# Patient Record
Sex: MALE | Race: WHITE | Employment: OTHER | URBAN - METROPOLITAN AREA
[De-identification: names, ages, dates, MRNs, and addresses within clinical notes are randomized per-mention and may not be internally consistent; named-entity substitution may affect disease eponyms.]

---

## 2017-01-09 ENCOUNTER — TRANSFERRED RECORDS (OUTPATIENT)
Dept: HEALTH INFORMATION MANAGEMENT | Facility: CLINIC | Age: 72
End: 2017-01-09

## 2017-01-09 LAB
ALT SERPL-CCNC: 31 U/L (ref 12–78)
AST SERPL-CCNC: 22 U/L (ref 0–40)
CREAT SERPL-MCNC: 0.96 MG/DL (ref 0.66–1.25)
GFR SERPL CREATININE-BSD FRML MDRD: >60 ML/MIN/1.73M2
GLUCOSE SERPL-MCNC: 115 MG/DL (ref 70–180)
POTASSIUM SERPL-SCNC: 5.2 MMOL/L (ref 3.5–5.3)

## 2017-01-20 ENCOUNTER — OFFICE VISIT (OUTPATIENT)
Dept: FAMILY MEDICINE | Facility: CLINIC | Age: 72
End: 2017-01-20
Payer: COMMERCIAL

## 2017-01-20 ENCOUNTER — TELEPHONE (OUTPATIENT)
Dept: OTHER | Facility: CLINIC | Age: 72
End: 2017-01-20

## 2017-01-20 VITALS
HEART RATE: 64 BPM | DIASTOLIC BLOOD PRESSURE: 62 MMHG | SYSTOLIC BLOOD PRESSURE: 132 MMHG | OXYGEN SATURATION: 93 % | WEIGHT: 134.1 LBS

## 2017-01-20 DIAGNOSIS — I73.9 PVD (PERIPHERAL VASCULAR DISEASE) (H): Primary | ICD-10-CM

## 2017-01-20 DIAGNOSIS — J44.9 CHRONIC OBSTRUCTIVE PULMONARY DISEASE, UNSPECIFIED COPD TYPE (H): ICD-10-CM

## 2017-01-20 DIAGNOSIS — F03.90 DEMENTIA WITHOUT BEHAVIORAL DISTURBANCE, UNSPECIFIED DEMENTIA TYPE: ICD-10-CM

## 2017-01-20 DIAGNOSIS — I70.90 ARTERIOSCLEROTIC VASCULAR DISEASE: ICD-10-CM

## 2017-01-20 DIAGNOSIS — I73.9 CLAUDICATION (H): ICD-10-CM

## 2017-01-20 PROCEDURE — 99204 OFFICE O/P NEW MOD 45 MIN: CPT | Performed by: FAMILY MEDICINE

## 2017-01-20 RX ORDER — ALBUTEROL SULFATE 0.83 MG/ML
1 SOLUTION RESPIRATORY (INHALATION) EVERY 6 HOURS PRN
Qty: 25 VIAL | Refills: 1 | COMMUNITY
Start: 2017-01-20 | End: 2017-09-06 | Stop reason: ALTCHOICE

## 2017-01-20 RX ORDER — ATENOLOL 50 MG/1
50 TABLET ORAL DAILY
COMMUNITY
End: 2017-03-17

## 2017-01-20 RX ORDER — CLOPIDOGREL BISULFATE 75 MG/1
75 TABLET ORAL DAILY
Qty: 90 TABLET | Refills: 3 | Status: SHIPPED | OUTPATIENT
Start: 2017-01-20 | End: 2017-03-08

## 2017-01-20 NOTE — PROGRESS NOTES
"Chief Complaint   Patient presents with     [facility] Care     get aquainted with Dr. Hills      Patient Request     concerns with ? stopping the O2      Results     go over lab resuts that were done at [facility]      ADDITIONAL HPI: 71 year old male here for above issue.  12/22/2016 was hospitalized with pneumonia. Initially was felt to be CV and had angiogram that was \"normal\".  Has a history of COPD.  Breathing has been stable since discharge. Currently still on home O2. Wondering if he can stop this.  He also has a history of dementia.    On 12/5/2016 he underwent balloon plasty and stent of left external iliac artery for PVD with claudications. left superficial femoral artery was occluded.initiallt through mid to distal thigh and this underwent arthrectomy followed by a drug eluting balloon placement. His right lower extremities had diffuse irregularities in the iliac system as well as moderate narrowing in the superficial femoral but no interventions were performed on right leg. He was placed on Plavix but has run out.    Subsequent to the procedure he developed pneumonia and a mild increase in troponin to 0.266. He was then transfered to [facility] for CV evaluation. Angiogram was performed which showed no significant stenosis.    He is no longer smoking.    He also has a history of of mild-moderate dementia. Was started on Namenda but has not seen appreciable change. Has not had neurology assessment/follow-up.     ROS: 10 point review of systems negative except as per HPI.    PAST MEDICAL HISTORY:  No past medical history on file.     ACTIVE MEDICAL PROBLEMS:  Patient Active Problem List   Diagnosis     PVD (peripheral vascular disease) (H)        FAMILY HISTORY:  No family history on file.    SOCIAL HISTORY:  Social History     Social History     Marital Status:      Spouse Name: N/A     Number of Children: N/A     Years of Education: N/A     Occupational History     Not on file. "     Social History Main Topics     Smoking status: Former Smoker     Quit date: 12/23/2016     Smokeless tobacco: Not on file     Alcohol Use: Yes      Comment: occassionally     Drug Use: No     Sexual Activity: Not on file     Other Topics Concern     Not on file     Social History Narrative     No narrative on file       MEDICATIONS:  Current Outpatient Prescriptions   Medication Sig Dispense Refill     SIMVASTATIN PO Take 5 mg by mouth At Bedtime       atenolol (TENORMIN) 50 MG tablet Take 50 mg by mouth daily       MEMANTINE HCL PO Take 10 mg by mouth 2 times daily       CITALOPRAM HYDROBROMIDE PO Take 10 mg by mouth daily       LISINOPRIL PO Take 40 mg by mouth daily       CLOPIDOGREL BISULFATE PO Take 75 mg by mouth daily       aspirin 81 MG tablet Take 81 mg by mouth daily       Thiamine HCl (VITAMIN B-1 PO) Take by mouth daily         ALLERGIES:   No Known Allergies    Problem list, Medication list, Allergies, and Medical/Social/Surgical histories reviewed in [facility] and updated as appropriate.    OBJECTIVE:                                                    VITALS: /62 mmHg  Pulse 64  Wt 134 lb 1.6 oz (60.827 kg)  SpO2 93% There is no height on file to calculate BMI.  GENERAL: Pleasant, well appearing male.  HEENT: PERRL, EOMI, oropharynx clear.  NECK: supple, no thyromegaly or thyroid masses, no lymphadenopathy.  CV: RRR, no murmurs, rubs or gallops.  LUNGS: Clear to auscultation bilaterally, normal effort.  SKIN: warm and dry without obvious rashes.   EXTREMITIES: No edema, normal pulses.     ASSESSMENT/PLAN:                                                    1. PVD (peripheral vascular disease) (H)  He is establishing care here and needs follow-up for his recent lower extremity balloon plasty - See HPI. Refilled Plavix. Continue with this indefinitely or unless otherwise directed by vascular surgery.  - VASCULAR SURGERY REFERRAL  - clopidogrel (PLAVIX) 75 MG tablet; Take 1 tablet (75 mg) by mouth  daily  Dispense: 90 tablet; Refill: 3    2. Chronic obstructive pulmonary disease, unspecified COPD type (H)  Stable. Refilled medication.    - COPD ACTION PLAN - order for Health Maintenance  - albuterol (2.5 MG/3ML) 0.083% neb solution; Take 1 vial (2.5 mg) by nebulization every 6 hours as needed for shortness of breath / dyspnea or wheezing  Dispense: 25 vial; Refill: 1    3. Dementia without behavioral disturbance, unspecified dementia type  Was started on Namenda but has not had neurology follow-up.  - NEUROLOGY ADULT REFERRAL

## 2017-01-20 NOTE — NURSING NOTE
Chief Complaint   Patient presents with     Establish Care     get aquainted with Dr. Hills      Patient Request     concerns with ? stopping the O2      Results     go over lab resuts that were done at [facility]        Initial /62 mmHg  Pulse 64  Wt 134 lb 1.6 oz (60.827 kg) There is no height on file to calculate BMI.  BP completed using cuff size: regular    Kati Hilario, CMA

## 2017-01-20 NOTE — TELEPHONE ENCOUNTER
[facility]  [facility].  Problem list notes stent in left leg.    [facility] stent placed last year.  Access CareEverywhere when pt comes for OV.    Pt denies open wounds and pain.    Pt needs to be scheduled for LOKESH with exercise and Left LE US and consult with Vascular Surgery or IR.  Will route to scheduling to coordinate an appointment next available.    Jayna Stephens RN BSN

## 2017-01-20 NOTE — MR AVS SNAPSHOT
After Visit Summary   1/20/2017    Chadwick Aguilar    MRN: [id number]           Patient Information     Date Of Birth          1945        Visit Information        Provider Department      1/20/2017 2:00 PM Jessi Hills MD [facility]'s Diagnoses     PVD (peripheral vascular disease) (H)    -  1     Chronic obstructive pulmonary disease, unspecified COPD type (H)         Dementia without behavioral disturbance, unspecified dementia type            Follow-ups after your visit        Additional Services     NEUROLOGY ADULT REFERRAL       Your provider has referred you to: [facility]: [facility] [phone]   [email or web address]    Reason for Referral: Consult    Please be aware that coverage of these services is subject to the terms and limitations of your health insurance plan.  Call member services at your health plan with any benefit or coverage questions.      Please bring the following with you to your appointment:    (1) Any X-Rays, CTs or MRIs which have been performed.  Contact the facility where they were done to arrange for  prior to your scheduled appointment.    (2) List of current medications  (3) This referral request   (4) Any documents/labs given to you for this referral            VASCULAR SURGERY REFERRAL       Your provider has referred you to: [facility]: [facility] - Vascular  Services [phone] - Peripheral Artery Disease - has stent placed into left leg   [email or web address]    Please be aware that coverage of these services is subject to the terms and limitations of your health insurance plan.  Call member services at your health plan with any benefit or coverage questions.      Please bring the following with you to your appointment:    (1) Any X-Rays, CTs or MRIs which have been performed.  Contact the facility where they  "were done to arrange for  prior to your scheduled appointment.    (2) List of current medications   (3) This referral request   (4) Any documents/labs given to you for this referral                  Who to contact     If you have questions or need follow up information about today's clinic visit or your schedule please contact [facility] directly at [phone].  Normal or non-critical lab and imaging results will be communicated to you by MyChart, letter or phone within 4 business days after the clinic has received the results. If you do not hear from us within 7 days, please contact the clinic through [employer] or phone. If you have a critical or abnormal lab result, we will notify you by phone as soon as possible.  Submit refill requests through [employer] or call your pharmacy and they will forward the refill request to us. Please allow 3 business days for your refill to be completed.          Additional Information About Your Visit        [employer] Information     [employer] lets you send messages to your doctor, view your test results, renew your prescriptions, schedule appointments and more. To sign up, go to [email or web address] . Click on \"Log in\" on the left side of the screen, which will take you to the Welcome page. Then click on \"Sign up Now\" on the right side of the page.     You will be asked to enter the access code listed below, as well as some personal information. Please follow the directions to create your username and password.     Your access code is: [email or web address]  Expires: 2017  3:06 PM     Your access code will  in 90 days. If you need help or a new code, please call your [place] clinic or [phone].        Care EveryWhere ID     This is your Care EveryWhere ID. This could be used by other organizations to access your [place] medical records  [id number]        Your Vitals Were     Pulse Pulse Oximetry                64 93%           Blood Pressure from " Last 3 Encounters:   01/20/17 132/62    Weight from Last 3 Encounters:   01/20/17 134 lb 1.6 oz (60.827 kg)              We Performed the Following     COPD ACTION PLAN - order for Health Maintenance     NEUROLOGY ADULT REFERRAL     VASCULAR SURGERY REFERRAL          Where to get your medicines      These medications were sent to [facility] [place], [place] - [address] [facility] [address]  [address] [place]. \[facility].\"" [address]     Phone:  [phone]    - clopidogrel 75 MG tablet       Primary Care Provider    None Specified       No primary provider on file.        Thank you!     Thank you for choosing [facility]  for your care. Our goal is always to provide you with excellent care. Hearing back from our patients is one way we can continue to improve our services. Please take a few minutes to complete the written survey that you may receive in the mail after your visit with us. Thank you!             Your Updated Medication List - Protect others around you: Learn how to safely use, store and throw away your medicines at www.disposemymeds.org.          This list is accurate as of: 1/20/17  3:06 PM.  Always use your most recent med list.                   Brand Name Dispense Instructions for use    albuterol (2.5 MG/3ML) 0.083% neb solution     25 vial    Take 1 vial (2.5 mg) by nebulization every 6 hours as needed for shortness of breath / dyspnea or wheezing       aspirin 81 MG tablet      Take 81 mg by mouth daily       atenolol 50 MG tablet    TENORMIN     Take 50 mg by mouth daily       CITALOPRAM HYDROBROMIDE PO      Take 10 mg by mouth daily       clopidogrel 75 MG tablet    PLAVIX    90 tablet    Take 1 tablet (75 mg) by mouth daily       LISINOPRIL PO      Take 40 mg by mouth daily       MEMANTINE HCL PO      Take 10 mg by mouth 2 times daily       SIMVASTATIN PO      Take 5 mg by mouth At Bedtime       VITAMIN B-1 PO      Take by mouth daily

## 2017-01-27 ENCOUNTER — HOSPITAL ENCOUNTER (OUTPATIENT)
Dept: ULTRASOUND IMAGING | Facility: CLINIC | Age: 72
Discharge: HOME | End: 2017-01-27
Attending: FAMILY MEDICINE | Admitting: FAMILY MEDICINE
Payer: COMMERCIAL

## 2017-01-27 ENCOUNTER — HOSPITAL ENCOUNTER (OUTPATIENT)
Dept: ULTRASOUND IMAGING | Facility: CLINIC | Age: 72
End: 2017-01-27
Attending: FAMILY MEDICINE
Payer: COMMERCIAL

## 2017-01-27 DIAGNOSIS — I73.9 PVD (PERIPHERAL VASCULAR DISEASE) (H): ICD-10-CM

## 2017-01-27 PROCEDURE — 93926 LOWER EXTREMITY STUDY: CPT | Mod: LT

## 2017-01-27 PROCEDURE — 93924 LWR XTR VASC STDY BILAT: CPT

## 2017-02-01 PROBLEM — I70.90 ARTERIOSCLEROTIC VASCULAR DISEASE: Status: ACTIVE | Noted: 2017-02-01

## 2017-02-01 PROBLEM — I10 ESSENTIAL HYPERTENSION, BENIGN: Status: ACTIVE | Noted: 2017-02-01

## 2017-02-01 PROBLEM — I73.9 CLAUDICATION (H): Status: ACTIVE | Noted: 2017-02-01

## 2017-02-01 PROBLEM — E78.5 HYPERLIPIDEMIA LDL GOAL <70: Status: ACTIVE | Noted: 2017-02-01

## 2017-02-07 ENCOUNTER — OFFICE VISIT (OUTPATIENT)
Dept: SURGERY | Facility: CLINIC | Age: 72
End: 2017-02-07
Payer: COMMERCIAL

## 2017-02-07 VITALS
HEIGHT: 68 IN | DIASTOLIC BLOOD PRESSURE: 72 MMHG | HEART RATE: 61 BPM | TEMPERATURE: 98.1 F | BODY MASS INDEX: 19.4 KG/M2 | SYSTOLIC BLOOD PRESSURE: 137 MMHG | WEIGHT: 128 LBS

## 2017-02-07 DIAGNOSIS — I73.9 PAD (PERIPHERAL ARTERY DISEASE) (H): Primary | ICD-10-CM

## 2017-02-07 PROCEDURE — 99204 OFFICE O/P NEW MOD 45 MIN: CPT | Performed by: SURGERY

## 2017-02-07 RX ORDER — CLOPIDOGREL BISULFATE 75 MG/1
75 TABLET ORAL DAILY
Qty: 90 TABLET | Refills: 1 | Status: SHIPPED | OUTPATIENT
Start: 2017-02-07 | End: 2018-03-13

## 2017-02-07 NOTE — NURSING NOTE
"Initial /75 mmHg  Pulse 61  Temp(Src) 98.1  F (36.7  C) (Oral)  Ht 1.727 m (5' 8\")  Wt 58.06 kg (128 lb)  BMI 19.47 kg/m2 Estimated body mass index is 19.47 kg/(m^2) as calculated from the following:    Height as of this encounter: 1.727 m (5' 8\").    Weight as of this encounter: 58.06 kg (128 lb). .    Adri Joseph MA    "

## 2017-02-07 NOTE — MR AVS SNAPSHOT
"              After Visit Summary   2/7/2017    Chadwick Aguilar    MRN: [id number]           Patient Information     Date Of Birth          1945        Visit Information        Provider Department      2/7/2017 9:00 AM Johnny Ann MD [facility]        Care Instructions    Per Physician's instructions        Follow-ups after your visit        Your next 10 appointments already scheduled     Mar 08, 2017  8:00 AM   New Visit with Brooke Liang MD   [facility]    [address] [address] [phone]              Who to contact     If you have questions or need follow up information about today's clinic visit or your schedule please contact [facility] directly at [phone].  Normal or non-critical lab and imaging results will be communicated to you by MyChart, letter or phone within 4 business days after the clinic has received the results. If you do not hear from us within 7 days, please contact the clinic through MyChart or phone. If you have a critical or abnormal lab result, we will notify you by phone as soon as possible.  Submit refill requests through [employer] or call your pharmacy and they will forward the refill request to us. Please allow 3 business days for your refill to be completed.          Additional Information About Your Visit        MyChart Information     [employer] lets you send messages to your doctor, view your test results, renew your prescriptions, schedule appointments and more. To sign up, go to [email or web address] . Click on \"Log in\" on the left side of the screen, which will take you to the Welcome page. Then click on \"Sign up Now\" on the right side of the page.     You will be asked to enter the access code listed below, as well as some personal information. Please follow the directions to create your username and password.     Your access code is: [email or web address]  Expires: " "2017  3:06 PM     Your access code will  in 90 days. If you need help or a new code, please call your [facility] or [phone].        Care EveryWhere ID     This is your Care EveryWhere ID. This could be used by other organizations to access your [place] medical records  [id number]        Your Vitals Were     Pulse Temperature Height BMI (Body Mass Index)          61 98.1  F (36.7  C) (Oral) 1.727 m (5' 8\") 19.47 kg/m2         Blood Pressure from Last 3 Encounters:   17 145/75   17 132/62    Weight from Last 3 Encounters:   17 58.06 kg (128 lb)   17 60.827 kg (134 lb 1.6 oz)              Today, you had the following     No orders found for display       Primary Care Provider Office Phone # Fax #    Miladyschristinisidro Danni Hills [phone]       [facility] [id number] [place] [address]        Thank you!     Thank you for choosing [facility]  for your care. Our goal is always to provide you with excellent care. Hearing back from our patients is one way we can continue to improve our services. Please take a few minutes to complete the written survey that you may receive in the mail after your visit with us. Thank you!             Your Updated Medication List - Protect others around you: Learn how to safely use, store and throw away your medicines at www.disposemymeds.org.          This list is accurate as of: 17  9:01 AM.  Always use your most recent med list.                   Brand Name Dispense Instructions for use    albuterol (2.5 MG/3ML) 0.083% neb solution     25 vial    Take 1 vial (2.5 mg) by nebulization every 6 hours as needed for shortness of breath / dyspnea or wheezing       aspirin 81 MG tablet      Take 81 mg by mouth daily       atenolol 50 MG tablet    TENORMIN     Take 50 mg by mouth daily       CITALOPRAM HYDROBROMIDE PO      Take 10 mg by mouth daily       clopidogrel 75 MG tablet    PLAVIX "    90 tablet    Take 1 tablet (75 mg) by mouth daily       LISINOPRIL PO      Take 40 mg by mouth daily       MEMANTINE HCL PO      Take 10 mg by mouth 2 times daily       SIMVASTATIN PO      Take 5 mg by mouth At Bedtime       VITAMIN B-1 PO      Take by mouth daily

## 2017-02-08 DIAGNOSIS — I70.219 EXTREMITY ATHEROSCLEROSIS WITH INTERMITTENT CLAUDICATION (H): Primary | ICD-10-CM

## 2017-02-08 NOTE — PROGRESS NOTES
2017      CLINIC NOTE      Re:  Chadwick Aguilar,  1945      PRESENTING COMPLAINT:  Left lower extremity peripheral arterial disease.      HISTORY OF PRESENTING ILLNESS:  Mr. Aguilar is a 71-year-old gentleman whom we have been asked to see for further evaluation with regard to his peripheral artery disease.  He underwent a left iliac stent in 2015 in [place].  At that time he was having numbness and claudication of the left lower extremity.  Since the placement of that stent he has not had any further problems.  He can walk any distance he chooses.  He recently relocated and is establishing vascular care.      PAST MEDICAL HISTORY:   1.  Chronic obstructive pulmonary disease.   2.  Peripheral arterial disease.   3.  Hypertension.   4.  Hyperlipidemia.      PAST SURGICAL HISTORY:  Left thumb surgery.      SOCIAL HISTORY:  He used to work for [employer].  He quit smoking  of last year, along with his wife.      REVIEW OF SYSTEMS:  As noted in History of Presenting Illness, otherwise negative.      FAMILY HISTORY:  He tells me his brother had peripheral arterial disease.      PHYSICAL EXAMINATION:   GENERAL:  He appears comfortable and is in no acute distress.   VITAL SIGNS:  Reviewed.   HEENT:  Head is atraumatic, normocephalic, mucosa are pink.   EYES:  Extraocular motions are intact.  Sclerae are anicteric.   MENTAL:  Alert and oriented.  Judgment and insight are good.   LYMPHATIC:  No supraclavicular or cervical adenopathy noted.   CARDIOVASCULAR:  Regular rate and rhythm.  S1 plus S2 plus 0.   RESPIRATORY:  Good air entry bilaterally.  Good respiratory effort, no accessory muscles are being used.   ABDOMEN:  Soft, nontender, no hepatosplenomegaly noted.   EXTREMITIES:  No clubbing, cyanosis or edema.         Re:  Chadwick BASSCindy Aguilar, page 2      VASCULAR:  No carotid bruits.  3+ bilateral radial pulses.  Right femoral pulses are 3+.  Left femoral pulses also 3+.  There is a  left femoral bruit.  Left dorsalis pedis and posterior tibial are biphasic by Doppler and 2+ palpable on the right side.      IMAGING DATA:  Mr. Matthew underwent noninvasive imaging.  The duplex sonography showed that he has a left iliac stent with mid stent velocity of 215 cm per second and outflow velocity of 234 cm per second.  There is a large amount of atherosclerotic plaque in the common femoral artery.  He also underwent ankle-brachial indices.  On the right, resting ankle-brachial index is 0.96.  On the left it is 0.95.  After exercise the right ankle-brachial index is 0.97 and left is 0.85.      DIAGNOSIS:  Peripheral arterial disease.      PLAN:  He has a stent which is widely patent.  He does have atherosclerotic disease of the common femoral artery, but he is asymptomatic from that so I would not advise any further intervention.  He does have somewhat elevated velocities within the stent.  We will reevaluate him with ankle-brachial indices with and without exercise and then the duplex sonography of the iliac stent in 6 months' time.  I would like for him to continue the Plavix at least up until that time, and refills will be given to him.              MD KAREN Castaneda/elvia      Dictation #[id number]  D: 2017  T: 2017         JHONNY HENAO MD             D: 2017 09:29   T: 2017 07:35   MT: elvia      Name:     DALLAS MATTHEW   MRN:      [id number]        Account:      [id number]   :      1945           Service Date: 2017      Document: [id number]

## 2017-03-08 ENCOUNTER — OFFICE VISIT (OUTPATIENT)
Dept: NEUROLOGY | Facility: CLINIC | Age: 72
End: 2017-03-08
Payer: COMMERCIAL

## 2017-03-08 VITALS
SYSTOLIC BLOOD PRESSURE: 140 MMHG | BODY MASS INDEX: 20.31 KG/M2 | TEMPERATURE: 98.2 F | WEIGHT: 133.6 LBS | DIASTOLIC BLOOD PRESSURE: 87 MMHG | HEART RATE: 60 BPM

## 2017-03-08 DIAGNOSIS — R41.89 COGNITIVE IMPAIRMENT: Primary | ICD-10-CM

## 2017-03-08 PROCEDURE — 99204 OFFICE O/P NEW MOD 45 MIN: CPT | Performed by: PSYCHIATRY & NEUROLOGY

## 2017-03-08 NOTE — PROGRESS NOTES
"INITIAL NEUROLOGY CONSULTATION    DATE OF VISIT: 3/8/2017  MRN: [id number]  PATIENT NAME: Chadwick Aguilar  YOB: 1945    REFERRING PROVIDER: No ref. provider found    Chief Complaint   Patient presents with     Consult     Dementia.  Referral Jessi Hlils MD       SUBJECTIVE:                                                      HPI:   Chadwick Aguilar is a 71 year old male who presents for evaluation of memory concerns. Per chart review, he has a history of mild-moderate dementia. The patient has a history of vascular disease. The patient himself does not really have any memory complaints. He perseverates on the diagnosis and their family's reaction to the diagnosis (he seems to feel that everyone overreacted).     The patient is accompanied by his wife who says that he was evaluated in July 2016 at [place], at the request of his primary care provider at the time. He apparently did some brief testing (?SLUMS and maybe CPT) and was given the diagnosis by a \"specialist\" there. I do not have these records. He was mainly having some short term memory problems and forgetting conversations/events. He took a downturnn it sounds like rather dramatically when he was ill with pneumonia in December. The evaluations at that time required two procedures with anesthesia. This seemed to make the memory much worse for the short term. It has since improved, per patient's wife. They have recently relocated to [place] to be closer to family. The patient has been on Namenda since July and has no complaints about side effects. It is not clear if this has been helpful in terms of his cognition.     They have not really noticed any distinct behavioral or mood changes. The patient's wife says that he seems to be adapting well now. No changes in gait or voice. He denies stiffness. No safety concerns at home. He apparently did have a driving test which he passed. His wife has always done the finances, he has no " "trouble with ADLs. They are together \"24 hours a day\" and take care of each other and the patient's wife feels that they are doing well.     No past medical history on file.  No past surgical history on file.      Current Outpatient Prescriptions on File Prior to Visit:  clopidogrel (PLAVIX) 75 MG tablet Take 1 tablet (75 mg) by mouth daily   SIMVASTATIN PO Take 5 mg by mouth At Bedtime   atenolol (TENORMIN) 50 MG tablet Take 50 mg by mouth daily   MEMANTINE HCL PO Take 10 mg by mouth 2 times daily   CITALOPRAM HYDROBROMIDE PO Take 10 mg by mouth daily   LISINOPRIL PO Take 40 mg by mouth daily   aspirin 81 MG tablet Take 81 mg by mouth daily   Thiamine HCl (VITAMIN B-1 PO) Take 100 mg by mouth daily    albuterol (2.5 MG/3ML) 0.083% neb solution Take 1 vial by nebulization every 6 hours as needed for shortness of breath / dyspnea or wheezing Reported on 3/8/2017   [DISCONTINUED] clopidogrel (PLAVIX) 75 MG tablet Take 1 tablet (75 mg) by mouth daily     No current facility-administered medications on file prior to visit.   No Known Allergies      Social History   Substance Use Topics     Smoking status: Light Tobacco Smoker     Packs/day: 0.25     Years: 50.00     Types: Cigarettes     Last attempt to quit: 12/23/2016     Smokeless tobacco: Never Used      Comment: uses patches PRN     Alcohol use Yes      Comment: occassionally       REVIEW OF SYSTEMS:                                                      10-point review of systems is negative except as mentioned above in HPI.     EXAM:                                                      Physical Exam:   Vitals: /87 (BP Location: Right arm, Patient Position: Chair, Cuff Size: Adult Regular)  Pulse 60  Temp 98.2  F (36.8  C) (Oral)  Wt 133 lb 9.6 oz (60.6 kg)  BMI 20.31 kg/m2  BMI= Body mass index is 20.31 kg/(m^2).  GENERAL: NAD. Neatly groomed; Dressed appropriately.  Neurologic:  MENTAL STATUS: Alert, attentive. Speech is fluent, with normal naming repetition " comprehension. MoCA: 19/30 (normal is 26 and above).   CRANIAL NERVES: Visual fields intact to confrontation. Pupils equally, round and reactive to light. Facial sensation and movement normal. EOM full. Hearing intact to conversation, though slightly [place]. Trapezius strength intact. Palate moves symmetrically. Tongue midline.  MOTOR: 5/5 in proximal and distal muscle groups of upper and lower extremities. Tone and bulk normal (Patient has difficulty relaxing). Fine bilateral hand tremor with posture and action.   DTRs: 1+, perhaps slightly more brisk in Left patella (Patient has difficulty relaxing). Babinski down-going bilaterally.   SENSATION: Normal light touch and pinprick. Intact proprioception. Vibration: Slightly decreased at both ankles.   COORDINATION: Terminal intention tremorormal finger nose finger. Finger tapping normal. Normal rapid alternating movements. Knee heel shin normal.  STATION AND GAIT: Romberg negative. Good postural reflexes. Tandem unsteady.   CV: RRR. S1, S2.   NECK: No bruits.    ASSESSMENT and PLAN:                                                      Assessment and Plan:     ICD-10-CM    1. Cognitive impairment R41.89         Mr. Aguilar is a pleasant 72 yo man here to establish care for dementia, previously diagnosed by an outside provider. I explained to the patient and his wife that I will have to review the records of his cognitive evaluation to determine if any further testing needs to be done to help clarify/confirm the diagnosis. He seems to be functioning without difficulty at home. There are no current safety concerns. He is tolerating the Namenda well, so I recommended he continue this. I explained to the patient and his wife that it is not uncommon for memory problems to transiently worsen with acute illness/anesthesia, as it seems he experienced late last year. Thankfully, at least from their perspective, he is back to his baseline. He does appear to have essential tremor,  but otherwise no distinct signs of a movement disorder. We discussed the importance of staying active. I asked the patient's wife to let me know if they need any additional support at home. We will plan for follow-up in 6 months. The patient understands and agrees with the plan.     Patient Instructions:  Continue the memantine 10mg twice daily (for memory)  Remember to stay mentally, physically and socially active.   We will plan to meet again in about 6 months to see how you are doing.   I will review your previous records in the meantime.   Please let us know if any concerns arise at home before your next appointment.    Total Time: 45 minutes were spent with the patient. More than 50% of the time spent on counseling (as described above in Assessment and Plan) /coordinating the care.    Brooke Liang MD  Neurology    CC: Jessi Hills MD

## 2017-03-08 NOTE — PATIENT INSTRUCTIONS
Plan:    Continue the memantine 10mg twice daily (for memory)  Remember to stay mentally, physically and socially active.   We will plan to meet again in about 6 months to see how you are doing.   I will review your previous records in the meantime.   Please let us know if any concerns arise at home before your next appointment.

## 2017-03-08 NOTE — MR AVS SNAPSHOT
"              After Visit Summary   3/8/2017    Chadwick Aguilar    MRN: [id number]           Patient Information     Date Of Birth          1945        Visit Information        Provider Department      3/8/2017 8:00 AM Brooke Liang MD [facility]        Care Instructions    Plan:    Continue the memantine 10mg twice daily (for memory)  Remember to stay mentally, physically and socially active.   We will plan to meet again in about 6 months to see how you are doing.   I will review your previous records in the meantime.   Please let us know if any concerns arise at home before your next appointment.         Follow-ups after your visit        Your next 10 appointments already scheduled     Aug 15, 2017  9:00 AM CDT   Return Visit with Johnny Ann MD   [facility]    [address] [address] [phone]              Who to contact     If you have questions or need follow up information about today's clinic visit or your schedule please contact [facility] directly at [phone].  Normal or non-critical lab and imaging results will be communicated to you by MyChart, letter or phone within 4 business days after the clinic has received the results. If you do not hear from us within 7 days, please contact the clinic through [employer] or phone. If you have a critical or abnormal lab result, we will notify you by phone as soon as possible.  Submit refill requests through [email or web address] or call your pharmacy and they will forward the refill request to us. Please allow 3 business days for your refill to be completed.          Additional Information About Your Visit        MyChart Information     [email or web address] lets you send messages to your doctor, view your test results, renew your prescriptions, schedule appointments and more. To sign up, go to [email or web address] . Click on \"Log in\" on the left side of the screen, which " "will take you to the Welcome page. Then click on \"Sign up Now\" on the right side of the page.     You will be asked to enter the access code listed below, as well as some personal information. Please follow the directions to create your username and password.     Your access code is: [email or web address]  Expires: 2017  3:06 PM     Your access code will  in 90 days. If you need help or a new code, please call your [facility] or [phone].        Care EveryWhere ID     This is your Care EveryWhere ID. This could be used by other organizations to access your [place] medical records  [id number]        Your Vitals Were     Pulse Temperature BMI (Body Mass Index)             60 98.2  F (36.8  C) (Oral) 20.31 kg/m2          Blood Pressure from Last 3 Encounters:   17 140/87   17 145/75   17 132/62    Weight from Last 3 Encounters:   17 133 lb 9.6 oz (60.6 kg)   17 128 lb (58.1 kg)   17 134 lb 1.6 oz (60.8 kg)              Today, you had the following     No orders found for display         Today's Medication Changes          These changes are accurate as of: 3/8/17  9:00 AM.  If you have any questions, ask your nurse or doctor.               These medicines have changed or have updated prescriptions.        Dose/Directions    clopidogrel 75 MG tablet   Commonly known as:  PLAVIX   This may have changed:  Another medication with the same name was removed. Continue taking this medication, and follow the directions you see here.   Changed by:  Johnny Ann MD        Dose:  75 mg   Take 1 tablet (75 mg) by mouth daily   Quantity:  90 tablet   Refills:  1                Primary Care Provider Office Phone # Fax #    Elissalatia Danni Hills [phone]       [facility] [address] [place] [address]        Thank you!     Thank you for choosing [facility]  for your care. Our goal is always to provide you with " excellent care. Hearing back from our patients is one way we can continue to improve our services. Please take a few minutes to complete the written survey that you may receive in the mail after your visit with us. Thank you!             Your Updated Medication List - Protect others around you: Learn how to safely use, store and throw away your medicines at www.disposemymeds.org.          This list is accurate as of: 3/8/17  9:00 AM.  Always use your most recent med list.                   Brand Name Dispense Instructions for use    albuterol (2.5 MG/3ML) 0.083% neb solution     25 vial    Take 1 vial by nebulization every 6 hours as needed for shortness of breath / dyspnea or wheezing Reported on 3/8/2017       aspirin 81 MG tablet      Take 81 mg by mouth daily       atenolol 50 MG tablet    TENORMIN     Take 50 mg by mouth daily       CITALOPRAM HYDROBROMIDE PO      Take 10 mg by mouth daily       clopidogrel 75 MG tablet    PLAVIX    90 tablet    Take 1 tablet (75 mg) by mouth daily       LISINOPRIL PO      Take 40 mg by mouth daily       MEMANTINE HCL PO      Take 10 mg by mouth 2 times daily       SIMVASTATIN PO      Take 5 mg by mouth At Bedtime       VITAMIN B-1 PO      Take 100 mg by mouth daily

## 2017-03-17 ENCOUNTER — OFFICE VISIT (OUTPATIENT)
Dept: FAMILY MEDICINE | Facility: CLINIC | Age: 72
End: 2017-03-17
Payer: COMMERCIAL

## 2017-03-17 ENCOUNTER — TRANSFERRED RECORDS (OUTPATIENT)
Dept: HEALTH INFORMATION MANAGEMENT | Facility: CLINIC | Age: 72
End: 2017-03-17

## 2017-03-17 VITALS
HEART RATE: 57 BPM | HEIGHT: 68 IN | BODY MASS INDEX: 20.25 KG/M2 | DIASTOLIC BLOOD PRESSURE: 69 MMHG | WEIGHT: 133.6 LBS | SYSTOLIC BLOOD PRESSURE: 118 MMHG | TEMPERATURE: 98.1 F | OXYGEN SATURATION: 91 %

## 2017-03-17 DIAGNOSIS — I70.90 ARTERIOSCLEROTIC VASCULAR DISEASE: ICD-10-CM

## 2017-03-17 DIAGNOSIS — J44.9 CHRONIC OBSTRUCTIVE PULMONARY DISEASE, UNSPECIFIED COPD TYPE (H): ICD-10-CM

## 2017-03-17 DIAGNOSIS — I73.9 PVD (PERIPHERAL VASCULAR DISEASE) (H): Primary | ICD-10-CM

## 2017-03-17 DIAGNOSIS — E78.5 HYPERLIPIDEMIA LDL GOAL <70: ICD-10-CM

## 2017-03-17 DIAGNOSIS — F03.90 DEMENTIA WITHOUT BEHAVIORAL DISTURBANCE, UNSPECIFIED DEMENTIA TYPE: ICD-10-CM

## 2017-03-17 DIAGNOSIS — Z71.89 ADVANCED DIRECTIVES, COUNSELING/DISCUSSION: ICD-10-CM

## 2017-03-17 DIAGNOSIS — I10 ESSENTIAL HYPERTENSION, BENIGN: ICD-10-CM

## 2017-03-17 DIAGNOSIS — I73.9 CLAUDICATION (H): ICD-10-CM

## 2017-03-17 LAB
ANION GAP SERPL CALCULATED.3IONS-SCNC: 8 MMOL/L (ref 3–14)
BUN SERPL-MCNC: 19 MG/DL (ref 7–30)
CALCIUM SERPL-MCNC: 9.1 MG/DL (ref 8.5–10.1)
CHLORIDE SERPL-SCNC: 95 MMOL/L (ref 94–109)
CHOLEST SERPL-MCNC: 133 MG/DL
CO2 SERPL-SCNC: 29 MMOL/L (ref 20–32)
CREAT SERPL-MCNC: 0.92 MG/DL (ref 0.66–1.25)
FEF 25/75: 0.18
FEV-1: 0.6
FEV1/FVC: NORMAL
FVC: 2.11
GFR SERPL CREATININE-BSD FRML MDRD: 80 ML/MIN/1.7M2
GLUCOSE SERPL-MCNC: 96 MG/DL (ref 70–99)
HDLC SERPL-MCNC: 55 MG/DL
LDLC SERPL CALC-MCNC: 66 MG/DL
NONHDLC SERPL-MCNC: 78 MG/DL
POTASSIUM SERPL-SCNC: 4.6 MMOL/L (ref 3.4–5.3)
SODIUM SERPL-SCNC: 132 MMOL/L (ref 133–144)
TRIGL SERPL-MCNC: 62 MG/DL

## 2017-03-17 PROCEDURE — 36415 COLL VENOUS BLD VENIPUNCTURE: CPT | Performed by: FAMILY MEDICINE

## 2017-03-17 PROCEDURE — 80048 BASIC METABOLIC PNL TOTAL CA: CPT | Performed by: FAMILY MEDICINE

## 2017-03-17 PROCEDURE — 99214 OFFICE O/P EST MOD 30 MIN: CPT | Performed by: FAMILY MEDICINE

## 2017-03-17 PROCEDURE — 80061 LIPID PANEL: CPT | Performed by: FAMILY MEDICINE

## 2017-03-17 RX ORDER — SIMVASTATIN 5 MG
5 TABLET ORAL AT BEDTIME
Qty: 90 TABLET | Refills: 3 | Status: SHIPPED | OUTPATIENT
Start: 2017-03-17 | End: 2018-03-13

## 2017-03-17 RX ORDER — ATENOLOL 50 MG/1
50 TABLET ORAL DAILY
Qty: 90 TABLET | Refills: 3 | Status: SHIPPED | OUTPATIENT
Start: 2017-03-17 | End: 2017-08-09

## 2017-03-17 RX ORDER — LISINOPRIL 40 MG/1
40 TABLET ORAL DAILY
Qty: 90 TABLET | Refills: 3 | Status: SHIPPED | OUTPATIENT
Start: 2017-03-17 | End: 2017-08-09

## 2017-03-17 RX ORDER — MEMANTINE HYDROCHLORIDE 10 MG/1
10 TABLET ORAL 2 TIMES DAILY
Qty: 180 TABLET | Refills: 3 | Status: SHIPPED | OUTPATIENT
Start: 2017-03-17 | End: 2018-04-10

## 2017-03-17 RX ORDER — CITALOPRAM HYDROBROMIDE 20 MG/1
20 TABLET ORAL DAILY
Qty: 90 TABLET | Refills: 3 | Status: SHIPPED | OUTPATIENT
Start: 2017-03-17 | End: 2018-03-13

## 2017-03-17 NOTE — PATIENT INSTRUCTIONS
Thank you for choosing [facility].  You may be receiving a survey in the mail from [facility] regarding your visit today.  Please take a few minutes to complete and return the survey to let us know how we are doing.      Our Clinic hours are:  Mondays    7:20 am - 7 pm  Tues -  Fri  7:20 am - 5 pm    Clinic Phone: [phone]    The clinic lab opens at 7:30 am Mon - Fri and appointments are required.    [facility]. [phone]  Monday-Thursday 8 am - 7pm  Tues/Wed/Fri 8 am - 5:30 pm

## 2017-03-17 NOTE — MR AVS SNAPSHOT
After Visit Summary   3/17/2017    Chadwick Aguilar    MRN: [id number]           Patient Information     Date Of Birth          1945        Visit Information        Provider Department      3/17/2017 10:20 AM Jessi Hills MD [facility]        Today's Diagnoses     Arteriosclerotic vascular disease    -  1    Advanced directives, counseling/discussion        Dementia without behavioral disturbance, unspecified dementia type        PVD (peripheral vascular disease) (H)        Claudication (H)        Chronic obstructive pulmonary disease, unspecified COPD type (H)        Hyperlipidemia LDL goal <70        Essential hypertension, benign          Care Instructions          Thank you for choosing [facility].  You may be receiving a survey in the mail from [facility] regarding your visit today.  Please take a few minutes to complete and return the survey to let us know how we are doing.      Our Clinic hours are:  Mondays    7:20 am - 7 pm  Tues -  Fri  7:20 am - 5 pm    Clinic Phone: [phone]    The clinic lab opens at 7:30 am Mon - Fri and appointments are required.    [facility]. [phone]  Monday-Thursday 8 am - 7pm  Tues/Wed/Fri 8 am - 5:30 pm               Follow-ups after your visit        Your next 10 appointments already scheduled     Aug 15, 2017  9:00 AM CDT   [facility]   [facility]    [address] [address]   [phone]            Sep 07, 2017  8:45 AM CDT   Return Visit with Brooke Liang MD   [facility]    [address] [address]   [phone]              Who to contact     If you have questions or need follow up information about today's clinic visit or your schedule please contact [facility] directly at [phone].  Normal or  "non-critical lab and imaging results will be communicated to you by MyChart, letter or phone within 4 business days after the clinic has received the results. If you do not hear from us within 7 days, please contact the clinic through [employer] or phone. If you have a critical or abnormal lab result, we will notify you by phone as soon as possible.  Submit refill requests through [employer] or call your pharmacy and they will forward the refill request to us. Please allow 3 business days for your refill to be completed.          Additional Information About Your Visit        [email or web address] Information     [email or web address] lets you send messages to your doctor, view your test results, renew your prescriptions, schedule appointments and more. To sign up, go to [email or web address] . Click on \"Log in\" on the left side of the screen, which will take you to the Welcome page. Then click on \"Sign up Now\" on the right side of the page.     You will be asked to enter the access code listed below, as well as some personal information. Please follow the directions to create your username and password.     Your access code is: [email or web address]  Expires: 2017  4:06 PM     Your access code will  in 90 days. If you need help or a new code, please call your [place] clinic or [phone].        Care EveryWhere ID     This is your Care EveryWhere ID. This could be used by other organizations to access your [place] medical records  [id number]        Your Vitals Were     Pulse Temperature Height Pulse Oximetry BMI (Body Mass Index)       57 98.1  F (36.7  C) (Tympanic) 5' 8\" (1.727 m) 91% 20.31 kg/m2        Blood Pressure from Last 3 Encounters:   17 118/69   17 140/87   17 145/75    Weight from Last 3 Encounters:   17 133 lb 9.6 oz (60.6 kg)   17 133 lb 9.6 oz (60.6 kg)   17 128 lb (58.1 kg)              We Performed the Following     Basic metabolic panel     Lipid Profile (Chol, Trig, HDL, LDL calc)  "    Spirometry, Breathing Capacity: Normal Order, Clinic Performed          Today's Medication Changes          These changes are accurate as of: 3/17/17 12:27 PM.  If you have any questions, ask your nurse or doctor.               These medicines have changed or have updated prescriptions.        Dose/Directions    citalopram 20 MG tablet   Commonly known as:  celeXA   This may have changed:    - medication strength  - how much to take   Used for:  Dementia without behavioral disturbance, unspecified dementia type   Changed by:  Jessi Hills MD        Dose:  20 mg   Take 1 tablet (20 mg) by mouth daily   Quantity:  90 tablet   Refills:  3       lisinopril 40 MG tablet   Commonly known as:  PRINIVIL/ZESTRIL   This may have changed:  medication strength   Used for:  Essential hypertension, benign   Changed by:  Jessi Hills MD        Dose:  40 mg   Take 1 tablet (40 mg) by mouth daily   Quantity:  90 tablet   Refills:  3       memantine 10 MG tablet   Commonly known as:  NAMENDA   This may have changed:  medication strength   Used for:  Dementia without behavioral disturbance, unspecified dementia type   Changed by:  Jessi Hills MD        Dose:  10 mg   Take 1 tablet (10 mg) by mouth 2 times daily   Quantity:  180 tablet   Refills:  3            Where to get your medicines      These medications were sent to [facility] [address]  [address] [facility] [address] [address] [address]     Phone:  [phone]     atenolol 50 MG tablet    citalopram 20 MG tablet    lisinopril 40 MG tablet    memantine 10 MG tablet    simvastatin 5 MG tablet                Primary Care Provider Office Phone # Fax #    Jessi Hills [phone]       [facility] [address] [address]        Thank you!     Thank you for choosing [facility]  for your care. Our goal is  always to provide you with excellent care. Hearing back from our patients is one way we can continue to improve our services. Please take a few minutes to complete the written survey that you may receive in the mail after your visit with us. Thank you!             Your Updated Medication List - Protect others around you: Learn how to safely use, store and throw away your medicines at www.disposemymeds.org.          This list is accurate as of: 3/17/17 12:27 PM.  Always use your most recent med list.                   Brand Name Dispense Instructions for use    albuterol (2.5 MG/3ML) 0.083% neb solution     25 vial    Take 1 vial by nebulization every 6 hours as needed for shortness of breath / dyspnea or wheezing Reported on 3/17/2017       aspirin 81 MG tablet      Take 81 mg by mouth daily       atenolol 50 MG tablet    TENORMIN    90 tablet    Take 1 tablet (50 mg) by mouth daily       citalopram 20 MG tablet    celeXA    90 tablet    Take 1 tablet (20 mg) by mouth daily       clopidogrel 75 MG tablet    PLAVIX    90 tablet    Take 1 tablet (75 mg) by mouth daily       lisinopril 40 MG tablet    PRINIVIL/ZESTRIL    90 tablet    Take 1 tablet (40 mg) by mouth daily       memantine 10 MG tablet    NAMENDA    180 tablet    Take 1 tablet (10 mg) by mouth 2 times daily       simvastatin 5 MG tablet    ZOCOR    90 tablet    Take 1 tablet (5 mg) by mouth At Bedtime       VITAMIN B-1 PO      Take 100 mg by mouth daily

## 2017-03-17 NOTE — PROGRESS NOTES
"  SUBJECTIVE:                                                    Chadwick Aguilar is a 72 year old male who presents to clinic today for the following health issues:    Problem:   Chief Complaint   Patient presents with     Lipids     med recheck on all medication and refill       Cough     follow up on pneumonia from Dec 2016.   wondering if still needs to keep albuterol nebs on hand.     ADDITIONAL HPI: 72 year old male here for above issue.  To his recollection he has a history of COPD. Has never had spirometry done. Breathing has improved since his pneumonia in December. Still has occasional breathlessness.  He also has a history of peripheral vascular disease, claudications and is status post lower extremity bypass surgery. He is doing well postoperatively. Followed by vascular surgery.  He also has a history of coronary artery disease and is status post angioplasty. Interestingly, he is only on 5 mg of simvastatin. He reports that his previous physician reduced his simvastatin dose because of \"low cholesterol\" we'll get repeat lipid levels today. I would be inclined to try to increase simvastatin as tolerated to a goal of 40 mg daily.  He also has a history of dementia. Is on Namenda. Is followed by neurology. This has been stable.  He also has a history of hypertension. Needs med refills and labs. Blood pressure stable.    ROS: 10 point review of systems negative except as per HPI.    PAST MEDICAL HISTORY:  History reviewed. No pertinent past medical history.     ACTIVE MEDICAL PROBLEMS:  Patient Active Problem List   Diagnosis     PVD (peripheral vascular disease) (H)     Chronic obstructive pulmonary disease, unspecified COPD type (H)     Dementia without behavioral disturbance, unspecified dementia type     Claudication (H)     Arteriosclerotic vascular disease     Essential hypertension, benign     Hyperlipidemia LDL goal <70     Advanced directives, counseling/discussion        FAMILY HISTORY:  History " reviewed. No pertinent family history.    SOCIAL HISTORY:  Social History     Social History     Marital status:      Spouse name: N/A     Number of children: N/A     Years of education: N/A     Occupational History     Not on file.     Social History Main Topics     Smoking status: Light Tobacco Smoker     Packs/day: 0.25     Years: 50.00     Types: Cigarettes     Last attempt to quit: 12/23/2016     Smokeless tobacco: Never Used      Comment: uses patches PRN     Alcohol use Yes      Comment: occassionally     Drug use: No     Sexual activity: Not on file     Other Topics Concern     Not on file     Social History Narrative       MEDICATIONS:  Current Outpatient Prescriptions   Medication Sig Dispense Refill     simvastatin (ZOCOR) 5 MG tablet Take 1 tablet (5 mg) by mouth At Bedtime 90 tablet 3     atenolol (TENORMIN) 50 MG tablet Take 1 tablet (50 mg) by mouth daily 90 tablet 3     lisinopril (PRINIVIL/ZESTRIL) 40 MG tablet Take 1 tablet (40 mg) by mouth daily 90 tablet 3     citalopram (CELEXA) 20 MG tablet Take 1 tablet (20 mg) by mouth daily 90 tablet 3     memantine (NAMENDA) 10 MG tablet Take 1 tablet (10 mg) by mouth 2 times daily 180 tablet 3     clopidogrel (PLAVIX) 75 MG tablet Take 1 tablet (75 mg) by mouth daily 90 tablet 1     aspirin 81 MG tablet Take 81 mg by mouth daily       Thiamine HCl (VITAMIN B-1 PO) Take 100 mg by mouth daily        albuterol (2.5 MG/3ML) 0.083% neb solution Take 1 vial by nebulization every 6 hours as needed for shortness of breath / dyspnea or wheezing Reported on 3/17/2017 25 vial 1     [DISCONTINUED] SIMVASTATIN PO Take 5 mg by mouth At Bedtime       [DISCONTINUED] atenolol (TENORMIN) 50 MG tablet Take 50 mg by mouth daily       [DISCONTINUED] CITALOPRAM HYDROBROMIDE PO Take 10 mg by mouth daily       [DISCONTINUED] LISINOPRIL PO Take 40 mg by mouth daily         ALLERGIES:   No Known Allergies    Problem list, Medication list, Allergies, and  "Medical/Social/Surgical histories reviewed in [facility] and updated as appropriate.    OBJECTIVE:                                                    VITALS: /69  Pulse 57  Temp 98.1  F (36.7  C) (Tympanic)  Ht 5' 8\" (1.727 m)  Wt 133 lb 9.6 oz (60.6 kg)  SpO2 91%  BMI 20.31 kg/m2 Body mass index is 20.31 kg/(m^2).  GENERAL: Pleasant, well appearing male.  HEENT: PERRL, EOMI, oropharynx clear.  NECK: supple, no thyromegaly or thyroid masses, no lymphadenopathy.  CV: RRR, no murmurs, rubs or gallops.  LUNGS: Clear to auscultation bilaterally, mildly decreased air movement throughout, normal effort.  ABDOMEN: Soft, non-distended, non-tender.  No hepatosplenomegaly or palpable masses.    SKIN: warm and dry without obvious rashes.   EXTREMITIES: No edema, normal pulses.     FEV-1 03/17/2017 12:00 AM Unknown   0.60   FVC 03/17/2017 12:00 AM Unknown   2.11   FEV1/FVC 03/17/2017 12:00 AM Unknown   28%   FEF 25/75 03/17/2017 12:00 AM Unknown   0.18         ASSESSMENT/PLAN:                                                    1. PVD (peripheral vascular disease) (H)  Stable. Following with vascular surgery. Continuing with Plavix for now. Has follow-up with them in 6 months.    2. Claudication (H)  Stable. Following with vascular surgery. Continuing with Plavix for now. Has follow-up with them in 6 months.    3. Chronic obstructive pulmonary disease, unspecified COPD type (H)  Completed spirometry today. This shows severe COPD. We'll start inhaled steroids. Clinically asymptomatic. Likely will need oxygen at some point in the future.  - Spirometry, Breathing Capacity: Normal Order, Clinic Performed    4. Advanced directives, counseling/discussion    5. Dementia without behavioral disturbance, unspecified dementia type  Stable. Refilled medication.  Following with neurology.  - citalopram (CELEXA) 20 MG tablet; Take 1 tablet (20 mg) by mouth daily  Dispense: 90 tablet; Refill: 3  - memantine (NAMENDA) 10 MG tablet; Take 1 " "tablet (10 mg) by mouth 2 times daily  Dispense: 180 tablet; Refill: 3    6. Arteriosclerotic vascular disease  Stable. Will recheck lipid panel. Given his vascular disease I like to try to increase the simvastatin dose. He reports that his previous physician reduced it due to \"low cholesterol\"  - simvastatin (ZOCOR) 5 MG tablet; Take 1 tablet (5 mg) by mouth At Bedtime  Dispense: 90 tablet; Refill: 3  - Lipid Profile (Chol, Trig, HDL, LDL calc)    7. Hyperlipidemia LDL goal <70  See #6 above.    8. Essential hypertension, benign  Stable. Refilled medication and checked labs.    - atenolol (TENORMIN) 50 MG tablet; Take 1 tablet (50 mg) by mouth daily  Dispense: 90 tablet; Refill: 3  - lisinopril (PRINIVIL/ZESTRIL) 40 MG tablet; Take 1 tablet (40 mg) by mouth daily  Dispense: 90 tablet; Refill: 3  - Basic metabolic panel      "

## 2017-03-17 NOTE — NURSING NOTE
"Chief Complaint   Patient presents with     Lipids     med recheck on all medication and refill       Cough     follow up on pneumonia from Dec 2016.   wondering if still needs to keep albuterol nebs on hand.       Initial /78 (BP Location: Right arm, Patient Position: Chair, Cuff Size: Adult Regular)  Pulse 61  Temp 98.1  F (36.7  C) (Tympanic)  Ht 5' 8\" (1.727 m)  Wt 133 lb 9.6 oz (60.6 kg)  SpO2 91%  BMI 20.31 kg/m2 Estimated body mass index is 20.31 kg/(m^2) as calculated from the following:    Height as of this encounter: 5' 8\" (1.727 m).    Weight as of this encounter: 133 lb 9.6 oz (60.6 kg).  Medication Reconciliation: complete    "

## 2017-03-17 NOTE — LETTER
[facility]  [address]  [place] [address]  Phone: [phone]    March 20, 2017    Chadwick Aguilar  [address] [place] [address]          Dear Chadwick,    The results of your recent lab tests were  Cholesterol is optimal. I would stay at your current dose of simvastatin. Other labs look good as well. Enclosed is a copy of these results.  If you have any further questions or problems, please contact our office.  Results for orders placed or performed in visit on 03/17/17   Spirometry, Breathing Capacity: Normal Order, Clinic Performed   Result Value Ref Range    FEV-1 0.60     FVC 2.11     FEV1/FVC 28%     FEF 25/75 0.18    Lipid Profile (Chol, Trig, HDL, LDL calc)   Result Value Ref Range    Cholesterol 133 <200 mg/dL    Triglycerides 62 <150 mg/dL    HDL Cholesterol 55 >39 mg/dL    LDL Cholesterol Calculated 66 <100 mg/dL    Non HDL Cholesterol 78 <130 mg/dL   Basic metabolic panel   Result Value Ref Range    Sodium 132 (L) 133 - 144 mmol/L    Potassium 4.6 3.4 - 5.3 mmol/L    Chloride 95 94 - 109 mmol/L    Carbon Dioxide 29 20 - 32 mmol/L    Anion Gap 8 3 - 14 mmol/L    Glucose 96 70 - 99 mg/dL    Urea Nitrogen 19 7 - 30 mg/dL    Creatinine 0.92 0.66 - 1.25 mg/dL    GFR Estimate 80 >60 mL/min/1.7m2    GFR Estimate If Black >90   GFR Calc   >60 mL/min/1.7m2    Calcium 9.1 8.5 - 10.1 mg/dL     Sincerely,      Jessi Hills MD/ llc

## 2017-08-09 ENCOUNTER — OFFICE VISIT (OUTPATIENT)
Dept: FAMILY MEDICINE | Facility: CLINIC | Age: 72
End: 2017-08-09
Payer: COMMERCIAL

## 2017-08-09 DIAGNOSIS — Z23 NEED FOR VACCINATION: ICD-10-CM

## 2017-08-09 DIAGNOSIS — Z00.00 WELL ADULT EXAM: Primary | ICD-10-CM

## 2017-08-09 DIAGNOSIS — I10 ESSENTIAL HYPERTENSION, BENIGN: ICD-10-CM

## 2017-08-09 DIAGNOSIS — I73.9 PVD (PERIPHERAL VASCULAR DISEASE) (H): ICD-10-CM

## 2017-08-09 DIAGNOSIS — F03.90 DEMENTIA WITHOUT BEHAVIORAL DISTURBANCE, UNSPECIFIED DEMENTIA TYPE: ICD-10-CM

## 2017-08-09 DIAGNOSIS — J44.9 CHRONIC OBSTRUCTIVE PULMONARY DISEASE, UNSPECIFIED COPD TYPE (H): ICD-10-CM

## 2017-08-09 DIAGNOSIS — E78.5 HYPERLIPIDEMIA LDL GOAL <70: ICD-10-CM

## 2017-08-09 PROCEDURE — G0438 PPPS, INITIAL VISIT: HCPCS | Performed by: FAMILY MEDICINE

## 2017-08-09 PROCEDURE — G0009 ADMIN PNEUMOCOCCAL VACCINE: HCPCS | Performed by: FAMILY MEDICINE

## 2017-08-09 PROCEDURE — 99213 OFFICE O/P EST LOW 20 MIN: CPT | Mod: 25 | Performed by: FAMILY MEDICINE

## 2017-08-09 PROCEDURE — 90732 PPSV23 VACC 2 YRS+ SUBQ/IM: CPT | Performed by: FAMILY MEDICINE

## 2017-08-09 RX ORDER — LISINOPRIL 40 MG/1
40 TABLET ORAL DAILY
Qty: 90 TABLET | Refills: 3 | Status: SHIPPED | OUTPATIENT
Start: 2017-08-09 | End: 2018-08-10

## 2017-08-09 RX ORDER — METOPROLOL SUCCINATE 100 MG/1
100 TABLET, EXTENDED RELEASE ORAL DAILY
Qty: 90 TABLET | Refills: 3 | Status: SHIPPED | OUTPATIENT
Start: 2017-08-09 | End: 2018-08-10

## 2017-08-09 RX ORDER — BUDESONIDE 0.5 MG/2ML
0.5 INHALANT ORAL 2 TIMES DAILY
Qty: 120 ML | Refills: 3 | Status: SHIPPED | OUTPATIENT
Start: 2017-08-09 | End: 2017-11-29

## 2017-08-09 ASSESSMENT — PAIN SCALES - GENERAL: PAINLEVEL: NO PAIN (0)

## 2017-08-09 ASSESSMENT — ANXIETY QUESTIONNAIRES
7. FEELING AFRAID AS IF SOMETHING AWFUL MIGHT HAPPEN: SEVERAL DAYS
6. BECOMING EASILY ANNOYED OR IRRITABLE: NOT AT ALL
2. NOT BEING ABLE TO STOP OR CONTROL WORRYING: NOT AT ALL
5. BEING SO RESTLESS THAT IT IS HARD TO SIT STILL: NOT AT ALL
GAD7 TOTAL SCORE: 1
1. FEELING NERVOUS, ANXIOUS, OR ON EDGE: NOT AT ALL
3. WORRYING TOO MUCH ABOUT DIFFERENT THINGS: NOT AT ALL

## 2017-08-09 ASSESSMENT — PATIENT HEALTH QUESTIONNAIRE - PHQ9
SUM OF ALL RESPONSES TO PHQ QUESTIONS 1-9: 1
5. POOR APPETITE OR OVEREATING: NOT AT ALL

## 2017-08-09 NOTE — PATIENT INSTRUCTIONS
Switch from Asmanex to Pulmicort nebs. Re-check spirometry in 1 month and if not better then add Spiriva. If still not better then pulmonology consult.    Switch from atenolol to Toprol XL 100mg. Follow-up for re-check in 1 month.     Preventive Health Recommendations:   Male Ages 65 and over    Yearly exam:             See your health care provider every year in order to  o   Review health changes.   o   Discuss preventive care.    o   Review your medicines if your doctor has prescribed any.    Talk with your health care provider about whether you should have a test to screen for prostate cancer (PSA).    Every 3 years, have a diabetes test (fasting glucose). If you are at risk for diabetes, you should have this test more often.    Every 5 years, have a cholesterol test. Have this test more often if you are at risk for high cholesterol or heart disease.     Every 10 years, have a colonoscopy. Or, have a yearly FIT test (stool test). These exams will check for colon cancer.    Talk to with your health care provider about screening for Abdominal Aortic Aneurysm if you have a family history of AAA or have a history of smoking.    Shots:     Get a flu shot each year.     Get a tetanus shot every 10 years.     Talk to your doctor about your pneumonia vaccines. There are now two you should receive - Pneumovax (PPSV 23) and Prevnar (PCV 13).     Talk to your doctor about a shingles vaccine.     Talk to your doctor about the hepatitis B vaccine.  Nutrition:     Eat at least 5 servings of fruits and vegetables each day.     Eat whole-grain bread, whole-wheat pasta and brown rice instead of white grains and rice.     Talk to your provider about Calcium and Vitamin D.   Lifestyle    Exercise for at least 150 minutes a week (30 minutes a day, 5 days a week). This will help you control your weight and prevent disease.     Limit alcohol to one drink per day.     No smoking.     Wear sunscreen to prevent skin cancer.     See  your dentist every six months for an exam and cleaning.     See your eye doctor every 1 to 2 years to screen for conditions such as glaucoma, macular degeneration, cataracts, etc   Preventive Health Recommendations:       Male Ages 65 and over    Yearly exam:             See your health care provider every year in order to  o   Review health changes.   o   Discuss preventive care.    o   Review your medicines if your doctor has prescribed any.  Talk with your health care provider about whether you should have a test to screen for prostate cancer (PSA).  Every 3 years, have a diabetes test (fasting glucose). If you are at risk for diabetes, you should have this test more often.  Every 5 years, have a cholesterol test. Have this test more often if you are at risk for high cholesterol or heart disease.   Every 10 years, have a colonoscopy. Or, have a yearly FIT test (stool test). These exams will check for colon cancer.  Talk to with your health care provider about screening for Abdominal Aortic Aneurysm if you have a family history of AAA or have a history of smoking.  Shots:   Get a flu shot each year.   Get a tetanus shot every 10 years.   Talk to your doctor about your pneumonia vaccines. There are now two you should receive - Pneumovax (PPSV 23) and Prevnar (PCV 13).  Talk to your doctor about a shingles vaccine.   Talk to your doctor about the hepatitis B vaccine.  Nutrition:   Eat at least 5 servings of fruits and vegetables each day.   Eat whole-grain bread, whole-wheat pasta and brown rice instead of white grains and rice.   Talk to your doctor about Calcium and Vitamin D.   Lifestyle  Exercise for at least 150 minutes a week (30 minutes a day, 5 days a week). This will help you control your weight and prevent disease.   Limit alcohol to one drink per day.   No smoking.   Wear sunscreen to prevent skin cancer.   See your dentist every six months for an exam and cleaning.   See your eye doctor every 1 to 2  years to screen for conditions such as glaucoma, macular degeneration and cataracts.

## 2017-08-09 NOTE — NURSING NOTE
"Chief Complaint   Patient presents with     Wellness Visit     trouble with shortness of breath from walking to the living room to the bedroom at home, heart will race at times       Initial /83 (BP Location: Right arm, Cuff Size: Adult Regular)  Pulse 61  Temp 98.2  F (36.8  C) (Tympanic)  Ht 5' 6.5\" (1.689 m)  Wt 140 lb (63.5 kg)  SpO2 96%  BMI 22.26 kg/m2 Estimated body mass index is 22.26 kg/(m^2) as calculated from the following:    Height as of this encounter: 5' 6.5\" (1.689 m).    Weight as of this encounter: 140 lb (63.5 kg).  Medication Reconciliation: complete    "

## 2017-08-09 NOTE — MR AVS SNAPSHOT
After Visit Summary   8/9/2017    Chadwick Aguilar    MRN: [id number]           Patient Information     Date Of Birth          1945        Visit Information        Provider Department      8/9/2017 10:40 AM Jessi Hills MD [facility]        Today's Diagnoses     Chronic obstructive pulmonary disease, unspecified COPD type (H)    -  1    Essential hypertension, benign          Care Instructions      Switch from Asmanex to Pulmicort nebs. Re-check spirometry in 1 month and if not better then add Spiriva. If still not better then pulmonology consult.    Switch from atenolol to Toprol XL 100mg. Follow-up for re-check in 1 month.     Preventive Health Recommendations:   Male Ages 65 and over    Yearly exam:             See your health care provider every year in order to  o   Review health changes.   o   Discuss preventive care.    o   Review your medicines if your doctor has prescribed any.    Talk with your health care provider about whether you should have a test to screen for prostate cancer (PSA).    Every 3 years, have a diabetes test (fasting glucose). If you are at risk for diabetes, you should have this test more often.    Every 5 years, have a cholesterol test. Have this test more often if you are at risk for high cholesterol or heart disease.     Every 10 years, have a colonoscopy. Or, have a yearly FIT test (stool test). These exams will check for colon cancer.    Talk to with your health care provider about screening for Abdominal Aortic Aneurysm if you have a family history of AAA or have a history of smoking.    Shots:     Get a flu shot each year.     Get a tetanus shot every 10 years.     Talk to your doctor about your pneumonia vaccines. There are now two you should receive - Pneumovax (PPSV 23) and Prevnar (PCV 13).     Talk to your doctor about a shingles vaccine.     Talk to your doctor about the hepatitis B vaccine.  Nutrition:     Eat at least 5  servings of fruits and vegetables each day.     Eat whole-grain bread, whole-wheat pasta and brown rice instead of white grains and rice.     Talk to your provider about Calcium and Vitamin D.   Lifestyle    Exercise for at least 150 minutes a week (30 minutes a day, 5 days a week). This will help you control your weight and prevent disease.     Limit alcohol to one drink per day.     No smoking.     Wear sunscreen to prevent skin cancer.     See your dentist every six months for an exam and cleaning.     See your eye doctor every 1 to 2 years to screen for conditions such as glaucoma, macular degeneration, cataracts, etc   Preventive Health Recommendations:       Male Ages 65 and over    Yearly exam:             See your health care provider every year in order to  o   Review health changes.   o   Discuss preventive care.    o   Review your medicines if your doctor has prescribed any.  Talk with your health care provider about whether you should have a test to screen for prostate cancer (PSA).  Every 3 years, have a diabetes test (fasting glucose). If you are at risk for diabetes, you should have this test more often.  Every 5 years, have a cholesterol test. Have this test more often if you are at risk for high cholesterol or heart disease.   Every 10 years, have a colonoscopy. Or, have a yearly FIT test (stool test). These exams will check for colon cancer.  Talk to with your health care provider about screening for Abdominal Aortic Aneurysm if you have a family history of AAA or have a history of smoking.  Shots:   Get a flu shot each year.   Get a tetanus shot every 10 years.   Talk to your doctor about your pneumonia vaccines. There are now two you should receive - Pneumovax (PPSV 23) and Prevnar (PCV 13).  Talk to your doctor about a shingles vaccine.   Talk to your doctor about the hepatitis B vaccine.  Nutrition:   Eat at least 5 servings of fruits and vegetables each day.   Eat whole-grain bread,  whole-wheat pasta and brown rice instead of white grains and rice.   Talk to your doctor about Calcium and Vitamin D.   Lifestyle  Exercise for at least 150 minutes a week (30 minutes a day, 5 days a week). This will help you control your weight and prevent disease.   Limit alcohol to one drink per day.   No smoking.   Wear sunscreen to prevent skin cancer.   See your dentist every six months for an exam and cleaning.   See your eye doctor every 1 to 2 years to screen for conditions such as glaucoma, macular degeneration and cataracts.          Follow-ups after your visit        Your next 10 appointments already scheduled     Aug 15, 2017 10:00 AM CDT   US LOWER EXTREMITY ARTERIAL DUPLEX LEFT with [id number]   [facility] ([facility])    [address] [id number] [phone]           Please bring a list of your medicines (including vitamins, minerals and over-the-counter drugs). Also, tell your doctor about any allergies you may have. Wear comfortable clothes and leave your valuables at home.  You do not need to do anything special to prepare for your exam.  Please call the Imaging Department at your exam site with any questions.            Aug 15, 2017 10:45 AM CDT   US LOKESH DOPPLER WITH EXERCISE with WYUS3   [facility] ([facility])    [address] [id number] [phone]           Please bring a list of your medicines (including vitamins, minerals and over-the-counter drugs). Also, tell your doctor about any allergies you may have. Wear comfortable clothes and leave your valuables at home.  No caffeine or tobacco for 1 hour prior to exam.  Please call the Imaging Department at your exam site with any questions.            Aug 15, 2017 11:45 AM CDT   [facility] Visit with Johnny Ann MD   [facility]    [address] [id number] [phone]     "        Sep 06, 2017  9:30 AM CDT   [facility]    [address] [address]   [phone]              Who to contact     If you have questions or need follow up information about today's clinic visit or your schedule please contact [facility] directly at [phone].  Normal or non-critical lab and imaging results will be communicated to you by [employer], letter or phone within 4 business days after the clinic has received the results. If you do not hear from us within 7 days, please contact the clinic through [employer] or phone. If you have a critical or abnormal lab result, we will notify you by phone as soon as possible.  Submit refill requests through [employer] or call your pharmacy and they will forward the refill request to us. Please allow 3 business days for your refill to be completed.          Additional Information About Your Visit        [employer] Information     [employer] lets you send messages to your doctor, view your test results, renew your prescriptions, schedule appointments and more. To sign up, go to [email or web address] . Click on \"Log in\" on the left side of the screen, which will take you to the Welcome page. Then click on \"Sign up Now\" on the right side of the page.     You will be asked to enter the access code listed below, as well as some personal information. Please follow the directions to create your username and password.     Your access code is: [email or web address]  Expires: 2017 11:27 AM     Your access code will  in 90 days. If you need help or a new code, please call your [facility] or [phone].        Care EveryWhere ID     This is your Care EveryWhere ID. This could be used by other organizations to access your [facility] medical records  [id number]        Your Vitals Were     Pulse Temperature Height Pulse Oximetry BMI (Body Mass Index)       64 98.2 " " F (36.8  C) (Tympanic) 5' 6.5\" (1.689 m) 96% 22.26 kg/m2        Blood Pressure from Last 3 Encounters:   08/09/17 150/74   03/17/17 118/69   03/08/17 140/87    Weight from Last 3 Encounters:   08/09/17 140 lb (63.5 kg)   03/17/17 133 lb 9.6 oz (60.6 kg)   03/08/17 133 lb 9.6 oz (60.6 kg)              Today, you had the following     No orders found for display         Today's Medication Changes          These changes are accurate as of: 8/9/17 11:27 AM.  If you have any questions, ask your nurse or doctor.               Start taking these medicines.        Dose/Directions    budesonide 0.5 MG/2ML neb solution   Commonly known as:  PULMICORT   Used for:  Chronic obstructive pulmonary disease, unspecified COPD type (H)   Started by:  Jessi Hills MD        Dose:  0.5 mg   Take 2 mLs (0.5 mg) by nebulization 2 times daily   Quantity:  120 mL   Refills:  3       metoprolol 100 MG 24 hr tablet   Commonly known as:  TOPROL-XL   Used for:  Essential hypertension, benign   Started by:  Jessi Hills MD        Dose:  100 mg   Take 1 tablet (100 mg) by mouth daily   Quantity:  90 tablet   Refills:  3         Stop taking these medicines if you haven't already. Please contact your care team if you have questions.     atenolol 50 MG tablet   Commonly known as:  TENORMIN   Stopped by:  Jessi Hills MD                Where to get your medicines      These medications were sent to [facility] [place], [place] - [address] [facility] [address]  [address] [facility] [address]     Phone:  [phone]     budesonide 0.5 MG/2ML neb solution    lisinopril 40 MG tablet    metoprolol 100 MG 24 hr tablet                Primary Care Provider Office Phone # Fax #    Jessi Hills [phone] [address]  [facility] [address]        Equal Access to Services     HEBERT LESLIE AH: pooja Juan Cox Bransonkristy " chloe amaya shalondacherise covingtonjacki carrillo ah. So [facility] [place] [phone].    ATENCIÓN: Si hilda veronica, tiene a tamez disposición servicios gratuitos de asistencia lingüística. Kwame al [phone].    We comply with applicable federal civil rights laws and [place] laws. We do not discriminate on the basis of race, color, national origin, age, disability sex, sexual orientation or gender identity.            Thank you!     Thank you for choosing [facility]  for your care. Our goal is always to provide you with excellent care. Hearing back from our patients is one way we can continue to improve our services. Please take a few minutes to complete the written survey that you may receive in the mail after your visit with us. Thank you!             Your Updated Medication List - Protect others around you: Learn how to safely use, store and throw away your medicines at www.disposemymeds.org.          This list is accurate as of: 8/9/17 11:27 AM.  Always use your most recent med list.                   Brand Name Dispense Instructions for use Diagnosis    albuterol (2.5 MG/3ML) 0.083% neb solution     25 vial    Take 1 vial by nebulization every 6 hours as needed for shortness of breath / dyspnea or wheezing Reported on 3/17/2017    Chronic obstructive pulmonary disease, unspecified COPD type (H)       aspirin 81 MG tablet      Take 81 mg by mouth daily        budesonide 0.5 MG/2ML neb solution    PULMICORT    120 mL    Take 2 mLs (0.5 mg) by nebulization 2 times daily    Chronic obstructive pulmonary disease, unspecified COPD type (H)       citalopram 20 MG tablet    celeXA    90 tablet    Take 1 tablet (20 mg) by mouth daily    Dementia without behavioral disturbance, unspecified dementia type       clopidogrel 75 MG tablet    PLAVIX    90 tablet    Take 1 tablet (75 mg) by mouth daily        lisinopril 40 MG tablet    PRINIVIL/ZESTRIL    90 tablet    Take 1 tablet (40 mg) by mouth daily     Essential hypertension, benign       memantine 10 MG tablet    NAMENDA    180 tablet    Take 1 tablet (10 mg) by mouth 2 times daily    Dementia without behavioral disturbance, unspecified dementia type       metoprolol 100 MG 24 hr tablet    TOPROL-XL    90 tablet    Take 1 tablet (100 mg) by mouth daily    Essential hypertension, benign       mometasone 220 MCG/INH Inhaler    ASMANEX 120 METERED DOSES    3 Inhaler    Inhale 2 puffs into the lungs 2 times daily    Chronic obstructive pulmonary disease, unspecified COPD type (H)       simvastatin 5 MG tablet    ZOCOR    90 tablet    Take 1 tablet (5 mg) by mouth At Bedtime    Arteriosclerotic vascular disease       VITAMIN B-1 PO      Take 100 mg by mouth daily

## 2017-08-09 NOTE — PROGRESS NOTES
SUBJECTIVE:   Chadwick Aguilar is a 72 year old male who presents for Preventive Visit.      Are you in the first 12 months of your Medicare Part B coverage?  No    Healthy Habits:    Do you get at least three servings of calcium containing foods daily (dairy, green leafy vegetables, etc.)? yes    Amount of exercise or daily activities, outside of work: 7 day(s) per week    Problems taking medications regularly No    Medication side effects: No    Have you had an eye exam in the past two years? no    Do you see a dentist twice per year? no    Do you have sleep apnea, excessive snoring or daytime drowsiness?no    COGNITIVE SCREEN  1) Repeat 3 items (Banana, Sunrise, Chair)    2) Clock draw: Normal  3) 3 item recall: Recalls NO objects   Results: 0 items recalled: PROBABLE COGNITIVE IMPAIRMENT, **INFORM PROVIDER**    Mini-CogTM Copyright S Kareem. Licensed by the author for use in [facility]; reprinted with permission (xochitl@[email or web address]). All rights reserved.                Chief Complaint   Patient presents with     Wellness Visit     trouble with shortness of breath from walking to the living room to the bedroom at home, heart will race at times        Reviewed and updated as needed this visit by clinical staffTobacco  Allergies  Meds  Problems  Med Hx  Surg Hx  Fam Hx  Soc Hx          Reviewed and updated as needed this visit by Provider  Allergies  Meds  Problems        Social History   Substance Use Topics     Smoking status: Light Tobacco Smoker     Packs/day: 0.25     Years: 50.00     Types: Cigarettes     Last attempt to quit: 12/23/2016     Smokeless tobacco: Never Used      Comment: uses patches PRN     Alcohol use Yes      Comment: occassionally            Standardized Alcohol Screening Questionnaire  AUDIT   Questions 0 1 2 3 4 Score   1. How often do you have a drink  containing alcohol? Never Monthly or less 2 to 4  times a  month 2 to 3  times a  week 4 or more  times a  week  4    2. How many drinks containing alcohol  do you have on a typical day when you are drinking? 1 or 2 3 or 4 5 or 6 7 to 9 10 or more  2   3. How often do you have more than five  or more drinks on one occasion? Never Less  than  monthly Monthly Weekly Daily or  almost  daily  1   4. How often during the last year have  you found that you were not able to stop drinking once you had started? Never Less  than  monthly Monthly Weekly Daily or  almost  daily  4   5. How often during the last year have  you failed to do what was normally expected of you because of drinking? Never Less  than  monthly Monthly Weekly Daily or  almost  daily  0   6. How often during the last year have  you needed a first drink in the morning to get yourself going after a heavy drinking session? Never Less  than  monthly Monthly Weekly Daily or  almost  daily  0   7. How often during the last year have you had a feeling of guilt or remorse after drinking? Never Less  than  monthly Monthly Weekly Daily or  almost  daily  0   8. How often during the last year have  you been unable to remember what happened the night before because of your drinking? Never Less  than  monthly Monthly Weekly Daily or  almost  daily  0   9. Have you or someone else been  injured because of your drinking? No  Yes, but not in the last year  Yes,  during the  last year  0   10. Has a relative, friend, doctor or other health care worker been concerned about your drinking or suggested you cut down? No  Yes, but not in the last year  Yes,  during the  last year  0   Total  11   Scoring: A score of 7 for adult men is an indication of hazardous drinking (risk for physical or physiological harm); a score of 8 or more is an indication of an alcohol use disorder. A score of 5 or more for adult women  is an indication of hazardous drinking or an alcohol use disorder.         Today's PHQ-2 Score:   PHQ-2 ( 1999 [employer]) 8/9/2017 8/9/2017   Q1: Little interest or pleasure in doing  things 0 0   Q2: Feeling down, depressed or hopeless 0 0   PHQ-2 Score 0 0       Do you feel safe in your environment - Yes    Do you have a Health Care Directive?: No: Advance care planning reviewed with patient; information given to patient to review.      Current providers sharing in care for this patient include: Patient Care Team:  Jessi Hills MD as PCP - General (Family Practice)      Hearing impairment: No    Ability to successfully perform activities of daily living: Yes, no assistance needed     Fall risk:      Home safety:  lack of grab bars in the bathroom      The following health maintenance items are reviewed in Epic and correct as of today:  Health Maintenance   Topic Date Due     AORTIC ANEURYSM SCREENING (SYSTEM ASSIGNED)  03/11/2008     COLONOSCOPY Q5 YR  07/12/2017     INFLUENZA VACCINE (SYSTEM ASSIGNED)  09/01/2017     COPD ACTION PLAN Q1 YR  01/20/2018     FALL RISK ASSESSMENT  08/09/2018     LIPID SCREEN Q5 YR MALE (SYSTEM ASSIGNED)  03/17/2022     ADVANCE DIRECTIVE PLANNING Q5 YRS  03/17/2022     TETANUS IMMUNIZATION (SYSTEM ASSIGNED)  07/25/2026     SPIROMETRY ONETIME  Completed     PNEUMOCOCCAL  Completed     Labs reviewed in EPIC  BP Readings from Last 3 Encounters:   09/06/17 132/77   08/15/17 (!) 173/92   08/09/17 149/74    Wt Readings from Last 3 Encounters:   09/06/17 145 lb 12.8 oz (66.1 kg)   08/09/17 140 lb (63.5 kg)   03/17/17 133 lb 9.6 oz (60.6 kg)                  Patient Active Problem List   Diagnosis     PVD (peripheral vascular disease) (H)     Chronic obstructive pulmonary disease, unspecified COPD type (H)     Dementia without behavioral disturbance, unspecified dementia type     Claudication (H)     Arteriosclerotic vascular disease     Essential hypertension, benign     Hyperlipidemia LDL goal <70     Advanced directives, counseling/discussion     History reviewed. No pertinent surgical history.    Social History   Substance Use Topics     Smoking status:  "Light Tobacco Smoker     Packs/day: 0.25     Years: 50.00     Types: Cigarettes     Last attempt to quit: 12/23/2016     Smokeless tobacco: Never Used      Comment: uses patches PRN     Alcohol use Yes      Comment: occassionally     History reviewed. No pertinent family history.      Current Outpatient Prescriptions   Medication Sig Dispense Refill     budesonide (PULMICORT) 0.5 MG/2ML neb solution Take 2 mLs (0.5 mg) by nebulization 2 times daily 120 mL 3     metoprolol (TOPROL-XL) 100 MG 24 hr tablet Take 1 tablet (100 mg) by mouth daily 90 tablet 3     lisinopril (PRINIVIL/ZESTRIL) 40 MG tablet Take 1 tablet (40 mg) by mouth daily 90 tablet 3     simvastatin (ZOCOR) 5 MG tablet Take 1 tablet (5 mg) by mouth At Bedtime 90 tablet 3     citalopram (CELEXA) 20 MG tablet Take 1 tablet (20 mg) by mouth daily 90 tablet 3     memantine (NAMENDA) 10 MG tablet Take 1 tablet (10 mg) by mouth 2 times daily 180 tablet 3     clopidogrel (PLAVIX) 75 MG tablet Take 1 tablet (75 mg) by mouth daily 90 tablet 1     aspirin 81 MG tablet Take 81 mg by mouth daily       Thiamine HCl (VITAMIN B-1 PO) Take 250 mg by mouth daily        No Known Allergies        Pneumonia Vaccine: Had PCV 13 last year. Due for Pneumovax.    ROS:  Constitutional, neuro, ENT, endocrine, pulmonary, cardiac, gastrointestinal, genitourinary, musculoskeletal, integument and psychiatric systems are negative, except as otherwise noted.       OBJECTIVE:   /74 (BP Location: Right arm, Cuff Size: Adult Regular)  Pulse 64  Temp 98.2  F (36.8  C) (Tympanic)  Ht 5' 6.5\" (1.689 m)  Wt 140 lb (63.5 kg)  SpO2 96%  BMI 22.26 kg/m2 Estimated body mass index is 22.26 kg/(m^2) as calculated from the following:    Height as of this encounter: 5' 6.5\" (1.689 m).    Weight as of this encounter: 140 lb (63.5 kg).  EXAM:   GENERAL: healthy, alert and no distress  EYES: Eyes grossly normal to inspection, PERRL and conjunctivae and sclerae normal  HENT: ear canals and " TM's normal, nose and mouth without ulcers or lesions  NECK: no adenopathy, no asymmetry, masses, or scars and thyroid normal to palpation  RESP: lungs clear to auscultation - no rales, rhonchi or wheezes. Decreased air movement throughout.  CV: regular rate and rhythm, normal S1 S2, no S3 or S4, no murmur, click or rub, no peripheral edema and peripheral pulses strong  ABDOMEN: soft, nontender, no hepatosplenomegaly, no masses and bowel sounds normal  MS: no gross musculoskeletal defects noted, no edema  SKIN: no suspicious lesions or rashes  NEURO: Normal strength and tone, mentation intact and speech normal  PSYCH: mentation appears normal, affect normal/bright    ASSESSMENT / PLAN:   1. Well adult exam    2. Chronic obstructive pulmonary disease, unspecified COPD type (H)  Suboptimally controlled. Severe obstruction os spirometry. Asmanex did not help symptoms subjectively. Switch from Asmanex to Pulmicort nebs. Re-check spirometry in 1 month and if not better then add Spiriva. If still not better then pulmonology consult.  - budesonide (PULMICORT) 0.5 MG/2ML neb solution; Take 2 mLs (0.5 mg) by nebulization 2 times daily  Dispense: 120 mL; Refill: 3    3. PVD (peripheral vascular disease) (H)  Following with vascular surgery.    4. Dementia without behavioral disturbance, unspecified dementia type  Stable     5. Essential hypertension, benign  Suboptimally controlled.   Switch from atenolol to Toprol XL 100mg. Follow-up for re-check in 1 month.   - metoprolol (TOPROL-XL) 100 MG 24 hr tablet; Take 1 tablet (100 mg) by mouth daily  Dispense: 90 tablet; Refill: 3  - lisinopril (PRINIVIL/ZESTRIL) 40 MG tablet; Take 1 tablet (40 mg) by mouth daily  Dispense: 90 tablet; Refill: 3    6. Hyperlipidemia LDL goal <70  Stable     7. Need for vaccination  - Pneumococcal vaccine 23 valent PPSV23  (Pneumovax) [[id number]]  - ADMIN MEDICARE: Pneumococcal Vaccine ()        Switch from atenolol to Toprol XL 100mg. Follow-up for  "re-check in 1 month.       End of Life Planning:  Patient currently has an advanced directive: No.  I have verified the patient's ablity to prepare an advanced directive/make health care decisions.  Literature was provided to assist patient in preparing an advanced directive.    COUNSELING:  Reviewed preventive health counseling, as reflected in patient instructions        Estimated body mass index is 22.26 kg/(m^2) as calculated from the following:    Height as of this encounter: 5' 6.5\" (1.689 m).    Weight as of this encounter: 140 lb (63.5 kg).  Weight management plan noted, stable and monitoring   reports that he has been smoking Cigarettes.  He has a 12.50 pack-year smoking history. He has never used smokeless tobacco.  Tobacco Cessation Action Plan: Information offered: Patient not interested at this time    Appropriate preventive services were discussed with this patient, including applicable screening as appropriate for cardiovascular disease, diabetes, osteopenia/osteoporosis, and glaucoma.  As appropriate for age/gender, discussed screening for colorectal cancer, prostate cancer, breast cancer, and cervical cancer. Checklist reviewing preventive services available has been given to the patient.    Reviewed patients plan of care and provided an AVS. The Intermediate Care Plan ( asthma action plan, low back pain action plan, and migraine action plan) for Chadwick meets the Care Plan requirement. This Care Plan has been established and reviewed with the Patient and spouse.    Counseling Resources:  ATP IV Guidelines  Pooled Cohorts Equation Calculator  Breast Cancer Risk Calculator  FRAX Risk Assessment  ICSI Preventive Guidelines  Dietary Guidelines for Americans, 2010  USDA's MyPlate  ASA Prophylaxis  Lung CA Screening    Jessi Hills MD  [facility]  "

## 2017-08-10 ASSESSMENT — ANXIETY QUESTIONNAIRES: GAD7 TOTAL SCORE: 1

## 2017-08-15 ENCOUNTER — HOSPITAL ENCOUNTER (OUTPATIENT)
Dept: ULTRASOUND IMAGING | Facility: CLINIC | Age: 72
End: 2017-08-15
Attending: SURGERY
Payer: COMMERCIAL

## 2017-08-15 ENCOUNTER — HOSPITAL ENCOUNTER (OUTPATIENT)
Dept: ULTRASOUND IMAGING | Facility: CLINIC | Age: 72
Discharge: HOME | End: 2017-08-15
Attending: SURGERY | Admitting: SURGERY
Payer: COMMERCIAL

## 2017-08-15 ENCOUNTER — OFFICE VISIT (OUTPATIENT)
Dept: VASCULAR SURGERY | Facility: CLINIC | Age: 72
End: 2017-08-15
Payer: COMMERCIAL

## 2017-08-15 VITALS — SYSTOLIC BLOOD PRESSURE: 173 MMHG | DIASTOLIC BLOOD PRESSURE: 92 MMHG | RESPIRATION RATE: 16 BRPM | HEART RATE: 93 BPM

## 2017-08-15 DIAGNOSIS — I70.219 EXTREMITY ATHEROSCLEROSIS WITH INTERMITTENT CLAUDICATION (H): ICD-10-CM

## 2017-08-15 DIAGNOSIS — I73.9 PAD (PERIPHERAL ARTERY DISEASE) (H): Primary | ICD-10-CM

## 2017-08-15 PROCEDURE — 99213 OFFICE O/P EST LOW 20 MIN: CPT | Performed by: SURGERY

## 2017-08-15 PROCEDURE — 93924 LWR XTR VASC STDY BILAT: CPT

## 2017-08-15 PROCEDURE — 93926 LOWER EXTREMITY STUDY: CPT | Mod: LT

## 2017-08-15 NOTE — PROGRESS NOTES
Mr. Aguilar returns to clinic today.  He is a 72-year-old male who had previously undergone a left iliac stenting in [place].  He had established vascular care with us.  He does not report any claudication.  On exam his left femoral pulse is 1+.  This is definitely different from when I examined him in January of this year.     I personally reviewed the imaging studies that were performed today.    STUDY #1  US LOKESH DOPPLER WITH EXERCISE  8/15/2017 10:59 AM      HISTORY: Atherosclerosis of native arteries of extremities with  intermittent claudication, unspecified extremity     COMPARISON: 1/27/2017     FINDINGS:   The patient walked on a treadmill for 3 minutes at a 12% incline and  at a speed of 1.0 miles per hour     Patient had bilateral thigh tightness 2 minutes into exercise.  Exercise was stopped due to shortness of breath.     The resting and exercise ABIs on the right are 0.84 and 1.01  respectively versus 0.96 and 0.97 on the prior exam.     The resting and exercise ABIs on the left are  0.75 and 0.67  respectively versus 0.95 and 0.85 on the prior exam.     Waveform analysis indicates multiphasic waveforms in the sampled  arteries of the lower extremities with exception to the left dorsalis  pedis artery in which the waveforms are monophasic.         IMPRESSION: There has been an interval decline in ABIs in the left  lower extremity. Findings would now indicate mild to moderate left  lower extremity arterial insufficiency slightly worsened following  exercise.     PURNIMA HAND MD    STUDY #2  US LOWER EXTREMITY ARTERIAL DUPLEX LEFT  8/15/2017 10:00 AM      HISTORY:  72-year-old male status post a left iliac stent placed on  12/6/2016 followed by angioplasty on 12/22/2016.     COMPARISON: Ultrasound dated 1/27/2017     FINDINGS: Color Doppler and spectral waveform analysis performed.     On the color Doppler imaging, sonographic findings suggest a stenosis  within the proximal aspects of a left  external iliac artery stent  where the luminal diameter is measured to be 2.3 mm. No significant  associated elevation in velocity is detected in this area.     If clinically indicated, further evaluation with an angiogram or CT  angiogram could be performed.     PURNIMA HAND MD    Diagnosis: Peripheral arterial disease.    Plan: I explained the current findings to the patient and to his wife.  I explained that there has been interval progression of disease and decline in his ankle brachial indices.  On clinical examination as left femoral pulse is certainly weaker.  We will now proceed with a CT arteriogram with runoff for further evaluation.  I explained the need for continued surveillance and if necessary preemptive action to prevent the stent or the left common femoral artery from occluding.  I will call him once we have evaluated the CT arteriogram.

## 2017-08-15 NOTE — NURSING NOTE
"Chief Complaint   Patient presents with     Consult     left leg imaging       Initial BP (!) 173/92 (BP Location: Right arm, Patient Position: Chair, Cuff Size: Adult Regular)  Pulse 93  Resp 16 Estimated body mass index is 22.26 kg/(m^2) as calculated from the following:    Height as of 8/9/17: 1.689 m (5' 6.5\").    Weight as of 8/9/17: 63.5 kg (140 lb).  Medication Reconciliation: complete.  fabrizio gallagher LPN      "

## 2017-08-16 DIAGNOSIS — I70.209 ATHEROSCLEROSIS OF NATIVE ARTERY OF EXTREMITY (H): Primary | ICD-10-CM

## 2017-08-17 RX ORDER — IOPAMIDOL 755 MG/ML
125 INJECTION, SOLUTION INTRAVASCULAR ONCE
Status: COMPLETED | OUTPATIENT
Start: 2017-08-18 | End: 2017-08-18

## 2017-08-18 ENCOUNTER — HOSPITAL ENCOUNTER (OUTPATIENT)
Dept: CT IMAGING | Facility: CLINIC | Age: 72
Discharge: HOME | End: 2017-08-18
Attending: SURGERY | Admitting: SURGERY
Payer: COMMERCIAL

## 2017-08-18 DIAGNOSIS — I70.209 ATHEROSCLEROSIS OF NATIVE ARTERY OF EXTREMITY (H): ICD-10-CM

## 2017-08-18 LAB
CREAT BLD-MCNC: 0.8 MG/DL (ref 0.66–1.25)
GFR SERPL CREATININE-BSD FRML MDRD: >90 ML/MIN/1.7M2

## 2017-08-18 PROCEDURE — 25000128 H RX IP 250 OP 636: Performed by: RADIOLOGY

## 2017-08-18 PROCEDURE — 82565 ASSAY OF CREATININE: CPT

## 2017-08-18 PROCEDURE — 75635 CT ANGIO ABDOMINAL ARTERIES: CPT

## 2017-08-18 PROCEDURE — 25000125 ZZHC RX 250: Performed by: RADIOLOGY

## 2017-08-18 RX ADMIN — IOPAMIDOL 125 ML: 755 INJECTION, SOLUTION INTRAVENOUS at 11:02

## 2017-08-18 RX ADMIN — SODIUM CHLORIDE 100 ML: 9 INJECTION, SOLUTION INTRAVENOUS at 11:02

## 2017-09-06 ENCOUNTER — OFFICE VISIT (OUTPATIENT)
Dept: NEUROLOGY | Facility: CLINIC | Age: 72
End: 2017-09-06
Payer: COMMERCIAL

## 2017-09-06 VITALS
BODY MASS INDEX: 23.18 KG/M2 | OXYGEN SATURATION: 95 % | HEART RATE: 58 BPM | WEIGHT: 145.8 LBS | DIASTOLIC BLOOD PRESSURE: 77 MMHG | SYSTOLIC BLOOD PRESSURE: 132 MMHG

## 2017-09-06 DIAGNOSIS — R41.89 COGNITIVE IMPAIRMENT: Primary | ICD-10-CM

## 2017-09-06 PROCEDURE — 99214 OFFICE O/P EST MOD 30 MIN: CPT | Performed by: PSYCHIATRY & NEUROLOGY

## 2017-09-06 NOTE — PROGRESS NOTES
ESTABLISHED PATIENT NEUROLOGY NOTE    DATE OF VISIT: 9/6/2017  MRN: [id number]  PATIENT NAME: Chadwick Aguilar  YOB: 1945    Chief Complaint   Patient presents with     RECHECK     Cognitive impairment     SUBJECTIVE:                                                      HISTORY OF PRESENT ILLNESS:  Chadwick is here for follow up regarding dementia. The patient was previously followed for memory issues at [facility]. We requested records and there is little helpful documentation except for report of a CPT score of 4.9/5.6 and documentation of the decision to start Namenda (8.2016, Dr. Marie).     When I met the patient about 6 months ago, he and his family reported worsened memory after two procedures requiring anesthesia with some subsequent improvement over time.     The patient is here with his wife.He has no concerns. She says that she has not noticed any change. There are some short term memory issues, but these come and go. The wife tells me that she did not believe there were any problems with ADLs, though he had scored poorly on dressing and map-reading. But, the wife says that she does have to urge him to shower. The patient says that he just does things the old way. He feels that his mood and behavior are normal. He denies balance problems though his wife comments that she thinks that he is afraid he will fall in the shower.     The patient tells me that he does not want to do any additional testing for his memory. They are currently dealing with some cardiac/vascular issues - see recent vascular imaging below, and prefer to focus on this.     The patient's grandmother had dementia.    I did find the results of his head CT from about one year ago:  FINDINGS:   Moderate generalized volume loss slightly more prominent frontal parietal   regions.  There is no evidence of acute hemorrhage, mass effect, midline   shift or extra-axial fluid collection.  Mild parasagittal white  matter   changes.  No focal areas of encephalomalacia identified.  Visible portion   of the paranasal sinuses show incompletely formed frontal sinuses with   opacification of the left frontal ethmoidal recess.  Middle ears and   mastoid air cells are well pneumatized.     CURRENT MEDICATIONS:     Current Outpatient Prescriptions on File Prior to Visit:  budesonide (PULMICORT) 0.5 MG/2ML neb solution Take 2 mLs (0.5 mg) by nebulization 2 times daily   metoprolol (TOPROL-XL) 100 MG 24 hr tablet Take 1 tablet (100 mg) by mouth daily   lisinopril (PRINIVIL/ZESTRIL) 40 MG tablet Take 1 tablet (40 mg) by mouth daily   simvastatin (ZOCOR) 5 MG tablet Take 1 tablet (5 mg) by mouth At Bedtime   citalopram (CELEXA) 20 MG tablet Take 1 tablet (20 mg) by mouth daily   memantine (NAMENDA) 10 MG tablet Take 1 tablet (10 mg) by mouth 2 times daily   clopidogrel (PLAVIX) 75 MG tablet Take 1 tablet (75 mg) by mouth daily   aspirin 81 MG tablet Take 81 mg by mouth daily   Thiamine HCl (VITAMIN B-1 PO) Take 250 mg by mouth daily      No current facility-administered medications on file prior to visit.     RECENT DIAGNOSTIC STUDIES:   Results for orders placed or performed during the hospital encounter of 08/18/17   CTA Angiogram Abd Aorta Bilataeral Iliofem    Narrative    CTA ANGIOGRAM ABDOMEN AORTA BILATERAL ILIOFEMORAL RUNOFF  8/18/2017  1:52 PM     HISTORY:  72-year-old man status post a left external iliac artery  stent placed on 12/6/2016. Patient was noted to have a decline in ABIs  in the left lower extremity. There was a question of a stenosis in the  proximal aspects of the left common iliac artery stent.    COMPARISON: Ultrasound dated 8/15/2017 and ankle-brachial indices  dated 8/15/2017.    TECHNIQUE: CT angiogram of the abdomen and pelvis with bilateral lower  extremity runoff was performed following the administration of 125 mL  Isovue-370 contrast. Images are viewed in multiple planes and 3-D  reconstructions were also  performed. Radiation dose for this scan was  reduced using automated exposure control, adjustment of the mA and/or  kV according to patient size, or iterative reconstruction technique.    FINDINGS:   Abdominal aorta: There is scattered calcified plaque throughout the  abdominal aorta. This does not contribute to a significant stenosis or  aneurysmal dilatation. The celiac trunk, right and left renal  arteries, and inferior mesenteric arteries are patent without  significant stenosis. There is a mild to moderate stenosis at the  origin of the superior mesenteric artery.    Iliac arteries:  The left common iliac artery is ectatic and has a maximum diameter of  16 mm. A left external iliac artery stent is patent. The left external  iliac artery is small in caliber with diameters ranging between 4 to 5  mm.    There is a mild stenosis in the proximal right external iliac artery.  There is a mild to moderate stenosis in the mid right external iliac  artery where the diameter measures approximately 3 to 4 mm.    Left lower extremity angiogram:  Common femoral artery: Eccentric calcified plaque contributes to a  mild stenosis.  Profunda femoris artery: No significant stenosis.  Superficial femoral artery: Scattered calcified plaque. Mild to  moderate stenosis in the proximal/mid thigh where the diameter of the  artery measures approximately 3 mm.    Popliteal artery: No significant stenosis.  Tibial arteries: Three-vessel runoff. The posterior tibial and  peroneal arteries are the dominant runoff vessels.    Right lower extremity angiogram:  Common femoral artery: Mild stenosis.  Profunda femoris artery: No significant stenosis.  Superficial femoral artery: Scattered calcified plaque contributes to  mild stenoses. There are moderate stenoses in the proximal and mid  thigh where the luminal diameter narrows to 2.5 to 3 mm.  Popliteal artery: Mild stenoses above the knee.  Tibial arteries: Three-vessel runoff. The posterior  tibial and  peroneal arteries are the dominant runoff vessels.    Soft tissues: The solid organs in the abdomen appear unremarkable.  There are a few scattered colonic diverticuli. The bowel otherwise  appears grossly unremarkable. Mild bladder wall thickening.      Impression    IMPRESSION:  The left common iliac artery stent is diffusely small in caliber but  no definite significant stenosis is identified.  2. Mild to moderate stenosis in the mid right external iliac artery.  3. Mild and moderate stenoses in the superficial femoral arteries in  the proximal and mid thigh.    PURNIMA HAND MD   Creatinine POCT   Result Value Ref Range    Creatinine 0.8 0.66 - 1.25 mg/dL    GFR Estimate >90 >60 mL/min/1.7m2    GFR Estimate If Black >90 >60 mL/min/1.7m2         REVIEW OF SYSTEMS:                                                      Review of systems is negative except as mentioned above in HPI.     EXAM:                                                      Physical Exam:   Vitals: /77 (BP Location: Right arm, Patient Position: Chair, Cuff Size: Adult Regular)  Pulse 58  Wt 145 lb 12.8 oz (66.1 kg)  SpO2 95%  BMI 23.18 kg/m2  BMI= Body mass index is 23.18 kg/(m^2).  Patient does not want to do any additional testing at this time. Exam deferred.     ASSESSMENT and PLAN:                                                      Assessment and Plan:    ICD-10-CM    1. Cognitive impairment R41.89        Mr. Aguilar is a pleasant 71 yo man with history of vascular disease, HTN, HLD and previous diagnosis of dementia without clear work-up other than a CPT. Prior records reviewed. He did test below normal on the MoCA I did in clinic at his previous visit, indicating that he does likely have some underlying memory impairment. That being said, after a long discussion, he is not interested in further work-up at this time. He did not even want me to repeat testing in clinic today. His wife agrees that he does not need  any further evaluation from her standpoint. It does sound like he is functioning well at home, for the most part. His wife will monitor for any concerning issues/behaviors and they will contact me if they change their mind. He is willing to continue the Namenda in the meantime. Will forward my note to referring primary care provider.       Total Time: 25 minutes were spent with the patient. More than 50% of the time spent on counseling (as described above in Assessment and Plan) /coordinating the care.    Brooke Liang MD  Neurology    CC: Jessi Hills MD

## 2017-09-06 NOTE — NURSING NOTE
"Chief Complaint   Patient presents with     RECHECK     Cognitive impairment       Initial /77 (BP Location: Right arm, Patient Position: Chair, Cuff Size: Adult Regular)  Pulse 58  Wt 145 lb 12.8 oz (66.1 kg)  SpO2 95%  BMI 23.18 kg/m2 Estimated body mass index is 23.18 kg/(m^2) as calculated from the following:    Height as of 8/9/17: 5' 6.5\" (1.689 m).    Weight as of this encounter: 145 lb 12.8 oz (66.1 kg).  Medication Reconciliation: complete    Patient prefers to be contacted: letter  Okay to leave detailed message on voicemail: n/a    Amelia SEPULVEDA-CMA    "

## 2017-09-07 ENCOUNTER — OFFICE VISIT (OUTPATIENT)
Dept: FAMILY MEDICINE | Facility: CLINIC | Age: 72
End: 2017-09-07
Payer: COMMERCIAL

## 2017-09-07 VITALS
TEMPERATURE: 98.2 F | SYSTOLIC BLOOD PRESSURE: 149 MMHG | HEIGHT: 67 IN | HEART RATE: 64 BPM | WEIGHT: 140 LBS | DIASTOLIC BLOOD PRESSURE: 74 MMHG | OXYGEN SATURATION: 96 % | BODY MASS INDEX: 21.97 KG/M2

## 2017-09-07 VITALS
TEMPERATURE: 97.5 F | DIASTOLIC BLOOD PRESSURE: 79 MMHG | BODY MASS INDEX: 23.01 KG/M2 | OXYGEN SATURATION: 94 % | HEIGHT: 67 IN | HEART RATE: 60 BPM | WEIGHT: 146.6 LBS | SYSTOLIC BLOOD PRESSURE: 148 MMHG

## 2017-09-07 DIAGNOSIS — Z12.11 SCREENING FOR COLON CANCER: ICD-10-CM

## 2017-09-07 DIAGNOSIS — J44.9 CHRONIC OBSTRUCTIVE PULMONARY DISEASE, UNSPECIFIED COPD TYPE (H): Primary | ICD-10-CM

## 2017-09-07 DIAGNOSIS — F03.90 DEMENTIA WITHOUT BEHAVIORAL DISTURBANCE, UNSPECIFIED DEMENTIA TYPE: ICD-10-CM

## 2017-09-07 DIAGNOSIS — Z23 NEED FOR PROPHYLACTIC VACCINATION AND INOCULATION AGAINST INFLUENZA: ICD-10-CM

## 2017-09-07 PROCEDURE — 99214 OFFICE O/P EST MOD 30 MIN: CPT | Mod: 25 | Performed by: FAMILY MEDICINE

## 2017-09-07 PROCEDURE — G0008 ADMIN INFLUENZA VIRUS VAC: HCPCS | Performed by: FAMILY MEDICINE

## 2017-09-07 PROCEDURE — 90662 IIV NO PRSV INCREASED AG IM: CPT | Performed by: FAMILY MEDICINE

## 2017-09-07 NOTE — PATIENT INSTRUCTIONS
Thank you for choosing [facility].  You may be receiving a survey in the mail from [facility] regarding your visit today.  Please take a few minutes to complete and return the survey to let us know how we are doing.      Our Clinic hours are:  Mondays    7:20 am - 7 pm  Tues -  Fri  7:20 am - 5 pm    Clinic Phone: [phone]    The clinic lab opens at 7:30 am Mon - Fri and appointments are required.    [facility]. [phone]  Monday-Thursday 8 am - 7pm  Tues/Wed/Fri 8 am - 5:30 pm

## 2017-09-07 NOTE — PROGRESS NOTES
SUBJECTIVE:   Chadwick Aguilar is a 72 year old male who presents to clinic today for the following health issues:      Problem:   Chief Complaint   Patient presents with     Dementia     follow up.  Saw Dr. Liang 9/6/17 for follow-up of dementia. At that visit after discussion he and family decided not to pursue further evaluation. Can follow-up if worsening memory symptoms but stable for now.     COPD     follow up on Pulmicort, has noticed improvement with this.     Health Maintenance     due for colonoscopy.  pt's brother has hx of colon cancer.  last colonoscopy was in 7/2012      Flu Shot     ADDITIONAL HPI: 72 year old male here for above issue.    ROS: 10 point review of systems negative except as per HPI.    PAST MEDICAL HISTORY:  History reviewed. No pertinent past medical history.     ACTIVE MEDICAL PROBLEMS:  Patient Active Problem List   Diagnosis     PVD (peripheral vascular disease) (H)     Chronic obstructive pulmonary disease, unspecified COPD type (H)     Dementia without behavioral disturbance, unspecified dementia type     Claudication (H)     Arteriosclerotic vascular disease     Essential hypertension, benign     Hyperlipidemia LDL goal <70     Advanced directives, counseling/discussion        FAMILY HISTORY:  History reviewed. No pertinent family history.    SOCIAL HISTORY:  Social History     Social History     Marital status:      Spouse name: N/A     Number of children: N/A     Years of education: N/A     Occupational History     Not on file.     Social History Main Topics     Smoking status: Light Tobacco Smoker     Packs/day: 0.25     Years: 50.00     Types: Cigarettes     Last attempt to quit: 12/23/2016     Smokeless tobacco: Never Used      Comment: uses patches PRN     Alcohol use Yes      Comment: occassionally     Drug use: No     Sexual activity: Not on file     Other Topics Concern     Not on file     Social History Narrative       MEDICATIONS:  Current Outpatient  "Prescriptions   Medication Sig Dispense Refill     budesonide (PULMICORT) 0.5 MG/2ML neb solution Take 2 mLs (0.5 mg) by nebulization 2 times daily 120 mL 3     metoprolol (TOPROL-XL) 100 MG 24 hr tablet Take 1 tablet (100 mg) by mouth daily 90 tablet 3     lisinopril (PRINIVIL/ZESTRIL) 40 MG tablet Take 1 tablet (40 mg) by mouth daily 90 tablet 3     simvastatin (ZOCOR) 5 MG tablet Take 1 tablet (5 mg) by mouth At Bedtime 90 tablet 3     citalopram (CELEXA) 20 MG tablet Take 1 tablet (20 mg) by mouth daily 90 tablet 3     memantine (NAMENDA) 10 MG tablet Take 1 tablet (10 mg) by mouth 2 times daily 180 tablet 3     clopidogrel (PLAVIX) 75 MG tablet Take 1 tablet (75 mg) by mouth daily 90 tablet 1     aspirin 81 MG tablet Take 81 mg by mouth daily       Thiamine HCl (VITAMIN B-1 PO) Take 250 mg by mouth daily          ALLERGIES:   No Known Allergies    Problem list, Medication list, Allergies, and Medical/Social/Surgical histories reviewed in [facility] and updated as appropriate.    OBJECTIVE:                                                    VITALS: /79 (BP Location: Right arm, Cuff Size: Adult Regular)  Pulse 60  Temp 97.5  F (36.4  C) (Tympanic)  Ht 5' 6.5\" (1.689 m)  Wt 146 lb 9.6 oz (66.5 kg)  SpO2 94%  BMI 23.31 kg/m2 Body mass index is 23.31 kg/(m^2).  GENERAL: Pleasant, well appearing male.  LUNGS: CTAB, normal effort.   NEURO: Cranial nerves II through XII grossly intact. No focal deficits.     ASSESSMENT/PLAN:                                                    1. Chronic obstructive pulmonary disease, unspecified COPD type (H)  Stable. Doing well on Pulmicort. Follow-up if worsening.     2. Dementia without behavioral disturbance, unspecified dementia type  Stable. Holding off on further work-up or treatment. Will follow-up if worsening.     3. Screening for colon cancer  He would prefer not to do colonoscopy. Given co-morbidities I think FIT is a reasonable alternative.  - Fecal colorectal cancer " screen (FIT); Future    4. Need for prophylactic vaccination and inoculation against influenza  - FLU VACCINE, INCREASED ANTIGEN, PRESV FREE, AGE 65+ [28390]  - Vaccine Administration, Initial [12162]         Injectable Influenza Immunization Documentation    1.  Is the person to be vaccinated sick today?  No    2. Does the person to be vaccinated have an allergy to eggs or to a component of the vaccine?  No    3. Has the person to be vaccinated today ever had a serious reaction to influenza vaccine in the past?  No    4. Has the person to be vaccinated ever had Guillain-Sarah Ann syndrome?  No     Form completed by Antonia Neely MA

## 2017-09-07 NOTE — NURSING NOTE
"Chief Complaint   Patient presents with     Dementia     follow up.  Saw Dr. Liang 9/6/17     COPD     follow up on Pulmicort,          Health Maintenance     due for colonoscopy.  pt's brother has hx of colon cancer.  last colonoscopy was in 7/2012      Flu Shot       Initial /79 (BP Location: Right arm, Cuff Size: Adult Regular)  Pulse 60  Temp 97.5  F (36.4  C) (Tympanic)  Ht 5' 6.5\" (1.689 m)  Wt 146 lb 9.6 oz (66.5 kg)  SpO2 94%  BMI 23.31 kg/m2 Estimated body mass index is 23.31 kg/(m^2) as calculated from the following:    Height as of this encounter: 5' 6.5\" (1.689 m).    Weight as of this encounter: 146 lb 9.6 oz (66.5 kg).  Medication Reconciliation: complete    "

## 2017-11-29 ENCOUNTER — OFFICE VISIT (OUTPATIENT)
Dept: FAMILY MEDICINE | Facility: CLINIC | Age: 72
End: 2017-11-29
Payer: COMMERCIAL

## 2017-11-29 VITALS
TEMPERATURE: 95.6 F | SYSTOLIC BLOOD PRESSURE: 126 MMHG | WEIGHT: 148.8 LBS | BODY MASS INDEX: 23.66 KG/M2 | DIASTOLIC BLOOD PRESSURE: 76 MMHG | HEART RATE: 52 BPM | OXYGEN SATURATION: 94 %

## 2017-11-29 DIAGNOSIS — R06.09 EXERTIONAL DYSPNEA: Primary | ICD-10-CM

## 2017-11-29 DIAGNOSIS — J44.9 CHRONIC OBSTRUCTIVE PULMONARY DISEASE, UNSPECIFIED COPD TYPE (H): ICD-10-CM

## 2017-11-29 LAB
FEF 25/75: 0.13
FEV-1: 0.35
FEV1/FVC: NORMAL
FVC: 1.68

## 2017-11-29 PROCEDURE — 99214 OFFICE O/P EST MOD 30 MIN: CPT | Mod: 25 | Performed by: FAMILY MEDICINE

## 2017-11-29 PROCEDURE — 94010 BREATHING CAPACITY TEST: CPT | Performed by: FAMILY MEDICINE

## 2017-11-29 RX ORDER — BUDESONIDE 0.5 MG/2ML
0.5 INHALANT ORAL 2 TIMES DAILY
Qty: 120 ML | Refills: 11 | Status: SHIPPED | OUTPATIENT
Start: 2017-11-29 | End: 2017-12-06

## 2017-11-29 NOTE — PATIENT INSTRUCTIONS
Stress Echo - Dec 5th @ 8:45 am at [facility]. Cardiology (2nd floor)        Chest CT  Dec 5th @ 10:45 am at  [facility] diagnostics        Thank you for choosing [facility].  You may be receiving a survey in the mail from [employer] regarding your visit today.  Please take a few minutes to complete and return the survey to let us know how we are doing.      Our Clinic hours are:  Mondays    7:20 am - 7 pm  Tues -  Fri  7:20 am - 5 pm    Clinic Phone: [phone]    The clinic lab opens at 7:30 am Mon - Fri and appointments are required.    [facility] [place]  Ph. [phone]  Monday-Thursday 8 am - 7pm  Tues/Wed/Fri 8 am - 5:30 pm

## 2017-11-29 NOTE — PROGRESS NOTES
SUBJECTIVE:   Chadwick Aguilar is a 72 year old male who presents to clinic today for the following health issues:      Chief Complaint   Patient presents with     COPD     has been put on nebulizer now, has trouble walking a long way     ADDITIONAL HPI: 72 year old male here for above issue.  He has a history of PAD and severe COPD. We switched from steroid inhaler to steroid neb and he has noticed some improvement. Still has significant exertional shortness of breath. Denies exertional CP. Had angiogram 1 year ago which showed mild CAD but not severe blockages. Doesn't like using neb but prefers it to inhaler. Does not want additional inhaled medications.     ROS: 10 point review of systems negative except as per HPI.    PAST MEDICAL HISTORY:  History reviewed. No pertinent past medical history.     ACTIVE MEDICAL PROBLEMS:  Patient Active Problem List   Diagnosis     PVD (peripheral vascular disease) (H)     Chronic obstructive pulmonary disease, unspecified COPD type (H)     Dementia without behavioral disturbance, unspecified dementia type     Claudication (H)     Arteriosclerotic vascular disease     Essential hypertension, benign     Hyperlipidemia LDL goal <70     Advanced directives, counseling/discussion        FAMILY HISTORY:  History reviewed. No pertinent family history.    SOCIAL HISTORY:  Social History     Social History     Marital status:      Spouse name: N/A     Number of children: N/A     Years of education: N/A     Occupational History     Not on file.     Social History Main Topics     Smoking status: Light Tobacco Smoker     Packs/day: 0.25     Years: 50.00     Types: Cigarettes     Last attempt to quit: 12/23/2016     Smokeless tobacco: Never Used      Comment: uses patches PRN     Alcohol use Yes      Comment: occassionally     Drug use: No     Sexual activity: Not on file     Other Topics Concern     Not on file     Social History Narrative       MEDICATIONS:  Current Outpatient  Prescriptions   Medication Sig Dispense Refill     budesonide (PULMICORT) 0.5 MG/2ML neb solution Take 2 mLs (0.5 mg) by nebulization 2 times daily 120 mL 11     order for DME Equipment being ordered: shower chair 1 each 0     metoprolol (TOPROL-XL) 100 MG 24 hr tablet Take 1 tablet (100 mg) by mouth daily 90 tablet 3     lisinopril (PRINIVIL/ZESTRIL) 40 MG tablet Take 1 tablet (40 mg) by mouth daily 90 tablet 3     simvastatin (ZOCOR) 5 MG tablet Take 1 tablet (5 mg) by mouth At Bedtime 90 tablet 3     citalopram (CELEXA) 20 MG tablet Take 1 tablet (20 mg) by mouth daily 90 tablet 3     memantine (NAMENDA) 10 MG tablet Take 1 tablet (10 mg) by mouth 2 times daily 180 tablet 3     clopidogrel (PLAVIX) 75 MG tablet Take 1 tablet (75 mg) by mouth daily 90 tablet 1     aspirin 81 MG tablet Take 81 mg by mouth daily       Thiamine HCl (VITAMIN B-1 PO) Take 250 mg by mouth daily        [DISCONTINUED] budesonide (PULMICORT) 0.5 MG/2ML neb solution Take 2 mLs (0.5 mg) by nebulization 2 times daily 120 mL 3       ALLERGIES:   No Known Allergies    Problem list, Medication list, Allergies, and Medical/Social/Surgical histories reviewed in [facility] and updated as appropriate.    OBJECTIVE:                                                    VITALS: /76 (Cuff Size: Adult Regular)  Pulse 52  Temp 95.6  F (35.3  C) (Tympanic)  Wt 148 lb 12.8 oz (67.5 kg)  SpO2 94%  BMI 23.66 kg/m2 Body mass index is 23.66 kg/(m^2).  GENERAL: Pleasant, well appearing male.  CV: RRR, no murmurs, rubs or gallops.  LUNGS: Decreased breath sounds throughout otherwise clear to auscultation bilaterally, normal effort.  SKIN: warm and dry without obvious rashes.   EXTREMITIES: No edema, normal pulses.       ASSESSMENT/PLAN:                                                    1. Chronic obstructive pulmonary disease, unspecified COPD type (H)  Discussed and would recommend adding Spiriva but he does not want to do this. We'll obtain spirometry to  see how much she is improved since last March when we started inhaled steroids. I think also really reasonable to check a lung CT scan to rule out other pulmonary etiology for dyspnea especially given his high risk for lung cancer..  - budesonide (PULMICORT) 0.5 MG/2ML neb solution; Take 2 mLs (0.5 mg) by nebulization 2 times daily  Dispense: 120 mL; Refill: 11  - Spirometry, Breathing Capacity: Normal Order, Clinic Performed  - order for DME; Equipment being ordered: shower chair  Dispense: 1 each; Refill: 0    2. Exertional dyspnea  Additional studies as below. Discussed likely multifactorial including COPD, deconditioning. Discussed possibility that there is a component of cardiac ischemia despite the fact that he is not having chest pain especially given history of PAD and atherosclerotic vascular disease. Angiogram from last year, however, showed relatively minimal coronary artery disease. Further work-up and management as dictated by results.   - Exercise Stress Echocardiogram; Future  - CT Chest w/o Contrast; Future  - Spirometry, Breathing Capacity: Normal Order, Clinic Performed  - order for DME; Equipment being ordered: shower chair  Dispense: 1 each; Refill: 0

## 2017-11-29 NOTE — NURSING NOTE
"Chief Complaint   Patient presents with     COPD     has been put on nebulizer now, has trouble walking a long way       Initial /76 (Cuff Size: Adult Regular)  Pulse 52  Temp 95.6  F (35.3  C) (Tympanic)  Wt 148 lb 12.8 oz (67.5 kg)  SpO2 94%  BMI 23.66 kg/m2 Estimated body mass index is 23.66 kg/(m^2) as calculated from the following:    Height as of 9/7/17: 5' 6.5\" (1.689 m).    Weight as of this encounter: 148 lb 12.8 oz (67.5 kg).  Medication Reconciliation: complete   Antonia Card CMA      "

## 2017-11-29 NOTE — MR AVS SNAPSHOT
After Visit Summary   11/29/2017    Chadwick Aguilar    MRN: [id number]           Patient Information     Date Of Birth          1945        Visit Information        Provider Department      11/29/2017 10:40 AM Jessi Hills MD [facility]        Today's Diagnoses     Exertional dyspnea    -  1    Chronic obstructive pulmonary disease, unspecified COPD type (H)          Care Instructions    Stress Echo - Dec 5th @ 8:45 am at [facility]. Cardiology (2nd floor)        Chest CT  Dec 5th @ 10:45 am at  [facility] diagnostics        Thank you for choosing [facility].  You may be receiving a survey in the mail from [employer] regarding your visit today.  Please take a few minutes to complete and return the survey to let us know how we are doing.      Our Clinic hours are:  Mondays    7:20 am - 7 pm  Tues -  Fri  7:20 am - 5 pm    Clinic Phone: [phone]    The clinic lab opens at 7:30 am Mon - Fri and appointments are required.    [facility]. [phone]  Monday-Thursday 8 am - 7pm  Tues/Wed/Fri 8 am - 5:30 pm                 Follow-ups after your visit        Your next 10 appointments already scheduled     Dec 05, 2017  8:45 AM CST   Ech Stress Test with JOAQUIN OVALLE ECHO STRESS 2   [facility] Echocardiography ([facility])    [address] [address]   [phone]           1. Please bring or wear a comfortable two-piece outfit and walking shoes. 2. Stop eating 3 hours before the test. You may drink water or juice. 3. Stop all caffeine 12 hours before the test. This includes coffee, tea, soda pop, chocolate and certain medicines (such as Anacin and Excederin). Also avoid decaf coffee and tea, as these contain small amounts of caffeine. 4. No alcohol, smoking or use of other tobacco products for 12 hours before the test. 5. Refer to your provider instructions to see if you need to stop  any medications (such as beta-blockers or nitrates) for this test. 6. For patients with diabetes: - If you take insulin, call your diabetes care team. Ask if you should take a   dose the morning of your test. - If you take diabetes medicine by mouth, dont take it on the morning of your test. Bring it with you to take after the test. (If you have questions, call your diabetes care team) 7. When you arrive, please tell us if: - You have diabetes. - You have taken Viagra, Cialis or Levitra in the past 48 hours. 8. For any questions that cannot be answered, please contact the ordering physician            Dec 05, 2017 10:45 AM CST   CT CHEST W/O CONTRAST with WYCT1   [facility] CT ([facility])    [address] [address] [phone]           Please bring any scans or X-rays taken at other hospitals, if similar tests were done. Also bring a list of your medicines, including vitamins, minerals and over-the-counter drugs. It is safest to leave personal items at home.  Be sure to tell your doctor:   If you have any allergies.   If there s any chance you are pregnant.   If you are breastfeeding.   If you have any special needs.  You do not need to do anything special to prepare.  Please wear loose clothing, such as a sweat suit or jogging clothes. Avoid snaps, zippers and other metal. We may ask you to undress and put on a hospital gown.              Future tests that were ordered for you today     Open Future Orders        Priority Expected Expires Ordered    Exercise Stress Echocardiogram Routine  11/29/2018 11/29/2017    CT Chest w/o Contrast Routine  11/29/2018 11/29/2017            Who to contact     If you have questions or need follow up information about today's clinic visit or your schedule please contact [facility] directly at [phone].  Normal or non-critical lab and imaging results will be communicated to you by MyChart, letter or phone within 4  "business days after the clinic has received the results. If you do not hear from us within 7 days, please contact the clinic through [employer] or phone. If you have a critical or abnormal lab result, we will notify you by phone as soon as possible.  Submit refill requests through [employer] or call your pharmacy and they will forward the refill request to us. Please allow 3 business days for your refill to be completed.          Additional Information About Your Visit        [employer] Information     [employer] lets you send messages to your doctor, view your test results, renew your prescriptions, schedule appointments and more. To sign up, go to [email or web address] . Click on \"Log in\" on the left side of the screen, which will take you to the Welcome page. Then click on \"Sign up Now\" on the right side of the page.     You will be asked to enter the access code listed below, as well as some personal information. Please follow the directions to create your username and password.     Your access code is: [email or web address]  Expires: 2018 11:28 AM     Your access code will  in 90 days. If you need help or a new code, please call your [place] clinic or [phone].        Care EveryWhere ID     This is your Care EveryWhere ID. This could be used by other organizations to access your [place] medical records  [id number]        Your Vitals Were     Pulse Temperature Pulse Oximetry BMI (Body Mass Index)          52 95.6  F (35.3  C) (Tympanic) 94% 23.66 kg/m2         Blood Pressure from Last 3 Encounters:   17 126/76   17 148/79   17 132/77    Weight from Last 3 Encounters:   17 148 lb 12.8 oz (67.5 kg)   17 146 lb 9.6 oz (66.5 kg)   17 145 lb 12.8 oz (66.1 kg)              We Performed the Following     Spirometry, Breathing Capacity: Normal Order, Clinic Performed          Today's Medication Changes          These changes are accurate as of: 17 11:53 AM.  If you have any " questions, ask your nurse or doctor.               Start taking these medicines.        Dose/Directions    order for DME   Used for:  Chronic obstructive pulmonary disease, unspecified COPD type (H), Exertional dyspnea   Started by:  Jessi Hills MD        Equipment being ordered: shower chair   Quantity:  1 each   Refills:  0            Where to get your medicines      These medications were sent to [facility] [place], [place] - [address]  [address] [facility] [address]     Phone:  [phone]     budesonide 0.5 MG/2ML neb solution         Some of these will need a paper prescription and others can be bought over the counter.  Ask your nurse if you have questions.     Bring a paper prescription for each of these medications     order for DME                Primary Care Provider Office Phone # Fax #    Jessi Hills [phone] [address] [facility] [address]        Equal Access to Services     RAM[facility]Z [facility] AH: Hadii aad ku hadasho Soomaali, waaxda luqadaha, qaybta kaalmada adeegyada, waxay idiin hayaan bridget kharadona carrillo . So [facility] [phone].    ATENCIÓN: Si habla español, tiene a tamez disposición servicios gratuitos de asistencia lingüística. Llame al [phone].    We comply with applicable federal civil rights laws and [place] laws. We do not discriminate on the basis of race, color, national origin, age, disability, sex, sexual orientation, or gender identity.            Thank you!     Thank you for choosing [facility]  for your care. Our goal is always to provide you with excellent care. Hearing back from our patients is one way we can continue to improve our services. Please take a few minutes to complete the written survey that you may receive in the mail after your visit with us. Thank you!             Your Updated Medication List - Protect others around you: Learn how to  safely use, store and throw away your medicines at www.disposemymeds.org.          This list is accurate as of: 11/29/17 11:53 AM.  Always use your most recent med list.                   Brand Name Dispense Instructions for use Diagnosis    aspirin 81 MG tablet      Take 81 mg by mouth daily        budesonide 0.5 MG/2ML neb solution    PULMICORT    120 mL    Take 2 mLs (0.5 mg) by nebulization 2 times daily    Chronic obstructive pulmonary disease, unspecified COPD type (H)       citalopram 20 MG tablet    celeXA    90 tablet    Take 1 tablet (20 mg) by mouth daily    Dementia without behavioral disturbance, unspecified dementia type       clopidogrel 75 MG tablet    PLAVIX    90 tablet    Take 1 tablet (75 mg) by mouth daily        lisinopril 40 MG tablet    PRINIVIL/ZESTRIL    90 tablet    Take 1 tablet (40 mg) by mouth daily    Essential hypertension, benign       memantine 10 MG tablet    NAMENDA    180 tablet    Take 1 tablet (10 mg) by mouth 2 times daily    Dementia without behavioral disturbance, unspecified dementia type       metoprolol 100 MG 24 hr tablet    TOPROL-XL    90 tablet    Take 1 tablet (100 mg) by mouth daily    Essential hypertension, benign       order for DME     1 each    Equipment being ordered: shower chair    Chronic obstructive pulmonary disease, unspecified COPD type (H), Exertional dyspnea       simvastatin 5 MG tablet    ZOCOR    90 tablet    Take 1 tablet (5 mg) by mouth At Bedtime    Arteriosclerotic vascular disease       VITAMIN B-1 PO      Take 250 mg by mouth daily

## 2017-12-05 ENCOUNTER — HOSPITAL ENCOUNTER (OUTPATIENT)
Dept: CT IMAGING | Facility: CLINIC | Age: 72
End: 2017-12-05
Attending: FAMILY MEDICINE
Payer: COMMERCIAL

## 2017-12-05 ENCOUNTER — TELEPHONE (OUTPATIENT)
Dept: FAMILY MEDICINE | Facility: CLINIC | Age: 72
End: 2017-12-05

## 2017-12-05 DIAGNOSIS — R06.09 EXERTIONAL DYSPNEA: ICD-10-CM

## 2017-12-05 PROCEDURE — 71250 CT THORAX DX C-: CPT

## 2017-12-05 NOTE — TELEPHONE ENCOUNTER
Pt with SOB arrived to have Stress Echo.  Pulse ox was 84-86% O2 after walking.  O2 @ 93% after sitting for awhile.  They canceled his test due to his condition.  Pt has Chest CT scheduled this AM.  Pt will have that done.  Scheduled clinic appt today @ 12:40 with  to assess breathing and need for O2.  KPavelRN

## 2017-12-05 NOTE — TELEPHONE ENCOUNTER
Spoke with wife and pts breathing is his usual, wants to see  tomorrow.  Appt scheduled.  KPavelRN

## 2017-12-06 ENCOUNTER — OFFICE VISIT (OUTPATIENT)
Dept: FAMILY MEDICINE | Facility: CLINIC | Age: 72
End: 2017-12-06
Payer: COMMERCIAL

## 2017-12-06 VITALS
TEMPERATURE: 95 F | WEIGHT: 148.2 LBS | SYSTOLIC BLOOD PRESSURE: 149 MMHG | HEART RATE: 61 BPM | DIASTOLIC BLOOD PRESSURE: 84 MMHG | OXYGEN SATURATION: 93 % | BODY MASS INDEX: 23.56 KG/M2

## 2017-12-06 DIAGNOSIS — R06.09 EXERTIONAL DYSPNEA: ICD-10-CM

## 2017-12-06 DIAGNOSIS — J44.9 CHRONIC OBSTRUCTIVE PULMONARY DISEASE, UNSPECIFIED COPD TYPE (H): Primary | ICD-10-CM

## 2017-12-06 PROCEDURE — 99214 OFFICE O/P EST MOD 30 MIN: CPT | Performed by: FAMILY MEDICINE

## 2017-12-06 RX ORDER — TIOTROPIUM BROMIDE 18 UG/1
18 CAPSULE ORAL; RESPIRATORY (INHALATION) DAILY
Qty: 90 CAPSULE | Refills: 3 | Status: SHIPPED | OUTPATIENT
Start: 2017-12-06 | End: 2018-08-10

## 2017-12-06 RX ORDER — ALBUTEROL SULFATE 0.83 MG/ML
1 SOLUTION RESPIRATORY (INHALATION) EVERY 6 HOURS PRN
Qty: 25 VIAL | Refills: 11 | Status: SHIPPED | OUTPATIENT
Start: 2017-12-06 | End: 2020-01-01

## 2017-12-06 NOTE — PATIENT INSTRUCTIONS
Thank you for choosing [facility].  You may be receiving a survey in the mail from [facility] regarding your visit today.  Please take a few minutes to complete and return the survey to let us know how we are doing.      Our Clinic hours are:  Mondays    7:20 am - 7 pm  Tues -  Fri  7:20 am - 5 pm    Clinic Phone: [phone]    The clinic lab opens at 7:30 am Mon - Fri and appointments are required.    [facility]. [phone]  Monday-Thursday 8 am - 7pm  Tues/Wed/Fri 8 am - 5:30 pm

## 2017-12-06 NOTE — LETTER
My COPD Action Plan     Name: Chadwick Aguilar    YOB: 1945   Date: 12/6/2017    My doctor: Jessi Hills MD   [address] [facility] [address]  [phone]  My Controller Medicine: Serevent/Fluticasone (Advair)  250/50 1 inhalation twice daily.  And   Tiotropium (Spiriva) 1 inhalation once daily     My Rescue Medicine: Albuterol nebulizer solution  1 vial nebulized up to every 4-6 hours as needed for shortness of breath    FEV-1 (no units)   Date Value   11/29/2017 0.35     FEV1/FVC (no units)   Date Value   11/29/2017 21%      My COPD Severity: Very Severe = FeV1 < 30 %      Use of Oxygen: 2L Liters as needed     Make sure you've had your pneumonia   vaccines.          GREEN ZONE       Doing well today      Usual level of activity and exercise    Usual amount of cough and mucus    No shortness of breath    Usual level of health (thinking clearly, sleeping well, feel like eating) Actions:      Take daily medicines    Use oxygen as prescribed    Follow regular exercise and diet plan    Avoid cigarette smoke and other irritants that harm the lungs           YELLOW ZONE          Having a bad day or flare up      Short of breath more than usual    A lot more sputum (mucus) than usual    Sputum looks yellow, green, tan, brown or bloody    More coughing or wheezing    Fever or chills    Less energy; trouble completing activities    Trouble thinking or focusing    Using quick relief inhaler or nebulizer more often    Poor sleep; symptoms wake me up    Do not feel like eating Actions:      Get plenty of rest    Take daily medicines    Use quick relief inhaler every 4-6 hours    If you use oxygen, call you doctor to see if you should adjust your oxygen    Do breathing exercises or other things to help you relax    Let a loved one, friend or neighbor know you are feeling worse    Call your care team if you have 2 or more symptoms.  Start  taking steroids or antibiotics if directed by your care team           RED ZONE       Need medical care now      Severe shortness of breath (feel you can't breathe)    Fever, chills    Not enough breath to do any activity    Trouble coughing up mucus, walking or talking    Blood in mucus    Frequent coughing   Rescue medicines are not working    Not able to sleep because of breathing    Feel confused or drowsy    Chest pain    Actions:      Call your health care team.  If you cannot reach your care team, call 911 or go to the emergency room.        Electronically signed by: Jessi Hills, December 6, 2017  Annual Reminders:  Meet with Care Team, Flu Shot every Fall  Pharmacy: Data Unavailable

## 2017-12-06 NOTE — LETTER
My COPD Action Plan     Name: Chadwick Aguilar    YOB: 1945   Date: 12/6/2017    My doctor: Jessi Hills MD   [address] [facility] [address]  [phone]  My Controller Medicine: Serevent/Fluticasone (Advair)   Dose: 250/50 1 inhalation twice daily.     My Rescue Medicine: Albuterol nebulizer solution    Dose: 1 vial nebulized up to every 4-6 hours as needed for shortness of breath    FEV-1 (no units)   Date Value   11/29/2017 0.35     FEV1/FVC (no units)   Date Value   11/29/2017 21%      My COPD Severity: Very Severe = FeV1 < 30 %      Use of Oxygen: 2L Liters as needed     Make sure you've had your pneumonia   vaccines.          GREEN ZONE       Doing well today      Usual level of activity and exercise    Usual amount of cough and mucus    No shortness of breath    Usual level of health (thinking clearly, sleeping well, feel like eating) Actions:      Take daily medicines    Use oxygen as prescribed    Follow regular exercise and diet plan    Avoid cigarette smoke and other irritants that harm the lungs           YELLOW ZONE          Having a bad day or flare up      Short of breath more than usual    A lot more sputum (mucus) than usual    Sputum looks yellow, green, tan, brown or bloody    More coughing or wheezing    Fever or chills    Less energy; trouble completing activities    Trouble thinking or focusing    Using quick relief inhaler or nebulizer more often    Poor sleep; symptoms wake me up    Do not feel like eating Actions:      Get plenty of rest    Take daily medicines    Use quick relief inhaler every 4-6 hours    If you use oxygen, call you doctor to see if you should adjust your oxygen    Do breathing exercises or other things to help you relax    Let a loved one, friend or neighbor know you are feeling worse    Call your care team if you have 2 or more symptoms.  Start taking steroids or antibiotics if  directed by your care team           RED ZONE       Need medical care now      Severe shortness of breath (feel you can't breathe)    Fever, chills    Not enough breath to do any activity    Trouble coughing up mucus, walking or talking    Blood in mucus    Frequent coughing   Rescue medicines are not working    Not able to sleep because of breathing    Feel confused or drowsy    Chest pain    Actions:      Call your health care team.  If you cannot reach your care team, call 911 or go to the emergency room.        Electronically signed by: Jessi Hills, December 6, 2017  Annual Reminders:  Meet with Care Team, Flu Shot every Fall  Pharmacy: Data Unavailable

## 2017-12-06 NOTE — MR AVS SNAPSHOT
After Visit Summary   12/6/2017    Chadwick Aguilar    MRN: [id number]           Patient Information     Date Of Birth          1945        Visit Information        Provider Department      12/6/2017 10:00 AM Jessi Hills MD [facility]        Today's Diagnoses     Chronic obstructive pulmonary disease, unspecified COPD type (H)    -  1    Exertional dyspnea          Care Instructions          Thank you for choosing [facility].  You may be receiving a survey in the mail from [facility]@ regarding your visit today.  Please take a few minutes to complete and return the survey to let us know how we are doing.      Our Clinic hours are:  Mondays    7:20 am - 7 pm  Tues -  Fri  7:20 am - 5 pm    Clinic Phone: [phone]    The clinic lab opens at 7:30 am Mon - Fri and appointments are required.    [facility]  Ph. [phone]  Monday-Thursday 8 am - 7pm  Tues/Wed/Fri 8 am - 5:30 pm                 Follow-ups after your visit        Future tests that were ordered for you today     Open Future Orders        Priority Expected Expires Ordered    NM Lexiscan stress test Routine  12/6/2018 12/6/2017            Who to contact     If you have questions or need follow up information about today's clinic visit or your schedule please contact [facility] directly at [phone].  Normal or non-critical lab and imaging results will be communicated to you by MyChart, letter or phone within 4 business days after the clinic has received the results. If you do not hear from us within 7 days, please contact the clinic through MyChart or phone. If you have a critical or abnormal lab result, we will notify you by phone as soon as possible.  Submit refill requests through [employer] or call your pharmacy and they will forward the refill request to us. Please allow 3 business days for your refill to be completed.          Additional Information  "About Your Visit        [employer] Information     [employer] lets you send messages to your doctor, view your test results, renew your prescriptions, schedule appointments and more. To sign up, go to [email or web address] . Click on \"Log in\" on the left side of the screen, which will take you to the Welcome page. Then click on \"Sign up Now\" on the right side of the page.     You will be asked to enter the access code listed below, as well as some personal information. Please follow the directions to create your username and password.     Your access code is: [email or web address]  Expires: 2018 11:28 AM     Your access code will  in 90 days. If you need help or a new code, please call your [place] clinic or [phone].        Care EveryWhere ID     This is your Care EveryWhere ID. This could be used by other organizations to access your [place] medical records  [id number]        Your Vitals Were     Pulse Temperature Pulse Oximetry BMI (Body Mass Index)          61 95  F (35  C) (Tympanic) 93% 23.56 kg/m2         Blood Pressure from Last 3 Encounters:   17 149/84   17 126/76   17 148/79    Weight from Last 3 Encounters:   17 148 lb 3.2 oz (67.2 kg)   17 148 lb 12.8 oz (67.5 kg)   17 146 lb 9.6 oz (66.5 kg)                 Today's Medication Changes          These changes are accurate as of: 17  2:13 PM.  If you have any questions, ask your nurse or doctor.               Start taking these medicines.        Dose/Directions    albuterol (2.5 MG/3ML) 0.083% neb solution   Used for:  Chronic obstructive pulmonary disease, unspecified COPD type (H)   Started by:  Jessi Hills MD        Dose:  1 vial   Take 1 vial (2.5 mg) by nebulization every 6 hours as needed for shortness of breath / dyspnea or wheezing   Quantity:  25 vial   Refills:  11       fluticasone-salmeterol 250-50 MCG/DOSE diskus inhaler   Commonly known as:  ADVAIR DISKUS   Used for:  Chronic " obstructive pulmonary disease, unspecified COPD type (H)   Started by:  Jessi Hills MD        Dose:  1 puff   Inhale 1 puff into the lungs 2 times daily   Quantity:  60 Inhaler   Refills:  11       tiotropium 18 MCG capsule   Commonly known as:  SPIRIVA HANDIHALER   Used for:  Chronic obstructive pulmonary disease, unspecified COPD type (H)   Started by:  Jessi Hills MD        Dose:  18 mcg   Inhale 1 capsule (18 mcg) into the lungs daily Inhale contents of one capsule daily.   Quantity:  90 capsule   Refills:  3         These medicines have changed or have updated prescriptions.        Dose/Directions    * order for DME   This may have changed:  Another medication with the same name was added. Make sure you understand how and when to take each.   Used for:  Chronic obstructive pulmonary disease, unspecified COPD type (H), Exertional dyspnea   Changed by:  Jessi Hills MD        Equipment being ordered: shower chair   Quantity:  1 each   Refills:  0       * order for DME   This may have changed:  You were already taking a medication with the same name, and this prescription was added. Make sure you understand how and when to take each.   Used for:  Chronic obstructive pulmonary disease, unspecified COPD type (H)   Changed by:  Jessi Hills MD        Equipment being ordered: Portable oxygen concentrator with O2 @ 2L NC PRN shortnes of breath.   Quantity:  1 each   Refills:  0       * Notice:  This list has 2 medication(s) that are the same as other medications prescribed for you. Read the directions carefully, and ask your doctor or other care provider to review them with you.      Stop taking these medicines if you haven't already. Please contact your care team if you have questions.     budesonide 0.5 MG/2ML neb solution   Commonly known as:  PULMICORT   Stopped by:  Jessi Hills MD                Where to get your medicines      These medications  [place] [facility] [place] [facility] [address] [address]  [address] [facility] [address]     Phone:  [phone]     albuterol (2.5 MG/3ML) 0.083% neb solution    fluticasone-salmeterol 250-50 MCG/DOSE diskus inhaler    tiotropium 18 MCG capsule         Some of these will need a paper prescription and others can be bought over the counter.  Ask your nurse if you have questions.     Bring a paper prescription for each of these medications     order for DME                Primary Care Provider Office Phone # Fax #    Elissalatia Danni Hills [phone] [address] [facility] [place] [address]        Equal Access to Services     [facility] : Hadii lisa crawfordo Soarely, waaxda luqadaha, qaybta kaalmada adejackiyaalanis, chloe carrillo . So [facility] [phone].    ATENCIÓN: Si habla español, tiene a tamez disposición servicios gratuitos de asistencia lingüística. Kwame al [phone].    We comply with applicable federal civil rights laws and [place] laws. We do not discriminate on the basis of race, color, national origin, age, disability, sex, sexual orientation, or gender identity.            Thank you!     Thank you for choosing [facility]note 2[facility]  for your care. Our goal is always to provide you with excellent care. Hearing back from our patients is one way we can continue to improve our services. Please take a few minutes to complete the written survey that you may receive in the mail after your visit with us. Thank you!             Your Updated Medication List - Protect others around you: Learn how to safely use, store and throw away your medicines at www.disposemymeds.org.          This list is accurate as of: 12/6/17  2:13 PM.  Always use your most recent med list.                   Brand Name Dispense Instructions for use Diagnosis    albuterol (2.5 MG/3ML) 0.083% neb solution     25 vial    Take 1 vial  (2.5 mg) by nebulization every 6 hours as needed for shortness of breath / dyspnea or wheezing    Chronic obstructive pulmonary disease, unspecified COPD type (H)       aspirin 81 MG tablet      Take 81 mg by mouth daily        citalopram 20 MG tablet    celeXA    90 tablet    Take 1 tablet (20 mg) by mouth daily    Dementia without behavioral disturbance, unspecified dementia type       clopidogrel 75 MG tablet    PLAVIX    90 tablet    Take 1 tablet (75 mg) by mouth daily        fluticasone-salmeterol 250-50 MCG/DOSE diskus inhaler    ADVAIR DISKUS    60 Inhaler    Inhale 1 puff into the lungs 2 times daily    Chronic obstructive pulmonary disease, unspecified COPD type (H)       lisinopril 40 MG tablet    PRINIVIL/ZESTRIL    90 tablet    Take 1 tablet (40 mg) by mouth daily    Essential hypertension, benign       memantine 10 MG tablet    NAMENDA    180 tablet    Take 1 tablet (10 mg) by mouth 2 times daily    Dementia without behavioral disturbance, unspecified dementia type       metoprolol 100 MG 24 hr tablet    TOPROL-XL    90 tablet    Take 1 tablet (100 mg) by mouth daily    Essential hypertension, benign       * order for DME     1 each    Equipment being ordered: shower chair    Chronic obstructive pulmonary disease, unspecified COPD type (H), Exertional dyspnea       * order for DME     1 each    Equipment being ordered: Portable oxygen concentrator with O2 @ 2L NC PRN shortnes of breath.    Chronic obstructive pulmonary disease, unspecified COPD type (H)       simvastatin 5 MG tablet    ZOCOR    90 tablet    Take 1 tablet (5 mg) by mouth At Bedtime    Arteriosclerotic vascular disease       tiotropium 18 MCG capsule    SPIRIVA HANDIHALER    90 capsule    Inhale 1 capsule (18 mcg) into the lungs daily Inhale contents of one capsule daily.    Chronic obstructive pulmonary disease, unspecified COPD type (H)       VITAMIN B-1 PO      Take 250 mg by mouth daily        * Notice:  This list has 2 medication(s)  that are the same as other medications prescribed for you. Read the directions carefully, and ask your doctor or other care provider to review them with you.

## 2017-12-06 NOTE — PROGRESS NOTES
SUBJECTIVE:   Chadwick Aguilar is a 72 year old male who presents to clinic today for the following health issues:      Chief Complaint   Patient presents with     Shortness of Breath     was unable to do the stress test, they were worried about his shortness of breath     ADDITIONAL HPI: 72 year old male here for above issue. He has a history of severe COPD but has been very resistant to any inhaled medications. Is increasingly short of breath with activity. Was scheduled for exercise stress test as he felt he'd be able to exercise as required for this. On the day of testing he was feeling more short of breath than usual and used a wheelchair to get to the stress test lab. They, rightfully, canceled this and advised follow-up. I think we'll proceed with lexiscan instead.    He also has a CT which showed significant COPD changes and bullae.    ROS: 10 point review of systems negative except as per HPI.    PAST MEDICAL HISTORY:  History reviewed. No pertinent past medical history.     ACTIVE MEDICAL PROBLEMS:  Patient Active Problem List   Diagnosis     PVD (peripheral vascular disease) (H)     Chronic obstructive pulmonary disease, unspecified COPD type (H)     Dementia without behavioral disturbance, unspecified dementia type     Claudication (H)     Arteriosclerotic vascular disease     Essential hypertension, benign     Hyperlipidemia LDL goal <70     Advanced directives, counseling/discussion        FAMILY HISTORY:  History reviewed. No pertinent family history.    SOCIAL HISTORY:  Social History     Social History     Marital status:      Spouse name: N/A     Number of children: N/A     Years of education: N/A     Occupational History     Not on file.     Social History Main Topics     Smoking status: Light Tobacco Smoker     Years: 50.00     Types: Cigarettes     Last attempt to quit: 12/23/2016     Smokeless tobacco: Never Used      Comment: uses patches PRN ? 1 a day occassionally     Alcohol use  Yes      Comment: occassionally     Drug use: No     Sexual activity: Not on file     Other Topics Concern     Not on file     Social History Narrative       MEDICATIONS:  Current Outpatient Prescriptions   Medication Sig Dispense Refill     budesonide (PULMICORT) 0.5 MG/2ML neb solution Take 2 mLs (0.5 mg) by nebulization 2 times daily 120 mL 11     order for DME Equipment being ordered: shower chair 1 each 0     metoprolol (TOPROL-XL) 100 MG 24 hr tablet Take 1 tablet (100 mg) by mouth daily 90 tablet 3     lisinopril (PRINIVIL/ZESTRIL) 40 MG tablet Take 1 tablet (40 mg) by mouth daily 90 tablet 3     simvastatin (ZOCOR) 5 MG tablet Take 1 tablet (5 mg) by mouth At Bedtime 90 tablet 3     citalopram (CELEXA) 20 MG tablet Take 1 tablet (20 mg) by mouth daily 90 tablet 3     memantine (NAMENDA) 10 MG tablet Take 1 tablet (10 mg) by mouth 2 times daily 180 tablet 3     clopidogrel (PLAVIX) 75 MG tablet Take 1 tablet (75 mg) by mouth daily 90 tablet 1     aspirin 81 MG tablet Take 81 mg by mouth daily       Thiamine HCl (VITAMIN B-1 PO) Take 250 mg by mouth daily          ALLERGIES:   No Known Allergies    Problem list, Medication list, Allergies, and Medical/Social/Surgical histories reviewed in [facility] and updated as appropriate.    OBJECTIVE:                                                    VITALS: /84 (Cuff Size: Adult Regular)  Pulse 61  Temp 95  F (35  C) (Tympanic)  Wt 148 lb 3.2 oz (67.2 kg)  SpO2 93%  BMI 23.56 kg/m2 Body mass index is 23.56 kg/(m^2).  GENERAL: Pleasant, well appearing male.      O2 sats at rest without oxygen: 93%  O2 sats with activity without oxygen: 86-89%  O2 sats at rest with oxygen 95%  O2 sats with activity with oxygen 93%      ASSESSMENT/PLAN:                                                    1. Chronic obstructive pulmonary disease, unspecified COPD type (H)  Severe. Uncontrolled. D/c Pulmicort start Advair and Spiriva. Start home Oxygen 2L NC. Albuterol nebs PRN.    Discussed risks of spontaneous pneumo with bullae. Advised if sudden short of breath/cp he should call 911.  - tiotropium (SPIRIVA HANDIHALER) 18 MCG capsule; Inhale 1 capsule (18 mcg) into the lungs daily Inhale contents of one capsule daily.  Dispense: 90 capsule; Refill: 3  - order for DME; Equipment being ordered: Portable oxygen concentrator with O2 @ 2L NC PRN shortnes of breath.  Dispense: 1 each; Refill: 0  - fluticasone-salmeterol (ADVAIR DISKUS) 250-50 MCG/DOSE diskus inhaler; Inhale 1 puff into the lungs 2 times daily  Dispense: 60 Inhaler; Refill: 11  - albuterol (2.5 MG/3ML) 0.083% neb solution; Take 1 vial (2.5 mg) by nebulization every 6 hours as needed for shortness of breath / dyspnea or wheezing  Dispense: 25 vial; Refill: 11    2. Exertional dyspnea  - NM Lexiscan stress test; Future

## 2017-12-06 NOTE — NURSING NOTE
"Chief Complaint   Patient presents with     Shortness of Breath     was unable to do the stress test, they were worried about his shortness of breath       Initial /84 (Cuff Size: Adult Regular)  Pulse 61  Temp 95  F (35  C) (Tympanic)  Wt 148 lb 3.2 oz (67.2 kg)  SpO2 93%  BMI 23.56 kg/m2 Estimated body mass index is 23.56 kg/(m^2) as calculated from the following:    Height as of 9/7/17: 5' 6.5\" (1.689 m).    Weight as of this encounter: 148 lb 3.2 oz (67.2 kg).  Medication Reconciliation: complete   Antonia Card CMA      "

## 2017-12-18 ENCOUNTER — HOSPITAL ENCOUNTER (OUTPATIENT)
Dept: NUCLEAR MEDICINE | Facility: CLINIC | Age: 72
Setting detail: NUCLEAR MEDICINE
Discharge: HOME | End: 2017-12-18
Attending: FAMILY MEDICINE | Admitting: FAMILY MEDICINE
Payer: COMMERCIAL

## 2017-12-18 DIAGNOSIS — R06.09 EXERTIONAL DYSPNEA: ICD-10-CM

## 2017-12-18 PROCEDURE — 93018 CV STRESS TEST I&R ONLY: CPT | Performed by: INTERNAL MEDICINE

## 2017-12-18 PROCEDURE — 93016 CV STRESS TEST SUPVJ ONLY: CPT | Performed by: INTERNAL MEDICINE

## 2017-12-18 PROCEDURE — 78452 HT MUSCLE IMAGE SPECT MULT: CPT | Mod: 26 | Performed by: INTERNAL MEDICINE

## 2017-12-18 PROCEDURE — 78452 HT MUSCLE IMAGE SPECT MULT: CPT

## 2017-12-18 PROCEDURE — A9502 TC99M TETROFOSMIN: HCPCS | Performed by: INTERNAL MEDICINE

## 2017-12-18 PROCEDURE — 93017 CV STRESS TEST TRACING ONLY: CPT

## 2017-12-18 PROCEDURE — 25000128 H RX IP 250 OP 636: Performed by: FAMILY MEDICINE

## 2017-12-18 PROCEDURE — 34300033 ZZH RX 343: Performed by: INTERNAL MEDICINE

## 2017-12-18 RX ORDER — REGADENOSON 0.08 MG/ML
0.4 INJECTION, SOLUTION INTRAVENOUS ONCE
Status: COMPLETED | OUTPATIENT
Start: 2017-12-18 | End: 2017-12-18

## 2017-12-18 RX ADMIN — TETROFOSMIN 10.4 MCI.: 1.38 INJECTION, POWDER, LYOPHILIZED, FOR SOLUTION INTRAVENOUS at 12:50

## 2017-12-18 RX ADMIN — REGADENOSON 0.4 MG: 0.08 INJECTION, SOLUTION INTRAVENOUS at 14:25

## 2017-12-18 RX ADMIN — TETROFOSMIN 31.6 MCI.: 1.38 INJECTION, POWDER, LYOPHILIZED, FOR SOLUTION INTRAVENOUS at 14:30

## 2017-12-21 ENCOUNTER — TELEPHONE (OUTPATIENT)
Dept: FAMILY MEDICINE | Facility: CLINIC | Age: 72
End: 2017-12-21

## 2017-12-21 DIAGNOSIS — R93.1 ABNORMAL NUCLEAR CARDIAC IMAGING TEST: Primary | ICD-10-CM

## 2017-12-21 RX ORDER — NITROGLYCERIN 0.4 MG/1
TABLET SUBLINGUAL
Qty: 25 TABLET | Refills: 0 | Status: SHIPPED | OUTPATIENT
Start: 2017-12-21 | End: 2018-06-26

## 2017-12-21 RX ORDER — ASPIRIN 81 MG/1
81 TABLET, CHEWABLE ORAL DAILY
Qty: 108 TABLET | Refills: 3 | Status: SHIPPED | OUTPATIENT
Start: 2017-12-21 | End: 2020-01-01

## 2017-12-21 NOTE — TELEPHONE ENCOUNTER
Please call the patient with the results. Notify that there is a small area of his heart that is not getting good blood flow.  I would recommend nitroglycerin if he gets chest pain.  We will also arrange for short interval cardiology follow-up to discuss management options.  If he develops any substernal chest pressure/pain that does not relieve with nitroglycerin or continues to recurr after 3 doses of nitroglycerin he needs to call 911.  Prescription for nitroglycerin faxed to the pharmacy.  I would also recommend that he start a daily 81 mg aspirin.

## 2017-12-21 NOTE — PROGRESS NOTES
See phone note: Please call the patient with the results. Notify that there is a small area of his heart that is not getting good blood flow.  I would recommend nitroglycerin if he gets chest pain.  We will also arrange for short interval cardiology follow-up to discuss management options.  If he develops any substernal chest pressure/pain that does not relieve with nitroglycerin or continues to recurr and 2 doses of nitroglycerin he needs to call 911.

## 2017-12-22 NOTE — TELEPHONE ENCOUNTER
I didn't see NTG on his active medication list. Make sure that his current bottle is not .  Aspirin was also not on his active medication list so I added that back.

## 2017-12-22 NOTE — TELEPHONE ENCOUNTER
FYI: Spouse was informed of the information below.  Spouse states he has the nitro tablets.  Patient has had the nitro tablets for 25 years. Patient has not had to use them since 2001.  Patient also has been taking aspirin 81 mg for years.  Spouse will call to schedule with cardiology.    Thank you  Lavonne BUSBY RN

## 2017-12-22 NOTE — TELEPHONE ENCOUNTER
Writer did discuss with the spouse with checking the bottle for expiration dates.  Spouse understood and will check.    Lavonne BUSBY RN

## 2018-01-02 ENCOUNTER — TELEPHONE (OUTPATIENT)
Dept: FAMILY MEDICINE | Facility: CLINIC | Age: 73
End: 2018-01-02

## 2018-01-10 ENCOUNTER — MEDICAL CORRESPONDENCE (OUTPATIENT)
Dept: HEALTH INFORMATION MANAGEMENT | Facility: CLINIC | Age: 73
End: 2018-01-10

## 2018-01-10 NOTE — TELEPHONE ENCOUNTER
Faxed completed form back to [facility] - Copy to scanning.    
Filled out and put on Dr. JOSH Hills's desk to sign  Antonia Card CMA    
Reason for Call:  Form, our goal is to have forms completed with 72 hours, however, some forms may require a visit or additional information.    Type of letter, form or note:  medical    Who is the form from?: [place] (if other please explain)    Where did the form come from: form was faxed in    What clinic location was the form placed at?: [facility]    Where the form was placed: Form Bin    What number is listed as a contact on the form?: [phone]       Additional comments:     Call taken on 1/2/2018 at 11:19 AM by Lucy Moreira        
Angina pectoris without myocardial infarction

## 2018-01-23 ENCOUNTER — OFFICE VISIT (OUTPATIENT)
Dept: CARDIOLOGY | Facility: CLINIC | Age: 73
End: 2018-01-23
Attending: FAMILY MEDICINE
Payer: COMMERCIAL

## 2018-01-23 VITALS
HEART RATE: 68 BPM | OXYGEN SATURATION: 95 % | SYSTOLIC BLOOD PRESSURE: 135 MMHG | BODY MASS INDEX: 24.96 KG/M2 | DIASTOLIC BLOOD PRESSURE: 78 MMHG | WEIGHT: 157 LBS

## 2018-01-23 DIAGNOSIS — R94.39 ABNORMAL CARDIOVASCULAR STRESS TEST: ICD-10-CM

## 2018-01-23 DIAGNOSIS — I73.9 PVD (PERIPHERAL VASCULAR DISEASE) (H): Primary | ICD-10-CM

## 2018-01-23 PROCEDURE — 99204 OFFICE O/P NEW MOD 45 MIN: CPT | Performed by: INTERNAL MEDICINE

## 2018-01-23 NOTE — LETTER
1/23/2018    Jessi Hills MD  [address]  [facility] [address]    RE: Chadwick Aguilar       Dear Colleague,    I had the pleasure of seeing Chadwick Aguilar in the [facility].    HPI and Plan:   Mr. Chadwick Aguilar was seen in consultation at [facility] at [facility]. He is 72 years old and was accompanied by his wife.    He was referred by Dr. Hills. He has significant chronic obstructive pulmonary disease and was becoming increasingly dyspneic. Mr. Aguilar does not remember why the stress study was ordered but notes that with the initiation of supplemental oxygen and bronchodilators, his breathing has improved markedly. He denies any chest, neck, arm or back discomfort.    He underwent a Lexiscan stress nuclear study which demonstrated a reversible mid and distal inferior defect. One year ago, he was seen in [facility] for worsening dyspnea. A chest x-ray demonstrated a pneumonia. There was also concern that he was experiencing and ST elevation myocardial infarct. A helicopter transport was arranged At [facility]. He underwent coronary angiography which demonstrated a 10% stenosis in the LAD and a 40% stenosis in the right coronary artery. No further cardiology follow-up was recommended. He has demonstrated normal left ventricular systolic performance incised over 30 years ago, he was told that he could have cardiomegaly.    Since that time, he and his wife have moved one [facility] area where they are surrounded by family. Mrs. Aguilar noticed that his breathing improved markedly with the oxygen and bronchodilators.    On physical examination, this is a man in no apparent distress. He was alert and oriented to person place and time. The blood pressure was 135/78 mmHg, heart rate 66 beats per minute and regular respiratory rate is approximately 16-20/m. The chest revealed a diffuse decreased  breath sounds with bilateral low frequency expiratory wheezing. Cardiac auscultation, there is S1 and S2 without extra sounds or murmur.    In assessment, Mr. Martin is completely asymptomatic without any anginal symptoms. He continues to carry nitroglycerin. He is on aspirin, statin therapy and beta blockade as well as ace inhibition.    In the absence of symptoms, I have not recommended coronary angiography. I discussed this extensively with  and Mrs. Aguilar. Neither is anxious to proceed with coronary angiography and as a matter fact, Mrs. Aguilar did not think Mr. Aguilar would survive angiography. I reassured her that is not the case and he would likely tolerate angiography and stent placement very well. I explained that there is not currently an indication.    I further explained that stent placement does not prevent myocardial infarcts. I explained that medical therapy is most beneficial. If his shortness of breath had not improved,, it would not be unreasonable to proceed with coronary angiography R [place] but his shortness of breath has improved markedly.    He continues to smoke 2-3 cigarettes daily. I again emphasized that complete cessation would lower his risk to the greater greatest extent. I also asked him to contact us if he develops any symptoms. I will follow-up with him in about 4 months. I would repeat a Lexiscan stress nuclear study at one year.      Orders Placed This Encounter   Procedures     Follow-Up with Cardiologist       Orders Placed This Encounter   Medications     Multiple Vitamins-Minerals (MULTIVITAMIN ADULT PO)       Medications Discontinued During This Encounter   Medication Reason     aspirin 81 MG tablet          Encounter Diagnoses   Name Primary?     PVD (peripheral vascular disease) (H) Yes     Abnormal cardiovascular stress test        CURRENT MEDICATIONS:  Current Outpatient Prescriptions   Medication Sig Dispense Refill     Multiple Vitamins-Minerals  (MULTIVITAMIN ADULT PO)        nitroGLYcerin (NITROSTAT) 0.4 MG sublingual tablet For chest pain place 1 tablet under the tongue every 5 minutes for 3 doses. If symptoms persist 5 minutes after 1st dose call 911. 25 tablet 0     aspirin 81 MG chewable tablet Take 1 tablet (81 mg) by mouth daily 108 tablet 3     tiotropium (SPIRIVA HANDIHALER) 18 MCG capsule Inhale 1 capsule (18 mcg) into the lungs daily Inhale contents of one capsule daily. 90 capsule 3     order for DME Equipment being ordered: Portable oxygen concentrator with O2 @ 2L NC PRN shortnes of breath. 1 each 0     fluticasone-salmeterol (ADVAIR DISKUS) 250-50 MCG/DOSE diskus inhaler Inhale 1 puff into the lungs 2 times daily 60 Inhaler 11     albuterol (2.5 MG/3ML) 0.083% neb solution Take 1 vial (2.5 mg) by nebulization every 6 hours as needed for shortness of breath / dyspnea or wheezing 25 vial 11     order for DME Equipment being ordered: shower chair 1 each 0     metoprolol (TOPROL-XL) 100 MG 24 hr tablet Take 1 tablet (100 mg) by mouth daily 90 tablet 3     lisinopril (PRINIVIL/ZESTRIL) 40 MG tablet Take 1 tablet (40 mg) by mouth daily 90 tablet 3     simvastatin (ZOCOR) 5 MG tablet Take 1 tablet (5 mg) by mouth At Bedtime 90 tablet 3     citalopram (CELEXA) 20 MG tablet Take 1 tablet (20 mg) by mouth daily 90 tablet 3     memantine (NAMENDA) 10 MG tablet Take 1 tablet (10 mg) by mouth 2 times daily 180 tablet 3     clopidogrel (PLAVIX) 75 MG tablet Take 1 tablet (75 mg) by mouth daily 90 tablet 1     Thiamine HCl (VITAMIN B-1 PO) Take 250 mg by mouth daily          ALLERGIES   No Known Allergies    PAST MEDICAL HISTORY:  No past medical history on file.    PAST SURGICAL HISTORY:  No past surgical history on file.    FAMILY HISTORY:  No family history on file.    SOCIAL HISTORY:  Social History     Social History     Marital status:      Spouse name: N/A     Number of children: N/A     Years of education: N/A     Social History Main Topics      Smoking status: Light Tobacco Smoker     Years: 50.00     Types: Cigarettes     Last attempt to quit: 12/23/2016     Smokeless tobacco: Never Used      Comment: uses patches PRN ? 1 a day occassionally     Alcohol use Yes      Comment: occassionally     Drug use: No     Sexual activity: Not Asked     Other Topics Concern     None     Social History Narrative       Review of Systems:  Skin:  Negative       Eyes:  Positive for glasses    ENT:  Negative      Respiratory:  Positive for shortness of breath COPD   Cardiovascular:    palpitations;Positive for;edema;fatigue    Gastroenterology: Negative      Genitourinary:  Negative      Musculoskeletal:  Negative      Neurologic:  Positive for memory problems;numbness or tingling of hands;headaches    Psychiatric:  Positive for anxiety    Heme/Lymph/Imm:  Negative      Endocrine:  Negative        Physical Exam:  Vitals: /78  Pulse 68  Wt 71.2 kg (157 lb)  SpO2 95%  BMI 24.96 kg/m2    Constitutional:  cooperative, alert and oriented, well developed, well nourished, in no acute distress        Skin:  warm and dry to the touch          Head:  normocephalic        Eyes:  sclera white        Lymph:      ENT:           Neck:  carotid pulses are full and equal bilaterally        Respiratory:  no intercostal retraction    diffuse and moderate decrease in BS with diffuse bilateral expiratory low frequency wheezes    Cardiac: regular rhythm, normal S1/S2, no S3 or S4, apical impulse not displaced, no murmurs, gallops or rubs                                                         GI:           Extremities and Muscular Skeletal:  no deformities, clubbing, cyanosis, erythema observed;no edema              Neurological:  no gross motor deficits;affect appropriate        Psych:  Alert and Oriented x 3;affect appropriate, oriented to time, person and place        Thank you for allowing me to participate in the care of your patient.    Sincerely,     Sheron Berry MD      [facility]

## 2018-01-23 NOTE — MR AVS SNAPSHOT
"              After Visit Summary   1/23/2018    Chadwick Aguilar    MRN: [id number]           Patient Information     Date Of Birth          1945        Visit Information        Provider Department      1/23/2018 1:00 PM Sheron Berry MD [facility]        Today's Diagnoses     PVD (peripheral vascular disease) (H)    -  1    Abnormal cardiovascular stress test           Follow-ups after your visit        Additional Services     Follow-Up with Cardiologist                 Future tests that were ordered for you today     Open Future Orders        Priority Expected Expires Ordered    Follow-Up with Cardiologist Routine 5/4/2018 1/23/2019 1/23/2018            Who to contact     If you have questions or need follow up information about today's clinic visit or your schedule please contact [facility] directly at [phone].  Normal or non-critical lab and imaging results will be communicated to you by ShowEvidencehart, letter or phone within 4 business days after the clinic has received the results. If you do not hear from us within 7 days, please contact the clinic through ShowEvidencehart or phone. If you have a critical or abnormal lab result, we will notify you by phone as soon as possible.  Submit refill requests through Azumio or call your pharmacy and they will forward the refill request to us. Please allow 3 business days for your refill to be completed.          Additional Information About Your Visit        MyChart Information     Azumio lets you send messages to your doctor, view your test results, renew your prescriptions, schedule appointments and more. To sign up, go to [email or web address] . Click on \"Log in\" on the left side of the screen, which will take you to the Welcome page. Then click on \"Sign up Now\" on the right side of the page.     You will be asked to enter the access code listed below, as well as some personal " information. Please follow the directions to create your username and password.     Your access code is: [email or web address]  Expires: 2018 11:28 AM     Your access code will  in 90 days. If you need help or a new code, please call your [facility] clinic or [phone].        Care EveryWhere ID     This is your Care EveryWhere ID. This could be used by other organizations to access your [facility] medical records  [id number]        Your Vitals Were     Pulse Pulse Oximetry BMI (Body Mass Index)             68 95% 24.96 kg/m2          Blood Pressure from Last 3 Encounters:   18 135/78   17 149/84   17 126/76    Weight from Last 3 Encounters:   18 71.2 kg (157 lb)   17 67.2 kg (148 lb 3.2 oz)   17 67.5 kg (148 lb 12.8 oz)                 Today's Medication Changes          These changes are accurate as of: 18  1:32 PM.  If you have any questions, ask your nurse or doctor.               These medicines have changed or have updated prescriptions.        Dose/Directions    aspirin 81 MG chewable tablet   This may have changed:  Another medication with the same name was removed. Continue taking this medication, and follow the directions you see here.   Used for:  Abnormal nuclear cardiac imaging test   Changed by:  Jessi Hills MD        Dose:  81 mg   Take 1 tablet (81 mg) by mouth daily   Quantity:  108 tablet   Refills:  3                Primary Care Provider Office Phone # Fax #    Jessi Hills [phone] [address] [facility] [address]        Equal Access to Services     [facility] AH: Hadii lisa jean baptiste hadashlili Soarely, waaxda luqadaha, qaybta kaalmada chloe cortez. So [facility] [phone].    ATENCIÓN: Si habla español, tiene a tamez disposición servicios gratuitos de asistencia lingüística. Llame al [phone].    We comply with applicable federal civil rights laws and [place]  laws. We do not discriminate on the basis of race, color, national origin, age, disability, sex, sexual orientation, or gender identity.            Thank you!     Thank you for choosing [facility]  for your care. Our goal is always to provide you with excellent care. Hearing back from our patients is one way we can continue to improve our services. Please take a few minutes to complete the written survey that you may receive in the mail after your visit with us. Thank you!             Your Updated Medication List - Protect others around you: Learn how to safely use, store and throw away your medicines at www.disposemymeds.org.          This list is accurate as of: 1/23/18  1:32 PM.  Always use your most recent med list.                   Brand Name Dispense Instructions for use Diagnosis    albuterol (2.5 MG/3ML) 0.083% neb solution     25 vial    Take 1 vial (2.5 mg) by nebulization every 6 hours as needed for shortness of breath / dyspnea or wheezing    Chronic obstructive pulmonary disease, unspecified COPD type (H)       aspirin 81 MG chewable tablet     108 tablet    Take 1 tablet (81 mg) by mouth daily    Abnormal nuclear cardiac imaging test       citalopram 20 MG tablet    celeXA    90 tablet    Take 1 tablet (20 mg) by mouth daily    Dementia without behavioral disturbance, unspecified dementia type       clopidogrel 75 MG tablet    PLAVIX    90 tablet    Take 1 tablet (75 mg) by mouth daily        fluticasone-salmeterol 250-50 MCG/DOSE diskus inhaler    ADVAIR DISKUS    60 Inhaler    Inhale 1 puff into the lungs 2 times daily    Chronic obstructive pulmonary disease, unspecified COPD type (H)       lisinopril 40 MG tablet    PRINIVIL/ZESTRIL    90 tablet    Take 1 tablet (40 mg) by mouth daily    Essential hypertension, benign       memantine 10 MG tablet    NAMENDA    180 tablet    Take 1 tablet (10 mg) by mouth 2 times daily    Dementia without behavioral  disturbance, unspecified dementia type       metoprolol succinate 100 MG 24 hr tablet    TOPROL-XL    90 tablet    Take 1 tablet (100 mg) by mouth daily    Essential hypertension, benign       MULTIVITAMIN ADULT PO           nitroGLYcerin 0.4 MG sublingual tablet    NITROSTAT    25 tablet    For chest pain place 1 tablet under the tongue every 5 minutes for 3 doses. If symptoms persist 5 minutes after 1st dose call 911.    Abnormal nuclear cardiac imaging test       * order for DME     1 each    Equipment being ordered: shower chair    Chronic obstructive pulmonary disease, unspecified COPD type (H), Exertional dyspnea       * order for DME     1 each    Equipment being ordered: Portable oxygen concentrator with O2 @ 2L NC PRN shortnes of breath.    Chronic obstructive pulmonary disease, unspecified COPD type (H)       simvastatin 5 MG tablet    ZOCOR    90 tablet    Take 1 tablet (5 mg) by mouth At Bedtime    Arteriosclerotic vascular disease       tiotropium 18 MCG capsule    SPIRIVA HANDIHALER    90 capsule    Inhale 1 capsule (18 mcg) into the lungs daily Inhale contents of one capsule daily.    Chronic obstructive pulmonary disease, unspecified COPD type (H)       VITAMIN B-1 PO      Take 250 mg by mouth daily        * Notice:  This list has 2 medication(s) that are the same as other medications prescribed for you. Read the directions carefully, and ask your doctor or other care provider to review them with you.

## 2018-01-23 NOTE — PROGRESS NOTES
HPI and Plan:   Mr. Chadwick gAuilar was seen in consultation at [facility] at [facility]. He is 72 years old and was accompanied by his wife.    He was referred by Dr. Hills. He has significant chronic obstructive pulmonary disease and was becoming increasingly dyspneic. Mr. Aguilar does not remember why the stress study was ordered but notes that with the initiation of supplemental oxygen and bronchodilators, his breathing has improved markedly. He denies any chest, neck, arm or back discomfort.    He underwent a Lexiscan stress nuclear study which demonstrated a reversible mid and distal inferior defect. One year ago, he was seen in [facility] for worsening dyspnea. A chest x-ray demonstrated a pneumonia. There was also concern that he was experiencing and ST elevation myocardial infarct. A helicopter transport was arranged At [facility]. He underwent coronary angiography which demonstrated a 10% stenosis in the LAD and a 40% stenosis in the right coronary artery. No further cardiology follow-up was recommended. He has demonstrated normal left ventricular systolic performance incised over 30 years ago, he was told that he could have cardiomegaly.    Since that time, he and his wife have moved one [facility] area where they are surrounded by family. Mrs. Aguilar noticed that his breathing improved markedly with the oxygen and bronchodilators.    On physical examination, this is a man in no apparent distress. He was alert and oriented to person place and time. The blood pressure was 135/78 mmHg, heart rate 66 beats per minute and regular respiratory rate is approximately 16-20/m. The chest revealed a diffuse decreased breath sounds with bilateral low frequency expiratory wheezing. Cardiac auscultation, there is S1 and S2 without extra sounds or murmur.    In assessment, Mr. Martin is completely asymptomatic without any anginal symptoms. He continues  to carry nitroglycerin. He is on aspirin, statin therapy and beta blockade as well as ace inhibition.    In the absence of symptoms, I have not recommended coronary angiography. I discussed this extensively with  and Mrs. Aguilar. Neither is anxious to proceed with coronary angiography and as a matter fact, Mrs. Aguilar did not think Mr. Aguilar would survive angiography. I reassured her that is not the case and he would likely tolerate angiography and stent placement very well. I explained that there is not currently an indication.    I further explained that stent placement does not prevent myocardial infarcts. I explained that medical therapy is most beneficial. If his shortness of breath had not improved,, it would not be unreasonable to proceed with coronary angiography MARKOS P[place] but his shortness of breath has improved markedly.    He continues to smoke 2-3 cigarettes daily. I again emphasized that complete cessation would lower his risk to the greater greatest extent. I also asked him to contact us if he develops any symptoms. I will follow-up with him in about 4 months. I would repeat a Lexiscan stress nuclear study at one year.      Orders Placed This Encounter   Procedures     Follow-Up with Cardiologist       Orders Placed This Encounter   Medications     Multiple Vitamins-Minerals (MULTIVITAMIN ADULT PO)       Medications Discontinued During This Encounter   Medication Reason     aspirin 81 MG tablet          Encounter Diagnoses   Name Primary?     PVD (peripheral vascular disease) (H) Yes     Abnormal cardiovascular stress test        CURRENT MEDICATIONS:  Current Outpatient Prescriptions   Medication Sig Dispense Refill     Multiple Vitamins-Minerals (MULTIVITAMIN ADULT PO)        nitroGLYcerin (NITROSTAT) 0.4 MG sublingual tablet For chest pain place 1 tablet under the tongue every 5 minutes for 3 doses. If symptoms persist 5 minutes after 1st dose call 911. 25 tablet 0     aspirin 81 MG  chewable tablet Take 1 tablet (81 mg) by mouth daily 108 tablet 3     tiotropium (SPIRIVA HANDIHALER) 18 MCG capsule Inhale 1 capsule (18 mcg) into the lungs daily Inhale contents of one capsule daily. 90 capsule 3     order for DME Equipment being ordered: Portable oxygen concentrator with O2 @ 2L NC PRN shortnes of breath. 1 each 0     fluticasone-salmeterol (ADVAIR DISKUS) 250-50 MCG/DOSE diskus inhaler Inhale 1 puff into the lungs 2 times daily 60 Inhaler 11     albuterol (2.5 MG/3ML) 0.083% neb solution Take 1 vial (2.5 mg) by nebulization every 6 hours as needed for shortness of breath / dyspnea or wheezing 25 vial 11     order for DME Equipment being ordered: shower chair 1 each 0     metoprolol (TOPROL-XL) 100 MG 24 hr tablet Take 1 tablet (100 mg) by mouth daily 90 tablet 3     lisinopril (PRINIVIL/ZESTRIL) 40 MG tablet Take 1 tablet (40 mg) by mouth daily 90 tablet 3     simvastatin (ZOCOR) 5 MG tablet Take 1 tablet (5 mg) by mouth At Bedtime 90 tablet 3     citalopram (CELEXA) 20 MG tablet Take 1 tablet (20 mg) by mouth daily 90 tablet 3     memantine (NAMENDA) 10 MG tablet Take 1 tablet (10 mg) by mouth 2 times daily 180 tablet 3     clopidogrel (PLAVIX) 75 MG tablet Take 1 tablet (75 mg) by mouth daily 90 tablet 1     Thiamine HCl (VITAMIN B-1 PO) Take 250 mg by mouth daily          ALLERGIES   No Known Allergies    PAST MEDICAL HISTORY:  No past medical history on file.    PAST SURGICAL HISTORY:  No past surgical history on file.    FAMILY HISTORY:  No family history on file.    SOCIAL HISTORY:  Social History     Social History     Marital status:      Spouse name: N/A     Number of children: N/A     Years of education: N/A     Social History Main Topics     Smoking status: Light Tobacco Smoker     Years: 50.00     Types: Cigarettes     Last attempt to quit: 12/23/2016     Smokeless tobacco: Never Used      Comment: uses patches PRN ? 1 a day occassionally     Alcohol use Yes      Comment:  occassionally     Drug use: No     Sexual activity: Not Asked     Other Topics Concern     None     Social History Narrative       Review of Systems:  Skin:  Negative       Eyes:  Positive for glasses    ENT:  Negative      Respiratory:  Positive for shortness of breath COPD   Cardiovascular:    palpitations;Positive for;edema;fatigue    Gastroenterology: Negative      Genitourinary:  Negative      Musculoskeletal:  Negative      Neurologic:  Positive for memory problems;numbness or tingling of hands;headaches    Psychiatric:  Positive for anxiety    Heme/Lymph/Imm:  Negative      Endocrine:  Negative        Physical Exam:  Vitals: /78  Pulse 68  Wt 71.2 kg (157 lb)  SpO2 95%  BMI 24.96 kg/m2    Constitutional:  cooperative, alert and oriented, well developed, well nourished, in no acute distress        Skin:  warm and dry to the touch          Head:  normocephalic        Eyes:  sclera white        Lymph:      ENT:           Neck:  carotid pulses are full and equal bilaterally        Respiratory:  no intercostal retraction    diffuse and moderate decrease in BS with diffuse bilateral expiratory low frequency wheezes    Cardiac: regular rhythm, normal S1/S2, no S3 or S4, apical impulse not displaced, no murmurs, gallops or rubs                                                         GI:           Extremities and Muscular Skeletal:  no deformities, clubbing, cyanosis, erythema observed;no edema              Neurological:  no gross motor deficits;affect appropriate        Psych:  Alert and Oriented x 3;affect appropriate, oriented to time, person and place          SARA Hills [place]  [address] [place] [address]

## 2018-03-13 ENCOUNTER — TELEPHONE (OUTPATIENT)
Dept: FAMILY MEDICINE | Facility: CLINIC | Age: 73
End: 2018-03-13

## 2018-03-13 DIAGNOSIS — I73.9 PVD (PERIPHERAL VASCULAR DISEASE) (H): Primary | ICD-10-CM

## 2018-03-13 DIAGNOSIS — I70.90 ARTERIOSCLEROTIC VASCULAR DISEASE: ICD-10-CM

## 2018-03-13 DIAGNOSIS — F03.90 DEMENTIA WITHOUT BEHAVIORAL DISTURBANCE, UNSPECIFIED DEMENTIA TYPE: ICD-10-CM

## 2018-03-13 NOTE — TELEPHONE ENCOUNTER
"Requested Prescriptions   Pending Prescriptions Disp Refills     citalopram (CELEXA) 20 MG tablet [Pharmacy Med Name: CITALOPRAM 20MG TAB] 90 tablet 3     Sig: TAKE ONE TABLET BY MOUTH EVERY DAY    SSRIs Protocol Passed    3/13/2018  8:13 AM       Passed - Recent (12 mo) or future (30 days) visit within the authorizing provider's specialty    Patient had office visit in the last 12 months or has a visit in the next 30 days with authorizing provider or within the authorizing provider's specialty.  See \"Patient Info\" tab in inbasket, or \"Choose Columns\" in Meds & Orders section of the refill encounter.           Passed - Patient is age 18 or older        simvastatin (ZOCOR) 5 MG tablet [Pharmacy Med Name: SIMVASTATIN 5MG TABS] 90 tablet 3     Sig: TAKE ONE TABLET BY MOUTH AT BEDTIME    Statins Protocol Passed    3/13/2018  8:13 AM       Passed - LDL on file in past 12 months    Recent Labs   Lab Test  03/17/17   1127   LDL  66            Passed - No abnormal creatine kinase in past 12 months    No lab results found.         Passed - Recent (12 mo) or future (30 days) visit within the authorizing provider's specialty    Patient had office visit in the last 12 months or has a visit in the next 30 days with authorizing provider or within the authorizing provider's specialty.  See \"Patient Info\" tab in inbasket, or \"Choose Columns\" in Meds & Orders section of the refill encounter.           Passed - Patient is age 18 or older        Last Written Prescription Date:  03/17/2017  Last Fill Quantity: 90,  # refills: 3   Last office visit: 12/6/2017 with prescribing provider:  12/06/2017   Future Office Visit:      "

## 2018-03-13 NOTE — TELEPHONE ENCOUNTER
"Requested Prescriptions   Pending Prescriptions Disp Refills     clopidogrel (PLAVIX) 75 MG tablet  Last Written Prescription Date:  02/07/2017  Last Fill Quantity: 90,  # refills: 1   Last office visit: 12/6/2017 with prescribing provider:  VELASQUEZ Hills   Future Office Visit:     90 tablet 1     Sig: Take 1 tablet (75 mg) by mouth daily    Plavix Failed    3/13/2018 11:40 AM       Failed - Normal HGB on file in past 12 months    No lab results found.           Failed - Normal Platelets on file in past 12 months    No lab results found.           Passed - No active PPI on record unless is Protonix       Passed - Recent (12 mo) or future (30 days) visit within the authorizing provider's specialty    Patient had office visit in the last 12 months or has a visit in the next 30 days with authorizing provider or within the authorizing provider's specialty.  See \"Patient Info\" tab in inbasket, or \"Choose Columns\" in Meds & Orders section of the refill encounter.           Passed - Patient is age 18 or older        Glynn TEJADA (R)    "

## 2018-03-14 RX ORDER — CITALOPRAM HYDROBROMIDE 20 MG/1
TABLET ORAL
Qty: 30 TABLET | Refills: 0 | Status: SHIPPED | OUTPATIENT
Start: 2018-03-14 | End: 2018-08-20

## 2018-03-14 RX ORDER — SIMVASTATIN 5 MG
TABLET ORAL
Qty: 30 TABLET | Refills: 0 | Status: SHIPPED | OUTPATIENT
Start: 2018-03-14 | End: 2018-08-20

## 2018-03-14 RX ORDER — CLOPIDOGREL BISULFATE 75 MG/1
75 TABLET ORAL DAILY
Qty: 90 TABLET | Refills: 1 | Status: SHIPPED | OUTPATIENT
Start: 2018-03-14 | End: 2018-08-10

## 2018-03-14 NOTE — TELEPHONE ENCOUNTER
Reason for Call:  Medication or medication refill:    Do you use a [facility]?  Name of the pharmacy and phone number for the current request:  [facility] [phone]    Name of the medication requested: Pt's spouse Taya bolaños.  Pt is out of med and needs refill sent to [facility] today please.      Other request:     Can we leave a detailed message on this number? YES    Phone number patient can be reached at: [phone]    Best Time: any    Call taken on 3/14/2018 at 10:25 AM by Lucy Moreira

## 2018-03-14 NOTE — TELEPHONE ENCOUNTER
Routing refill request to provider for review/approval because:  Labs not current:  AST/ALT    Thank you  Lavonne BUSBY RN

## 2018-03-14 NOTE — TELEPHONE ENCOUNTER
Prescription approved per [facility] [place] [facility].    Lavonne BUSBY RN

## 2018-03-14 NOTE — TELEPHONE ENCOUNTER
Reason for Call:  Medication or medication refill:    Do you use a [facility]?  Name of the pharmacy and phone number for the current request:  [facility] [phone]    Name of the medication requested: Pt's spouse Taya bolaños.  Pt is out of med and needs refill sent to [facility] today please.      Other request:     Can we leave a detailed message on this number? YES    Phone number patient can be reached at: [phone]    Best Time: any    Call taken on 3/14/2018 at 10:25 AM by Lucy Moreira

## 2018-04-10 DIAGNOSIS — F03.90 DEMENTIA WITHOUT BEHAVIORAL DISTURBANCE, UNSPECIFIED DEMENTIA TYPE: ICD-10-CM

## 2018-04-10 DIAGNOSIS — I70.90 ARTERIOSCLEROTIC VASCULAR DISEASE: ICD-10-CM

## 2018-04-10 NOTE — TELEPHONE ENCOUNTER
"Requested Prescriptions   Pending Prescriptions Disp Refills     simvastatin (ZOCOR) 5 MG tablet [Pharmacy Med Name: SIMVASTATIN 5MG TABS]  Last Written Prescription Date:  03/14/18  Last Fill Quantity: 30,  # refills: 0   Last office visit: 12/6/2017 with prescribing provider:  12/06/17   Future Office Visit:     90 tablet 3     Sig: TAKE ONE TABLET BY MOUTH AT BEDTIME    Statins Protocol Failed    4/10/2018  8:28 AM       Failed - LDL on file in past 12 months    Recent Labs   Lab Test  03/17/17   1127   LDL  66          Passed - No abnormal creatine kinase in past 12 months    No lab results found.        Passed - Recent (12 mo) or future (30 days) visit within the authorizing provider's specialty    Patient had office visit in the last 12 months or has a visit in the next 30 days with authorizing provider or within the authorizing provider's specialty.  See \"Patient Info\" tab in inbasket, or \"Choose Columns\" in Meds & Orders section of the refill encounter.           Passed - Patient is age 18 or older        citalopram (CELEXA) 20 MG tablet [Pharmacy Med Name: CITALOPRAM 20MG TAB]  Last Written Prescription Date:  03/14/18  Last Fill Quantity: 30,  # refills: 0   Last office visit: 12/6/2017 with prescribing provider:  12/06/17   Future Office Visit:     90 tablet 3     Sig: TAKE ONE TABLET BY MOUTH EVERY DAY    SSRIs Protocol Passed    4/10/2018  8:28 AM       Passed - Recent (12 mo) or future (30 days) visit within the authorizing provider's specialty    Patient had office visit in the last 12 months or has a visit in the next 30 days with authorizing provider or within the authorizing provider's specialty.  See \"Patient Info\" tab in inbasket, or \"Choose Columns\" in Meds & Orders section of the refill encounter.           Passed - Patient is age 18 or older        memantine (NAMENDA) 10 MG tablet [Pharmacy Med Name: MEMANTINE HCL 10MG TABS]  Last Written Prescription Date:  03/17/17  Last Fill Quantity: 180,  " "# refills: 3   Last office visit: 12/6/2017 with prescribing provider:  12/06/17   Future Office Visit:     180 tablet 3     Sig: TAKE ONE TABLET BY MOUTH TWICE A DAY    Miscellaneous Dementia Agents Passed    4/10/2018  8:28 AM       Passed - Recent (12 mo) or future (30 days) visit within the authorizing provider's specialty    Patient had office visit in the last 12 months or has a visit in the next 30 days with authorizing provider or within the authorizing provider's specialty.  See \"Patient Info\" tab in inbasket, or \"Choose Columns\" in Meds & Orders section of the refill encounter.           Passed - Patient is 18 years of age or older          "

## 2018-04-13 RX ORDER — MEMANTINE HYDROCHLORIDE 10 MG/1
TABLET ORAL
Qty: 180 TABLET | Refills: 3 | Status: SHIPPED | OUTPATIENT
Start: 2018-04-13 | End: 2018-08-10

## 2018-04-13 RX ORDER — SIMVASTATIN 5 MG
TABLET ORAL
Qty: 90 TABLET | Refills: 3 | Status: SHIPPED | OUTPATIENT
Start: 2018-04-13 | End: 2018-08-10

## 2018-04-13 RX ORDER — CITALOPRAM HYDROBROMIDE 20 MG/1
TABLET ORAL
Qty: 90 TABLET | Refills: 3 | Status: SHIPPED | OUTPATIENT
Start: 2018-04-13 | End: 2018-08-10

## 2018-04-13 NOTE — TELEPHONE ENCOUNTER
Patients wife is calling and needing these expedited today, as they will be out on Saturday. Please send to our [facility].  Please call [phone] when sent.  Marisol Starkey  [facility]

## 2018-04-13 NOTE — TELEPHONE ENCOUNTER
Routing refill request to provider for review/approval because:  Jyoti given x1 and patient did not follow up, please advise  Will be out of medication in one day.    Thank you  Lavonne BUSBY RN

## 2018-04-18 ENCOUNTER — MEDICAL CORRESPONDENCE (OUTPATIENT)
Dept: HEALTH INFORMATION MANAGEMENT | Facility: CLINIC | Age: 73
End: 2018-04-18

## 2018-04-27 ENCOUNTER — TELEPHONE (OUTPATIENT)
Dept: FAMILY MEDICINE | Facility: CLINIC | Age: 73
End: 2018-04-27

## 2018-04-27 NOTE — TELEPHONE ENCOUNTER
Dr. Hills,    Wow I contacted the wife.  The wife wants to leave things the way they are with using [facility].  She states they have the albuterol nebs but they are for emergency use.  I have explained they are faster acting and are for when he is SOA.  Apparently they have not used them ever.  The wife says that Linnette had made it sound like we get a kick back for prescribing the meds.  I have informed the wife that this is against the law for us to take kick backs.  The wife of the patient states Linnette would be contacting us.  I have encouraged the wife to have Linnette contact us from now on with any concerns with the patients breathing needs. Joselyn SCHULER RN

## 2018-04-27 NOTE — TELEPHONE ENCOUNTER
Dr. Hills,    I reviewed the last office visit and a few phone messages but don't see why we would take him off nebs.  Please see message below and advise. Joselyn SCHULER RN

## 2018-04-27 NOTE — TELEPHONE ENCOUNTER
No I don't get any kickbacks for using [facility]. I did not put in an order for Linnette to recommend nebs. In fact, at the last visit we took him off them. Stay with plan as per my last visit and I will deal with [employer] when they call.

## 2018-04-27 NOTE — TELEPHONE ENCOUNTER
I took him off his maintenance steroid nebs because he refused to do them.  During the time it took to do them he would pull the mask off.  So I switched him to Spiriva and Advair which he is willing to do.  He still has albuterol nebs but may not be using these either. If it's due to refusal we could try an albuterol inhaler with an aero chamber.

## 2018-04-27 NOTE — TELEPHONE ENCOUNTER
Reason for Call:  Nebulizer vs. Inhalers    Detailed comments: patients wife is calling and stating that Linnette was out to their house yesterday, and recommended to them that they change back to the Nebulizer treatment and that [facility] can provide that for them. They are confused. His wife would prefer he stay on his inhalers. Dr. JOSH Hills took him off his nebulizers. Please advise. Patients wife will not be available to speak until after 12 noon today.    Phone Number Patient can be reached at: Home number on file [phone] (home)    Best Time: any    Can we leave a detailed message on this number? YES   Marisol Starkey  [facility] [place] [facility]      Call taken on 4/27/2018 at 8:57 AM by Marisol Starkey

## 2018-05-02 ENCOUNTER — MEDICAL CORRESPONDENCE (OUTPATIENT)
Dept: HEALTH INFORMATION MANAGEMENT | Facility: CLINIC | Age: 73
End: 2018-05-02

## 2018-06-26 DIAGNOSIS — R93.1 ABNORMAL NUCLEAR CARDIAC IMAGING TEST: ICD-10-CM

## 2018-06-27 NOTE — TELEPHONE ENCOUNTER
"Requested Prescriptions   Pending Prescriptions Disp Refills     nitroGLYcerin (NITROSTAT) 0.4 MG sublingual tablet  Last Written Prescription Date:  12/21/2017  Last Fill Quantity: 25,  # refills: 0   Last office visit: 12/6/2017 with prescribing provider:  VELASQUEZ Hills   Future Office Visit:   Next 5 appointments (look out 90 days)     Aug 20, 2018 10:30 AM CDT   Return Visit with Sheron Berry MD   [facility] ([facility])    [address] [place] [id number] [phone]                  25 tablet 0     Sig: For chest pain place 1 tablet under the tongue every 5 minutes for 3 doses. If symptoms persist 5 minutes after 1st dose call 911.    Nitrates Failed    6/26/2018 11:43 AM       Failed - Sublingual nitro order needs review    If refill exceeds 1 bottle per month, please forward request to provider.          Passed - Blood pressure under 140/90 in past 12 months    BP Readings from Last 3 Encounters:   01/23/18 135/78   12/06/17 149/84   11/29/17 126/76                Passed - Pt is not on erectile dysfunction medications       Passed - Recent (12 mo) or future (30 days) visit within the authorizing provider's specialty    Patient had office visit in the last 12 months or has a visit in the next 30 days with authorizing provider or within the authorizing provider's specialty.  See \"Patient Info\" tab in inbasket, or \"Choose Columns\" in Meds & Orders section of the refill encounter.           Passed - Patient is age 18 or older        Glynn TEJADA (R)    "

## 2018-06-28 RX ORDER — NITROGLYCERIN 0.4 MG/1
TABLET SUBLINGUAL
Qty: 25 TABLET | Refills: 0 | Status: SHIPPED | OUTPATIENT
Start: 2018-06-28 | End: 2019-03-22

## 2018-06-28 NOTE — TELEPHONE ENCOUNTER
This did NOT fail the protocol of more than one bottle (25 tablets) in one month.  His last refill was 12-21-17 for qty 25, that was 6 months ago.    Prescription approved per [facility] Refill Protocol.  Brooke MERCADO RN

## 2018-08-10 ENCOUNTER — OFFICE VISIT (OUTPATIENT)
Dept: FAMILY MEDICINE | Facility: CLINIC | Age: 73
End: 2018-08-10
Payer: COMMERCIAL

## 2018-08-10 VITALS
WEIGHT: 154.4 LBS | RESPIRATION RATE: 20 BRPM | HEART RATE: 61 BPM | TEMPERATURE: 98.6 F | BODY MASS INDEX: 24.23 KG/M2 | SYSTOLIC BLOOD PRESSURE: 132 MMHG | OXYGEN SATURATION: 96 % | DIASTOLIC BLOOD PRESSURE: 70 MMHG | HEIGHT: 67 IN

## 2018-08-10 DIAGNOSIS — I10 ESSENTIAL HYPERTENSION, BENIGN: ICD-10-CM

## 2018-08-10 DIAGNOSIS — I70.90 ARTERIOSCLEROTIC VASCULAR DISEASE: ICD-10-CM

## 2018-08-10 DIAGNOSIS — J44.9 CHRONIC OBSTRUCTIVE PULMONARY DISEASE, UNSPECIFIED COPD TYPE (H): ICD-10-CM

## 2018-08-10 DIAGNOSIS — I73.9 PVD (PERIPHERAL VASCULAR DISEASE) (H): ICD-10-CM

## 2018-08-10 DIAGNOSIS — Z00.00 WELL ADULT EXAM: Primary | ICD-10-CM

## 2018-08-10 DIAGNOSIS — Z12.11 SPECIAL SCREENING FOR MALIGNANT NEOPLASMS, COLON: ICD-10-CM

## 2018-08-10 DIAGNOSIS — F03.90 DEMENTIA WITHOUT BEHAVIORAL DISTURBANCE, UNSPECIFIED DEMENTIA TYPE: ICD-10-CM

## 2018-08-10 LAB
ALBUMIN SERPL-MCNC: 3.5 G/DL (ref 3.4–5)
ALP SERPL-CCNC: 37 U/L (ref 40–150)
ALT SERPL W P-5'-P-CCNC: 24 U/L (ref 0–70)
ANION GAP SERPL CALCULATED.3IONS-SCNC: 5 MMOL/L (ref 3–14)
AST SERPL W P-5'-P-CCNC: 19 U/L (ref 0–45)
BASOPHILS # BLD AUTO: 0 10E9/L (ref 0–0.2)
BASOPHILS NFR BLD AUTO: 0.4 %
BILIRUB SERPL-MCNC: 0.7 MG/DL (ref 0.2–1.3)
BUN SERPL-MCNC: 14 MG/DL (ref 7–30)
CALCIUM SERPL-MCNC: 8.6 MG/DL (ref 8.5–10.1)
CHLORIDE SERPL-SCNC: 99 MMOL/L (ref 94–109)
CHOLEST SERPL-MCNC: 169 MG/DL
CO2 SERPL-SCNC: 30 MMOL/L (ref 20–32)
CREAT SERPL-MCNC: 0.88 MG/DL (ref 0.66–1.25)
DIFFERENTIAL METHOD BLD: NORMAL
EOSINOPHIL # BLD AUTO: 0.5 10E9/L (ref 0–0.7)
EOSINOPHIL NFR BLD AUTO: 6.1 %
ERYTHROCYTE [DISTWIDTH] IN BLOOD BY AUTOMATED COUNT: 13.5 % (ref 10–15)
GFR SERPL CREATININE-BSD FRML MDRD: 84 ML/MIN/1.7M2
GLUCOSE SERPL-MCNC: 100 MG/DL (ref 70–99)
HCT VFR BLD AUTO: 42 % (ref 40–53)
HDLC SERPL-MCNC: 54 MG/DL
HGB BLD-MCNC: 14 G/DL (ref 13.3–17.7)
LDLC SERPL CALC-MCNC: 100 MG/DL
LYMPHOCYTES # BLD AUTO: 1.3 10E9/L (ref 0.8–5.3)
LYMPHOCYTES NFR BLD AUTO: 17.2 %
MCH RBC QN AUTO: 31.1 PG (ref 26.5–33)
MCHC RBC AUTO-ENTMCNC: 33.3 G/DL (ref 31.5–36.5)
MCV RBC AUTO: 93 FL (ref 78–100)
MONOCYTES # BLD AUTO: 1.1 10E9/L (ref 0–1.3)
MONOCYTES NFR BLD AUTO: 14.5 %
NEUTROPHILS # BLD AUTO: 4.6 10E9/L (ref 1.6–8.3)
NEUTROPHILS NFR BLD AUTO: 61.8 %
NONHDLC SERPL-MCNC: 115 MG/DL
PLATELET # BLD AUTO: 181 10E9/L (ref 150–450)
POTASSIUM SERPL-SCNC: 4.6 MMOL/L (ref 3.4–5.3)
PROT SERPL-MCNC: 7.1 G/DL (ref 6.8–8.8)
RBC # BLD AUTO: 4.5 10E12/L (ref 4.4–5.9)
SODIUM SERPL-SCNC: 134 MMOL/L (ref 133–144)
TRIGL SERPL-MCNC: 73 MG/DL
WBC # BLD AUTO: 7.4 10E9/L (ref 4–11)

## 2018-08-10 PROCEDURE — 80053 COMPREHEN METABOLIC PANEL: CPT | Performed by: FAMILY MEDICINE

## 2018-08-10 PROCEDURE — 36415 COLL VENOUS BLD VENIPUNCTURE: CPT | Performed by: FAMILY MEDICINE

## 2018-08-10 PROCEDURE — 80061 LIPID PANEL: CPT | Performed by: FAMILY MEDICINE

## 2018-08-10 PROCEDURE — 85025 COMPLETE CBC W/AUTO DIFF WBC: CPT | Performed by: FAMILY MEDICINE

## 2018-08-10 PROCEDURE — 99397 PER PM REEVAL EST PAT 65+ YR: CPT | Performed by: FAMILY MEDICINE

## 2018-08-10 RX ORDER — METOPROLOL SUCCINATE 100 MG/1
100 TABLET, EXTENDED RELEASE ORAL DAILY
Qty: 90 TABLET | Refills: 3 | Status: ON HOLD | OUTPATIENT
Start: 2018-08-10 | End: 2018-12-20

## 2018-08-10 RX ORDER — CLOPIDOGREL BISULFATE 75 MG/1
75 TABLET ORAL DAILY
Qty: 90 TABLET | Refills: 3 | Status: ON HOLD | OUTPATIENT
Start: 2018-08-10 | End: 2018-12-17 | Stop reason: ALTCHOICE

## 2018-08-10 RX ORDER — MEMANTINE HYDROCHLORIDE 10 MG/1
10 TABLET ORAL 2 TIMES DAILY
Qty: 180 TABLET | Refills: 3 | Status: SHIPPED | OUTPATIENT
Start: 2018-08-10 | End: 2020-01-01

## 2018-08-10 RX ORDER — LISINOPRIL 40 MG/1
40 TABLET ORAL DAILY
Qty: 90 TABLET | Refills: 3 | Status: SHIPPED | OUTPATIENT
Start: 2018-08-10

## 2018-08-10 RX ORDER — CITALOPRAM HYDROBROMIDE 20 MG/1
20 TABLET ORAL DAILY
Qty: 90 TABLET | Refills: 3 | Status: SHIPPED | OUTPATIENT
Start: 2018-08-10

## 2018-08-10 RX ORDER — SIMVASTATIN 5 MG
5 TABLET ORAL AT BEDTIME
Qty: 90 TABLET | Refills: 3 | Status: SHIPPED | OUTPATIENT
Start: 2018-08-10 | End: 2018-08-20

## 2018-08-10 RX ORDER — TIOTROPIUM BROMIDE 18 UG/1
18 CAPSULE ORAL; RESPIRATORY (INHALATION) DAILY
Qty: 90 CAPSULE | Refills: 3 | Status: SHIPPED | OUTPATIENT
Start: 2018-08-10 | End: 2020-01-01

## 2018-08-10 ASSESSMENT — PAIN SCALES - GENERAL: PAINLEVEL: NO PAIN (0)

## 2018-08-10 NOTE — PROGRESS NOTES
"  SUBJECTIVE:   Chadwick Aguilar is a 73 year old male who presents for Preventive Visit.     Discussed that additional charges may be applied for addressing additional concerns at today's visit.  Patient verbalized understanding and would like additional concerns addressed today.     Are you in the first 12 months of your Medicare Part B coverage?  No    Healthy Habits:    Do you get at least three servings of calcium containing foods daily (dairy, green leafy vegetables, etc.)? yes    Amount of exercise or daily activities, outside of work: none, \"just around the house\"    Problems taking medications regularly No    Medication side effects: No    Have you had an eye exam in the past two years? no    Do you see a dentist twice per year? no    Do you have sleep apnea, excessive snoring or daytime drowsiness?no      Ability to successfully perform activities of daily living: Yes, no assistance needed    Home safety:  throw rugs in the hallway, lack of grab bars in the bathroom      Hearing impairment: No    Fall risk:          COGNITIVE SCREEN  1) Repeat 3 items (Leader, Season, Table)    2) Clock draw: NORMAL  3) 3 item recall: Normal clock & Recalls NO objects   Results: 0 items recalled: PROBABLE COGNITIVE IMPAIRMENT, **INFORM PROVIDER**    Mini-CogTM Copyright S Kareem. Licensed by the author for use in [facility]; reprinted with permission (sousama@.[email or web address]). All rights reserved.            Chief Complaint   Patient presents with     Wellness Visit     concerns with twitching at night. Wife states it was almost like he was having a seizure.  Happened one night. Lasted a few seconds. Arms were periodically jercking. No tongue bitting No loss of bowel of bladder function. This was occurring while falling asleep;.     Bleeding/Bruising     concerns with easy bruising     Refill Request     rx refill on all medicaitons        Reviewed and updated as needed this visit by clinical staff  Tobacco  Allergies "  Med Hx  Surg Hx  Fam Hx  Soc Hx        Reviewed and updated as needed this visit by Provider        Social History   Substance Use Topics     Smoking status: Former Smoker     Years: 50.00     Types: Cigarettes     Quit date: 12/23/2016     Smokeless tobacco: Never Used      Comment: uses patches PRN ? 1 a day occassionally     Alcohol use Yes      Comment: occassionally       If you drink alcohol do you typically have >3 drinks per day or >7 drinks per week? No                        Today's PHQ-2 Score:   PHQ-2 ( 1999 [employer]) 11/29/2017 9/7/2017   Q1: Little interest or pleasure in doing things 0 0   Q2: Feeling down, depressed or hopeless 0 0   PHQ-2 Score 0 0       Do you feel safe in your environment - Yes    Do you have a Health Care Directive?: No: Advance care planning reviewed with patient; information given to patient to review.    Current providers sharing in care for this patient include:   Patient Care Team:  Jessi Hills MD as PCP - General (Family Practice)    The following health maintenance items are reviewed in Epic and correct as of today:  Health Maintenance   Topic Date Due     FIT Q1 YR  03/11/1955     AORTIC ANEURYSM SCREENING (SYSTEM ASSIGNED)  03/11/2008     INFLUENZA VACCINE (1) 09/01/2018     FALL RISK ASSESSMENT  11/29/2018     PHQ-2 Q1 YR  11/29/2018     COPD ACTION PLAN Q1 YR  12/06/2018     LIPID SCREEN Q5 YR MALE (SYSTEM ASSIGNED)  03/17/2022     ADVANCE DIRECTIVE PLANNING Q5 YRS  03/17/2022     TETANUS IMMUNIZATION (SYSTEM ASSIGNED)  07/25/2026     SPIROMETRY ONETIME  Completed     PNEUMOCOCCAL  Completed     Labs reviewed in EPIC  Patient Active Problem List   Diagnosis     PVD (peripheral vascular disease) (H)     Chronic obstructive pulmonary disease, unspecified COPD type (H)     Dementia without behavioral disturbance, unspecified dementia type     Claudication (H)     Arteriosclerotic vascular disease     Essential hypertension, benign     Hyperlipidemia LDL  goal <70     Advanced directives, counseling/discussion     History reviewed. No pertinent surgical history.    Social History   Substance Use Topics     Smoking status: Former Smoker     Years: 50.00     Types: Cigarettes     Quit date: 12/23/2016     Smokeless tobacco: Never Used      Comment: uses patches PRN ? 1 a day occassionally     Alcohol use Yes      Comment: occassionally     History reviewed. No pertinent family history.      Current Outpatient Prescriptions   Medication Sig Dispense Refill     albuterol (2.5 MG/3ML) 0.083% neb solution Take 1 vial (2.5 mg) by nebulization every 6 hours as needed for shortness of breath / dyspnea or wheezing 25 vial 11     aspirin 81 MG chewable tablet Take 1 tablet (81 mg) by mouth daily 108 tablet 3     citalopram (CELEXA) 20 MG tablet Take 1 tablet (20 mg) by mouth daily 90 tablet 3     clopidogrel (PLAVIX) 75 MG tablet Take 1 tablet (75 mg) by mouth daily 90 tablet 3     fluticasone-salmeterol (ADVAIR DISKUS) 250-50 MCG/DOSE diskus inhaler Inhale 1 puff into the lungs 2 times daily 60 Inhaler 11     lisinopril (PRINIVIL/ZESTRIL) 40 MG tablet Take 1 tablet (40 mg) by mouth daily 90 tablet 3     memantine (NAMENDA) 10 MG tablet Take 1 tablet (10 mg) by mouth 2 times daily 180 tablet 3     metoprolol succinate (TOPROL-XL) 100 MG 24 hr tablet Take 1 tablet (100 mg) by mouth daily 90 tablet 3     Multiple Vitamins-Minerals (MULTIVITAMIN ADULT PO)        nitroGLYcerin (NITROSTAT) 0.4 MG sublingual tablet For chest pain place 1 tablet under the tongue every 5 minutes for 3 doses. If symptoms persist 5 minutes after 1st dose call 911. 25 tablet 0     order for DME Equipment being ordered: Portable oxygen concentrator with O2 @ 2L NC PRN shortnes of breath. 1 each 0     order for DME Equipment being ordered: shower chair 1 each 0     Thiamine HCl (VITAMIN B-1 PO) Take 250 mg by mouth daily        tiotropium (SPIRIVA HANDIHALER) 18 MCG capsule Inhale 1 capsule (18 mcg) into  "the lungs daily Inhale contents of one capsule daily. 90 capsule 3     simvastatin (ZOCOR) 10 MG tablet Take 1 tablet (10 mg) by mouth At Bedtime 93 tablet 3     No Known Allergies      ROS:  Constitutional, neuro, ENT, endocrine, pulmonary, cardiac, gastrointestinal, genitourinary, musculoskeletal, integument and psychiatric systems are negative, except as otherwise noted.     OBJECTIVE:   /70 (BP Location: Right arm, Cuff Size: Adult Regular)  Pulse 61  Temp 98.6  F (37  C) (Tympanic)  Resp 20  Ht 5' 6.75\" (1.695 m)  Wt 154 lb 6.4 oz (70 kg)  SpO2 96%  BMI 24.36 kg/m2 Estimated body mass index is 24.36 kg/(m^2) as calculated from the following:    Height as of this encounter: 5' 6.75\" (1.695 m).    Weight as of this encounter: 154 lb 6.4 oz (70 kg).  EXAM:   GENERAL: healthy, alert and no distress  EYES: Eyes grossly normal to inspection, PERRL and conjunctivae and sclerae normal  HENT: ear canals and TM's normal, nose and mouth without ulcers or lesions  NECK: no adenopathy, no asymmetry, masses, or scars and thyroid normal to palpation  RESP: lungs clear to auscultation - no rales, rhonchi or wheezes  CV: regular rate and rhythm, normal S1 S2, no S3 or S4, no murmur, click or rub, no peripheral edema and peripheral pulses strong  ABDOMEN: soft, nontender, no hepatosplenomegaly, no masses and bowel sounds normal  MS: no gross musculoskeletal defects noted, no edema  SKIN: no suspicious lesions or rashes  NEURO: Normal strength and tone, mentation intact and speech normal  PSYCH: mentation appears normal, affect normal/bright      ASSESSMENT / PLAN:   1. Well adult exam    2. Chronic obstructive pulmonary disease, unspecified COPD type (H)  Well controlled. Refilled medication.     - fluticasone-salmeterol (ADVAIR DISKUS) 250-50 MCG/DOSE diskus inhaler; Inhale 1 puff into the lungs 2 times daily  Dispense: 60 Inhaler; Refill: 11  - tiotropium (SPIRIVA HANDIHALER) 18 MCG capsule; Inhale 1 capsule (18 " "mcg) into the lungs daily Inhale contents of one capsule daily.  Dispense: 90 capsule; Refill: 3    3. PVD (peripheral vascular disease) (H)  Well controlled. Refilled medication.     - clopidogrel (PLAVIX) 75 MG tablet; Take 1 tablet (75 mg) by mouth daily  Dispense: 90 tablet; Refill: 3  - Lipid Profile (Chol, Trig, HDL, LDL calc)    4. Dementia without behavioral disturbance, unspecified dementia type  Stable. Refilled medication.    - citalopram (CELEXA) 20 MG tablet; Take 1 tablet (20 mg) by mouth daily  Dispense: 90 tablet; Refill: 3  - memantine (NAMENDA) 10 MG tablet; Take 1 tablet (10 mg) by mouth 2 times daily  Dispense: 180 tablet; Refill: 3    5. Essential hypertension, benign  Well controlled. Refilled medication.    - lisinopril (PRINIVIL/ZESTRIL) 40 MG tablet; Take 1 tablet (40 mg) by mouth daily  Dispense: 90 tablet; Refill: 3  - metoprolol succinate (TOPROL-XL) 100 MG 24 hr tablet; Take 1 tablet (100 mg) by mouth daily  Dispense: 90 tablet; Refill: 3  - CBC with platelets differential  - Comprehensive metabolic panel    6. Arteriosclerotic vascular disease  - clopidogrel (PLAVIX) 75 MG tablet; Take 1 tablet (75 mg) by mouth daily  Dispense: 90 tablet; Refill: 3  - Lipid Profile (Chol, Trig, HDL, LDL calc)    7. Special screening for malignant neoplasms, colon  - Fecal colorectal cancer screen (FIT); Future    End of Life Planning:  Patient currently has an advanced directive: No.  I have verified the patient's ablity to prepare an advanced directive/make health care decisions.  Literature was provided to assist patient in preparing an advanced directive.    COUNSELING:  Reviewed preventive health counseling, as reflected in patient instructions    BP Readings from Last 1 Encounters:   08/10/18 132/70     Estimated body mass index is 24.36 kg/(m^2) as calculated from the following:    Height as of this encounter: 5' 6.75\" (1.695 m).    Weight as of this encounter: 154 lb 6.4 oz (70 kg).           " reports that he quit smoking about 19 months ago. His smoking use included Cigarettes. He quit after 50.00 years of use. He has never used smokeless tobacco.      Appropriate preventive services were discussed with this patient, including applicable screening as appropriate for cardiovascular disease, diabetes, osteopenia/osteoporosis, and glaucoma.  As appropriate for age/gender, discussed screening for colorectal cancer, prostate cancer, breast cancer, and cervical cancer. Checklist reviewing preventive services available has been given to the patient.    Reviewed patients plan of care and provided an AVS. The Intermediate Care Plan ( asthma action plan, low back pain action plan, and migraine action plan) for Chadwick meets the Care Plan requirement. This Care Plan has been established and reviewed with the Patient and spouse.    Counseling Resources:  ATP IV Guidelines  Pooled Cohorts Equation Calculator  Breast Cancer Risk Calculator  FRAX Risk Assessment  ICSI Preventive Guidelines  Dietary Guidelines for Americans, 2010  USDA's MyPlate  ASA Prophylaxis  Lung CA Screening    Jessi Hills MD  [facility]

## 2018-08-10 NOTE — MR AVS SNAPSHOT
After Visit Summary   8/10/2018    Chadwick Aguilar    MRN: [id number]           Patient Information     Date Of Birth          1945        Visit Information        Provider Department      8/10/2018 7:40 AM Jessi Hills MD [facility]        Today's Diagnoses     Special screening for malignant neoplasms, colon    -  1    Dementia without behavioral disturbance, unspecified dementia type        PVD (peripheral vascular disease) (H)        Essential hypertension, benign        Chronic obstructive pulmonary disease, unspecified COPD type (H)        Arteriosclerotic vascular disease          Care Instructions      Preventive Health Recommendations:       Male Ages 65 and over    Yearly exam:             See your health care provider every year in order to  o   Review health changes.   o   Discuss preventive care.    o   Review your medicines if your doctor has prescribed any.    Talk with your health care provider about whether you should have a test to screen for prostate cancer (PSA).    Every 3 years, have a diabetes test (fasting glucose). If you are at risk for diabetes, you should have this test more often.    Every 5 years, have a cholesterol test. Have this test more often if you are at risk for high cholesterol or heart disease.     Every 10 years, have a colonoscopy. Or, have a yearly FIT test (stool test). These exams will check for colon cancer.    Talk to with your health care provider about screening for Abdominal Aortic Aneurysm if you have a family history of AAA or have a history of smoking.  Shots:     Get a flu shot each year.     Get a tetanus shot every 10 years.     Talk to your doctor about your pneumonia vaccines. There are now two you should receive - Pneumovax (PPSV 23) and Prevnar (PCV 13).    Talk to your pharmacist about a shingles vaccine.     Talk to your doctor about the hepatitis B vaccine.  Nutrition:     Eat at least 5 servings of  fruits and vegetables each day.     Eat whole-grain bread, whole-wheat pasta and brown rice instead of white grains and rice.     Get adequate Calcium and Vitamin D.   Lifestyle    Exercise for at least 150 minutes a week (30 minutes a day, 5 days a week). This will help you control your weight and prevent disease.     Limit alcohol to one drink per day.     No smoking.     Wear sunscreen to prevent skin cancer.     See your dentist every six months for an exam and cleaning.     See your eye doctor every 1 to 2 years to screen for conditions such as glaucoma, macular degeneration and cataracts.          Follow-ups after your visit        Your next 10 appointments already scheduled     Aug 20, 2018 10:30 AM CDT   Return Visit with Sheron Berry MD   [facility] ([facility])    [address] [place] [address] [phone]              Future tests that were ordered for you today     Open Future Orders        Priority Expected Expires Ordered    Fecal colorectal cancer screen (FIT) Routine 8/31/2018 11/2/2018 8/10/2018            Who to contact     If you have questions or need follow up information about today's clinic visit or your schedule please contact [facility] directly at [phone].  Normal or non-critical lab and imaging results will be communicated to you by Camile[employer] Heelshart, letter or phone within 4 business days after the clinic has received the results. If you do not hear from us within 7 days, please contact the clinic through Camileon Heelshart or phone. If you have a critical or abnormal lab result, we will notify you by phone as soon as possible.  Submit refill requests through [employer]ongSou or call your pharmacy and they will forward the refill request to us. Please allow 3 business days for your refill to be completed.          Additional Information About Your Visit        ZhongSou Information     ZhongSou lets you send messages to your  "doctor, view your test results, renew your prescriptions, schedule appointments and more. To sign up, go to [email or web address] . Click on \"Log in\" on the left side of the screen, which will take you to the Welcome page. Then click on \"Sign up Now\" on the right side of the page.     You will be asked to enter the access code listed below, as well as some personal information. Please follow the directions to create your username and password.     Your access code is: [email or web address]  Expires: 2018  8:35 AM     Your access code will  in 90 days. If you need help or a new code, please call your [place] clinic or [phone].        Care EveryWhere ID     This is your Care EveryWhere ID. This could be used by other organizations to access your [facility] [place] medical records  [id number]        Your Vitals Were     Pulse Temperature Respirations Height Pulse Oximetry BMI (Body Mass Index)    61 98.6  F (37  C) (Tympanic) 20 5' 6.75\" (1.695 m) 96% 24.36 kg/m2       Blood Pressure from Last 3 Encounters:   08/10/18 132/70   18 135/78   17 149/84    Weight from Last 3 Encounters:   08/10/18 154 lb 6.4 oz (70 kg)   18 157 lb (71.2 kg)   17 148 lb 3.2 oz (67.2 kg)              We Performed the Following     CBC with platelets differential     Comprehensive metabolic panel     Lipid Profile (Chol, Trig, HDL, LDL calc)          Today's Medication Changes          These changes are accurate as of 8/10/18  8:35 AM.  If you have any questions, ask your nurse or doctor.               These medicines have changed or have updated prescriptions.        Dose/Directions    * citalopram 20 MG tablet   Commonly known as:  celeXA   This may have changed:  Another medication with the same name was changed. Make sure you understand how and when to take each.   Used for:  Dementia without behavioral disturbance, unspecified dementia type   Changed by:  Jessi Hills MD        TAKE ONE TABLET BY " MOUTH EVERY DAY   Quantity:  30 tablet   Refills:  0       * citalopram 20 MG tablet   Commonly known as:  celeXA   This may have changed:  See the new instructions.   Used for:  Dementia without behavioral disturbance, unspecified dementia type   Changed by:  Jessi Hills MD        Dose:  20 mg   Take 1 tablet (20 mg) by mouth daily   Quantity:  90 tablet   Refills:  3       memantine 10 MG tablet   Commonly known as:  NAMENDA   This may have changed:  See the new instructions.   Used for:  Dementia without behavioral disturbance, unspecified dementia type   Changed by:  Jessi Hills MD        Dose:  10 mg   Take 1 tablet (10 mg) by mouth 2 times daily   Quantity:  180 tablet   Refills:  3       * simvastatin 5 MG tablet   Commonly known as:  ZOCOR   This may have changed:  Another medication with the same name was changed. Make sure you understand how and when to take each.   Used for:  Arteriosclerotic vascular disease   Changed by:  Jessi Hills MD        TAKE ONE TABLET BY MOUTH AT BEDTIME   Quantity:  30 tablet   Refills:  0       * simvastatin 5 MG tablet   Commonly known as:  ZOCOR   This may have changed:  See the new instructions.   Used for:  Arteriosclerotic vascular disease   Changed by:  Jessi Hills MD        Dose:  5 mg   Take 1 tablet (5 mg) by mouth At Bedtime   Quantity:  90 tablet   Refills:  3       * Notice:  This list has 4 medication(s) that are the same as other medications prescribed for you. Read the directions carefully, and ask your doctor or other care provider to review them with you.         Where to get your medicines      These medications were sent to [facility] [place], [place] - [address]  [address], [facility] [address]     Phone:  [phone]     citalopram 20 MG tablet    clopidogrel 75 MG tablet    fluticasone-salmeterol 250-50 MCG/DOSE diskus inhaler    lisinopril 40 MG  tablet    memantine 10 MG tablet    metoprolol succinate 100 MG 24 hr tablet    simvastatin 5 MG tablet    tiotropium 18 MCG capsule                Primary Care Provider Office Phone # Fax #    Jessi Danni Hills [phone] [address]  [facility] [address]        Equal Access to Services     HEBERT LESLIE : Hadii aad ku hadasho Soomaali, waaxda luqadaha, qaybta kaalmada adeegyada, waxay idiin hayaan adeeg param lagwendolynn . So [facility] [phone].    ATENCIÓN: Si habla español, tiene a tamez disposición servicios gratuitos de asistencia lingüística. Llame al [phone].    We comply with applicable federal civil rights laws and [place] laws. We do not discriminate on the basis of race, color, national origin, age, disability, sex, sexual orientation, or gender identity.            Thank you!     Thank you for choosing [facility]  for your care. Our goal is always to provide you with excellent care. Hearing back from our patients is one way we can continue to improve our services. Please take a few minutes to complete the written survey that you may receive in the mail after your visit with us. Thank you!             Your Updated Medication List - Protect others around you: Learn how to safely use, store and throw away your medicines at www.disposemymeds.org.          This list is accurate as of 8/10/18  8:35 AM.  Always use your most recent med list.                   Brand Name Dispense Instructions for use Diagnosis    albuterol (2.5 MG/3ML) 0.083% neb solution     25 vial    Take 1 vial (2.5 mg) by nebulization every 6 hours as needed for shortness of breath / dyspnea or wheezing    Chronic obstructive pulmonary disease, unspecified COPD type (H)       aspirin 81 MG chewable tablet     108 tablet    Take 1 tablet (81 mg) by mouth daily    Abnormal nuclear cardiac imaging test       * citalopram 20 MG tablet    celeXA    30 tablet    TAKE ONE TABLET BY MOUTH  EVERY DAY    Dementia without behavioral disturbance, unspecified dementia type       * citalopram 20 MG tablet    celeXA    90 tablet    Take 1 tablet (20 mg) by mouth daily    Dementia without behavioral disturbance, unspecified dementia type       clopidogrel 75 MG tablet    PLAVIX    90 tablet    Take 1 tablet (75 mg) by mouth daily    PVD (peripheral vascular disease) (H)       fluticasone-salmeterol 250-50 MCG/DOSE diskus inhaler    ADVAIR DISKUS    60 Inhaler    Inhale 1 puff into the lungs 2 times daily    Chronic obstructive pulmonary disease, unspecified COPD type (H)       lisinopril 40 MG tablet    PRINIVIL/ZESTRIL    90 tablet    Take 1 tablet (40 mg) by mouth daily    Essential hypertension, benign       memantine 10 MG tablet    NAMENDA    180 tablet    Take 1 tablet (10 mg) by mouth 2 times daily    Dementia without behavioral disturbance, unspecified dementia type       metoprolol succinate 100 MG 24 hr tablet    TOPROL-XL    90 tablet    Take 1 tablet (100 mg) by mouth daily    Essential hypertension, benign       MULTIVITAMIN ADULT PO           nitroGLYcerin 0.4 MG sublingual tablet    NITROSTAT    25 tablet    For chest pain place 1 tablet under the tongue every 5 minutes for 3 doses. If symptoms persist 5 minutes after 1st dose call 911.    Abnormal nuclear cardiac imaging test       * order for DME     1 each    Equipment being ordered: shower chair    Chronic obstructive pulmonary disease, unspecified COPD type (H), Exertional dyspnea       * order for DME     1 each    Equipment being ordered: Portable oxygen concentrator with O2 @ 2L NC PRN shortnes of breath.    Chronic obstructive pulmonary disease, unspecified COPD type (H)       * simvastatin 5 MG tablet    ZOCOR    30 tablet    TAKE ONE TABLET BY MOUTH AT BEDTIME    Arteriosclerotic vascular disease       * simvastatin 5 MG tablet    ZOCOR    90 tablet    Take 1 tablet (5 mg) by mouth At Bedtime    Arteriosclerotic vascular disease        tiotropium 18 MCG capsule    SPIRIVA HANDIHALER    90 capsule    Inhale 1 capsule (18 mcg) into the lungs daily Inhale contents of one capsule daily.    Chronic obstructive pulmonary disease, unspecified COPD type (H)       VITAMIN B-1 PO      Take 250 mg by mouth daily        * Notice:  This list has 6 medication(s) that are the same as other medications prescribed for you. Read the directions carefully, and ask your doctor or other care provider to review them with you.

## 2018-08-10 NOTE — LETTER
[facility]  [address]  [place] [address]  Phone: [phone]      8/30/2018     Chadwick Aguilar  [address] [place] [address]      Dear Chadwick:    Thank you for allowing me to participate in your care. Your recent test results were reviewed and listed below.  This looks stable.     Your results are provided below for your review  Results for orders placed or performed in visit on 08/10/18   CBC with platelets differential   Result Value Ref Range    WBC 7.4 4.0 - 11.0 10e9/L    RBC Count 4.50 4.4 - 5.9 10e12/L    Hemoglobin 14.0 13.3 - 17.7 g/dL    Hematocrit 42.0 40.0 - 53.0 %    MCV 93 78 - 100 fl    MCH 31.1 26.5 - 33.0 pg    MCHC 33.3 31.5 - 36.5 g/dL    RDW 13.5 10.0 - 15.0 %    Platelet Count 181 150 - 450 10e9/L    Diff Method Automated Method     % Neutrophils 61.8 %    % Lymphocytes 17.2 %    % Monocytes 14.5 %    % Eosinophils 6.1 %    % Basophils 0.4 %    Absolute Neutrophil 4.6 1.6 - 8.3 10e9/L    Absolute Lymphocytes 1.3 0.8 - 5.3 10e9/L    Absolute Monocytes 1.1 0.0 - 1.3 10e9/L    Absolute Eosinophils 0.5 0.0 - 0.7 10e9/L    Absolute Basophils 0.0 0.0 - 0.2 10e9/L   Comprehensive metabolic panel   Result Value Ref Range    Sodium 134 133 - 144 mmol/L    Potassium 4.6 3.4 - 5.3 mmol/L    Chloride 99 94 - 109 mmol/L    Carbon Dioxide 30 20 - 32 mmol/L    Anion Gap 5 3 - 14 mmol/L    Glucose 100 (H) 70 - 99 mg/dL    Urea Nitrogen 14 7 - 30 mg/dL    Creatinine 0.88 0.66 - 1.25 mg/dL    GFR Estimate 84 >60 mL/min/1.7m2    GFR Estimate If Black >90 >60 mL/min/1.7m2    Calcium 8.6 8.5 - 10.1 mg/dL    Bilirubin Total 0.7 0.2 - 1.3 mg/dL    Albumin 3.5 3.4 - 5.0 g/dL    Protein Total 7.1 6.8 - 8.8 g/dL    Alkaline Phosphatase 37 (L) 40 - 150 U/L    ALT 24 0 - 70 U/L    AST 19 0 - 45 U/L   Lipid Profile (Chol, Trig, HDL, LDL calc)   Result Value Ref Range    Cholesterol 169 <200 mg/dL    Triglycerides 73 <150 mg/dL    HDL Cholesterol 54 >39 mg/dL    LDL Cholesterol  Calculated 100 (H) <100 mg/dL    Non HDL Cholesterol 115 <130 mg/dL                 Thank you for choosing [facility]. As a result, please continue with the treatment plan discussed in the office. Return as discussed or sooner if symptoms worsen or fail to improve.     If you have any further questions or concerns, please do not hesitate to contact us.      Sincerely,        Dr. Jessi Hills

## 2018-08-20 ENCOUNTER — OFFICE VISIT (OUTPATIENT)
Dept: CARDIOLOGY | Facility: CLINIC | Age: 73
End: 2018-08-20
Attending: INTERNAL MEDICINE
Payer: COMMERCIAL

## 2018-08-20 VITALS
WEIGHT: 158.6 LBS | DIASTOLIC BLOOD PRESSURE: 74 MMHG | OXYGEN SATURATION: 97 % | BODY MASS INDEX: 25.03 KG/M2 | SYSTOLIC BLOOD PRESSURE: 124 MMHG | HEART RATE: 64 BPM

## 2018-08-20 DIAGNOSIS — R94.39 ABNORMAL CARDIOVASCULAR STRESS TEST: ICD-10-CM

## 2018-08-20 DIAGNOSIS — I70.90 ARTERIOSCLEROTIC VASCULAR DISEASE: ICD-10-CM

## 2018-08-20 PROCEDURE — 99213 OFFICE O/P EST LOW 20 MIN: CPT | Performed by: INTERNAL MEDICINE

## 2018-08-20 RX ORDER — SIMVASTATIN 10 MG
10 TABLET ORAL AT BEDTIME
Qty: 93 TABLET | Refills: 3 | Status: SHIPPED | OUTPATIENT
Start: 2018-08-20 | End: 2020-01-01

## 2018-08-20 NOTE — MR AVS SNAPSHOT
"              After Visit Summary   8/20/2018    Chadwick Aguilar    MRN: [id number]           Patient Information     Date Of Birth          1945        Visit Information        Provider Department      8/20/2018 10:30 AM Sheron Berry MD [facility]        Today's Diagnoses     Abnormal cardiovascular stress test        Arteriosclerotic vascular disease           Follow-ups after your visit        Additional Services     Follow-Up with Cardiologist                 Future tests that were ordered for you today     Open Future Orders        Priority Expected Expires Ordered    Follow-Up with Cardiologist Routine 9/19/2018 8/20/2019 8/20/2018    Lipid Profile Routine 11/19/2018 8/20/2019 8/20/2018    ALT Routine 9/19/2018 8/20/2019 8/20/2018            Who to contact     If you have questions or need follow up information about today's clinic visit or your schedule please contact [facility] directly at [phone].  Normal or non-critical lab and imaging results will be communicated to you by Amitivehart, letter or phone within 4 business days after the clinic has received the results. If you do not hear from us within 7 days, please contact the clinic through Amitivehart or phone. If you have a critical or abnormal lab result, we will notify you by phone as soon as possible.  Submit refill requests through [employer] or call your pharmacy and they will forward the refill request to us. Please allow 3 business days for your refill to be completed.          Additional Information About Your Visit        Amitivehart Information     [employer] lets you send messages to your doctor, view your test results, renew your prescriptions, schedule appointments and more. To sign up, go to [email or web address] . Click on \"Log in\" on the left side of the screen, which will take you to the Welcome page. Then click on \"Sign up Now\" on the right side of the " page.     You will be asked to enter the access code listed below, as well as some personal information. Please follow the directions to create your username and password.     Your access code is: [email or web address]  Expires: 2018  8:35 AM     Your access code will  in 90 days. If you need help or a new code, please call your [place] clinic or [phone].        Care EveryWhere ID     This is your Care EveryWhere ID. This could be used by other organizations to access your [facility] medical records  [id number]        Your Vitals Were     Pulse Pulse Oximetry BMI (Body Mass Index)             64 97% 25.03 kg/m2          Blood Pressure from Last 3 Encounters:   18 157/87   08/10/18 132/70   18 135/78    Weight from Last 3 Encounters:   18 71.9 kg (158 lb 9.6 oz)   08/10/18 70 kg (154 lb 6.4 oz)   18 71.2 kg (157 lb)              We Performed the Following     Follow-Up with Cardiologist          Today's Medication Changes          These changes are accurate as of 18 10:45 AM.  If you have any questions, ask your nurse or doctor.               These medicines have changed or have updated prescriptions.        Dose/Directions    citalopram 20 MG tablet   Commonly known as:  celeXA   This may have changed:  Another medication with the same name was removed. Continue taking this medication, and follow the directions you see here.   Used for:  Dementia without behavioral disturbance, unspecified dementia type   Changed by:  Sheron Berry MD        Dose:  20 mg   Take 1 tablet (20 mg) by mouth daily   Quantity:  90 tablet   Refills:  3       simvastatin 10 MG tablet   Commonly known as:  ZOCOR   This may have changed:    - medication strength  - how much to take  - Another medication with the same name was removed. Continue taking this medication, and follow the directions you see here.   Used for:  Arteriosclerotic vascular disease   Changed by:  Sheron Berry MD        Dose:  10  mg   Take 1 tablet (10 mg) by mouth At Bedtime   Quantity:  93 tablet   Refills:  3            Where to get your medicines      These medications were sent to [facility] [place] - [place], [place] - [address] [facility]  [address] [facility] [address]     Phone:  [phone]     simvastatin 10 MG tablet                Primary Care Provider Office Phone # Fax #    Jessi Danni Hills [phone] [address]  [facility] [address]        Equal Access to Services     [facility]: Hadii aad ku hadasho Soomaali, waaxda luqadaha, qaybta kaalmada adeegyada, chloe santamaria hayaan bridget carrillo . So [facility] [phone].    ATENCIÓN: Si habla español, tiene a tamez disposición servicios gratuitos de asistencia lingüística. [facility] [phone].    We comply with applicable federal civil rights laws and [place] laws. We do not discriminate on the basis of race, color, national origin, age, disability, sex, sexual orientation, or gender identity.            Thank you!     Thank you for choosing [facility]  for your care. Our goal is always to provide you with excellent care. Hearing back from our patients is one way we can continue to improve our services. Please take a few minutes to complete the written survey that you may receive in the mail after your visit with us. Thank you!             Your Updated Medication List - Protect others around you: Learn how to safely use, store and throw away your medicines at www.disposemymeds.org.          This list is accurate as of 8/20/18 10:45 AM.  Always use your most recent med list.                   Brand Name Dispense Instructions for use Diagnosis    albuterol (2.5 MG/3ML) 0.083% neb solution     25 vial    Take 1 vial (2.5 mg) by nebulization every 6 hours as needed for shortness of breath / dyspnea or wheezing    Chronic obstructive pulmonary disease, unspecified  COPD type (H)       aspirin 81 MG chewable tablet     108 tablet    Take 1 tablet (81 mg) by mouth daily    Abnormal nuclear cardiac imaging test       citalopram 20 MG tablet    celeXA    90 tablet    Take 1 tablet (20 mg) by mouth daily    Dementia without behavioral disturbance, unspecified dementia type       clopidogrel 75 MG tablet    PLAVIX    90 tablet    Take 1 tablet (75 mg) by mouth daily    PVD (peripheral vascular disease) (H)       fluticasone-salmeterol 250-50 MCG/DOSE diskus inhaler    ADVAIR DISKUS    60 Inhaler    Inhale 1 puff into the lungs 2 times daily    Chronic obstructive pulmonary disease, unspecified COPD type (H)       lisinopril 40 MG tablet    PRINIVIL/ZESTRIL    90 tablet    Take 1 tablet (40 mg) by mouth daily    Essential hypertension, benign       memantine 10 MG tablet    NAMENDA    180 tablet    Take 1 tablet (10 mg) by mouth 2 times daily    Dementia without behavioral disturbance, unspecified dementia type       metoprolol succinate 100 MG 24 hr tablet    TOPROL-XL    90 tablet    Take 1 tablet (100 mg) by mouth daily    Essential hypertension, benign       MULTIVITAMIN ADULT PO           nitroGLYcerin 0.4 MG sublingual tablet    NITROSTAT    25 tablet    For chest pain place 1 tablet under the tongue every 5 minutes for 3 doses. If symptoms persist 5 minutes after 1st dose call 911.    Abnormal nuclear cardiac imaging test       * order for DME     1 each    Equipment being ordered: shower chair    Chronic obstructive pulmonary disease, unspecified COPD type (H), Exertional dyspnea       * order for DME     1 each    Equipment being ordered: Portable oxygen concentrator with O2 @ 2L NC PRN shortnes of breath.    Chronic obstructive pulmonary disease, unspecified COPD type (H)       simvastatin 10 MG tablet    ZOCOR    93 tablet    Take 1 tablet (10 mg) by mouth At Bedtime    Arteriosclerotic vascular disease       tiotropium 18 MCG capsule    SPIRIVA HANDIHALER    90 capsule     Inhale 1 capsule (18 mcg) into the lungs daily Inhale contents of one capsule daily.    Chronic obstructive pulmonary disease, unspecified COPD type (H)       VITAMIN B-1 PO      Take 250 mg by mouth daily        * Notice:  This list has 2 medication(s) that are the same as other medications prescribed for you. Read the directions carefully, and ask your doctor or other care provider to review them with you.

## 2018-08-20 NOTE — LETTER
8/20/2018    Jessi Hills MD  [address]  [facility] [address]    RE: Chadwick Mccormackcher       Dear Colleague,    I had the pleasure of seeing Chadwick Aguilar in the [facility].    HPI and Plan:      Mr. Chadwick Aguilar was seen in follow-up at [facility] in [place]. He is 73 years old and was accompanied by his wife.    He was previously referred by Dr. Hills. He has significant chronic obstructive pulmonary disease and was becoming increasingly dyspneic. He underwent a Lexiscan stress nuclear study which demonstrated a reversible mid and distal inferior defect. In late 2016, he was seen in [place], [place] for worsening dyspnea. CXR demonstrated pneumonia but there was concern for an ST elevation myocardial infarct. Helicopter transport was made to [facility]. Coronary angiography demonstrated a 10% stenosis in the LAD and a 40% stenosis in the right coronary artery. No further cardiology follow-up was recommended.     He has been demonstrated to have normal left ventricular systolic performance.    He and his wife now live in the [facility] where they are surrounded by family. Mrs. Aguilar noticed that his breathing improved markedly with the oxygen and bronchodilators. He returns for follow-up on nasal O2 and denies dyspnea, chest discomfort or angina.    Exam:  On physical examination, this is a man in no apparent distress with O2 by nasal cannula.   He was alert and oriented to person place and time.  BP was 124/74 mmHg and the heart rate 64 beats per minute and regular; respiratory rate is approximately 16-20/m.  The chest revealed a diffuse decreased breath sounds with bilateral low frequency expiratory wheezing. Cardiac auscultation, there is S1 and S2 without extra sounds or murmur.    Assessment/Plan:  In assessment, Mr. Martin is completely asymptomatic without any anginal symptoms. He continues to carry nitroglycerin. He  is on aspirin, statin therapy and beta blockade as well as ace inhibition. His LDL remains mildly higher than goal so I recommended an increase in simvistatin to 10 mg nightly with an ALT/FLP in 3 months.    In the absence of symptoms, I did not and still do not recommend coronary angiography. I discussed again with  and Mrs. Aguilar. He understands that in the absence of symptoms,  there is not currently an indication. He is on good medical therapy.  We also discussed that stent placement does not prevent myocardial infarcts.  He will return in one year unless he has earlier symptoms and repeat stress testing can be discussed at that time.    Orders Placed This Encounter   Procedures     Lipid Profile     ALT     Follow-Up with Cardiologist       Orders Placed This Encounter   Medications     simvastatin (ZOCOR) 10 MG tablet     Sig: Take 1 tablet (10 mg) by mouth At Bedtime     Dispense:  93 tablet     Refill:  3       Medications Discontinued During This Encounter   Medication Reason     citalopram (CELEXA) 20 MG tablet      simvastatin (ZOCOR) 5 MG tablet      simvastatin (ZOCOR) 5 MG tablet Reorder     CURRENT MEDICATIONS:  Current Outpatient Prescriptions   Medication Sig Dispense Refill     albuterol (2.5 MG/3ML) 0.083% neb solution Take 1 vial (2.5 mg) by nebulization every 6 hours as needed for shortness of breath / dyspnea or wheezing 25 vial 11     aspirin 81 MG chewable tablet Take 1 tablet (81 mg) by mouth daily 108 tablet 3     citalopram (CELEXA) 20 MG tablet Take 1 tablet (20 mg) by mouth daily 90 tablet 3     clopidogrel (PLAVIX) 75 MG tablet Take 1 tablet (75 mg) by mouth daily 90 tablet 3     fluticasone-salmeterol (ADVAIR DISKUS) 250-50 MCG/DOSE diskus inhaler Inhale 1 puff into the lungs 2 times daily 60 Inhaler 11     lisinopril (PRINIVIL/ZESTRIL) 40 MG tablet Take 1 tablet (40 mg) by mouth daily 90 tablet 3     memantine (NAMENDA) 10 MG tablet Take 1 tablet (10 mg) by mouth 2 times daily  180 tablet 3     metoprolol succinate (TOPROL-XL) 100 MG 24 hr tablet Take 1 tablet (100 mg) by mouth daily 90 tablet 3     Multiple Vitamins-Minerals (MULTIVITAMIN ADULT PO)        nitroGLYcerin (NITROSTAT) 0.4 MG sublingual tablet For chest pain place 1 tablet under the tongue every 5 minutes for 3 doses. If symptoms persist 5 minutes after 1st dose call 911. 25 tablet 0     order for DME Equipment being ordered: Portable oxygen concentrator with O2 @ 2L NC PRN shortnes of breath. 1 each 0     order for DME Equipment being ordered: shower chair 1 each 0     simvastatin (ZOCOR) 10 MG tablet Take 1 tablet (10 mg) by mouth At Bedtime 93 tablet 3     Thiamine HCl (VITAMIN B-1 PO) Take 250 mg by mouth daily        tiotropium (SPIRIVA HANDIHALER) 18 MCG capsule Inhale 1 capsule (18 mcg) into the lungs daily Inhale contents of one capsule daily. 90 capsule 3     [DISCONTINUED] citalopram (CELEXA) 20 MG tablet TAKE ONE TABLET BY MOUTH EVERY DAY 30 tablet 0     [DISCONTINUED] simvastatin (ZOCOR) 5 MG tablet Take 1 tablet (5 mg) by mouth At Bedtime 90 tablet 3     [DISCONTINUED] simvastatin (ZOCOR) 5 MG tablet TAKE ONE TABLET BY MOUTH AT BEDTIME 30 tablet 0       ALLERGIES   No Known Allergies    PAST MEDICAL HISTORY:  No past medical history on file.    PAST SURGICAL HISTORY:  No past surgical history on file.    FAMILY HISTORY:  No family history on file.    SOCIAL HISTORY:  Social History     Social History     Marital status:      Spouse name: N/A     Number of children: N/A     Years of education: N/A     Social History Main Topics     Smoking status: Former Smoker     Years: 50.00     Types: Cigarettes     Quit date: 12/23/2016     Smokeless tobacco: Never Used      Comment: uses patches PRN ? 1 a day occassionally     Alcohol use Yes      Comment: occassionally     Drug use: No     Sexual activity: Not Asked     Other Topics Concern     None     Social History Narrative       Review of Systems:  Skin:   Positive for bruising     Eyes:  Positive for glasses    ENT:  Negative      Respiratory:  Positive for shortness of breath COPD   Cardiovascular:  Negative for;chest pain;palpitations;edema;syncope or near-syncope;lightheadedness;dizziness Positive for;fatigue    Gastroenterology: Negative      Genitourinary:  Negative      Musculoskeletal:  Negative      Neurologic:  Positive for memory problems;numbness or tingling of hands;headaches    Psychiatric:  Positive for anxiety    Heme/Lymph/Imm:  Negative      Endocrine:  Negative        Physical Exam:  Vitals: /87  Pulse 64  Wt 71.9 kg (158 lb 9.6 oz)  SpO2 97%  BMI 25.03 kg/m2    Constitutional:  cooperative, alert and oriented, well developed, well nourished, in no acute distress   O2 by nasal cannula    Skin:  warm and dry to the touch          Head:  normocephalic        Eyes:  sclera white        Lymph:      ENT:           Neck:           Respiratory:  normal respiratory excursion    diffuse and moderate decrease in BS with diffuse bilateral expiratory low frequency wheezes    Cardiac: regular rhythm, normal S1/S2, no S3 or S4, apical impulse not displaced, no murmurs, gallops or rubs                                                         GI:           Extremities and Muscular Skeletal:  no deformities, clubbing, cyanosis, erythema observed;no edema              Neurological:  no gross motor deficits;affect appropriate        Psych:  Alert and Oriented x 3;affect appropriate, oriented to time, person and place        Thank you for allowing me to participate in the care of your patient.    Sincerely,     Sheron Berry MD     [facility]

## 2018-08-20 NOTE — LETTER
8/20/2018    Jessi Hills MD  [address]  [facility] [address]    RE: Chadwick Mccormackcher       Dear Colleague,    I had the pleasure of seeing Chadwick Aguilar in the [facility].    HPI and Plan:      Mr. Chadwick Aguilar was seen in follow-up at [facility] in [place]. He is 73 years old and was accompanied by his wife.    He was previously referred by Dr. Hills. He has significant chronic obstructive pulmonary disease and was becoming increasingly dyspneic. He underwent a Lexiscan stress nuclear study which demonstrated a reversible mid and distal inferior defect. In late 2016, he was seen in [place], [place] for worsening dyspnea. CXR demonstrated pneumonia but there was concern for an ST elevation myocardial infarct. Helicopter transport was made to [facility]. Coronary angiography demonstrated a 10% stenosis in the LAD and a 40% stenosis in the right coronary artery. No further cardiology follow-up was recommended.     He has been demonstrated to have normal left ventricular systolic performance.    He and his wife now live in the [facility] where they are surrounded by family. Mrs. Aguilar noticed that his breathing improved markedly with the oxygen and bronchodilators. He returns for follow-up on nasal O2 and denies dyspnea, chest discomfort or angina.    Exam:  On physical examination, this is a man in no apparent distress with O2 by nasal cannula.   He was alert and oriented to person place and time.  BP was 124/74 mmHg and the heart rate 64 beats per minute and regular; respiratory rate is approximately 16-20/m.  The chest revealed a diffuse decreased breath sounds with bilateral low frequency expiratory wheezing. Cardiac auscultation, there is S1 and S2 without extra sounds or murmur.    Assessment/Plan:  In assessment, Mr. Martin is completely asymptomatic without any anginal symptoms. He continues to carry nitroglycerin. He  is on aspirin, statin therapy and beta blockade as well as ace inhibition. His LDL remains mildly higher than goal so I recommended an increase in simvistatin to 10 mg nightly with an ALT/FLP in 3 months.    In the absence of symptoms, I did not and still do not recommend coronary angiography. I discussed again with  and Mrs. Aguilar. He understands that in the absence of symptoms,  there is not currently an indication. He is on good medical therapy.  We also discussed that stent placement does not prevent myocardial infarcts.  He will return in one year unless he has earlier symptoms and repeat stress testing can be discussed at that time.    Orders Placed This Encounter   Procedures     Lipid Profile     ALT     Follow-Up with Cardiologist       Orders Placed This Encounter   Medications     simvastatin (ZOCOR) 10 MG tablet     Sig: Take 1 tablet (10 mg) by mouth At Bedtime     Dispense:  93 tablet     Refill:  3       Medications Discontinued During This Encounter   Medication Reason     citalopram (CELEXA) 20 MG tablet      simvastatin (ZOCOR) 5 MG tablet      simvastatin (ZOCOR) 5 MG tablet Reorder     CURRENT MEDICATIONS:  Current Outpatient Prescriptions   Medication Sig Dispense Refill     albuterol (2.5 MG/3ML) 0.083% neb solution Take 1 vial (2.5 mg) by nebulization every 6 hours as needed for shortness of breath / dyspnea or wheezing 25 vial 11     aspirin 81 MG chewable tablet Take 1 tablet (81 mg) by mouth daily 108 tablet 3     citalopram (CELEXA) 20 MG tablet Take 1 tablet (20 mg) by mouth daily 90 tablet 3     clopidogrel (PLAVIX) 75 MG tablet Take 1 tablet (75 mg) by mouth daily 90 tablet 3     fluticasone-salmeterol (ADVAIR DISKUS) 250-50 MCG/DOSE diskus inhaler Inhale 1 puff into the lungs 2 times daily 60 Inhaler 11     lisinopril (PRINIVIL/ZESTRIL) 40 MG tablet Take 1 tablet (40 mg) by mouth daily 90 tablet 3     memantine (NAMENDA) 10 MG tablet Take 1 tablet (10 mg) by mouth 2 times daily  180 tablet 3     metoprolol succinate (TOPROL-XL) 100 MG 24 hr tablet Take 1 tablet (100 mg) by mouth daily 90 tablet 3     Multiple Vitamins-Minerals (MULTIVITAMIN ADULT PO)        nitroGLYcerin (NITROSTAT) 0.4 MG sublingual tablet For chest pain place 1 tablet under the tongue every 5 minutes for 3 doses. If symptoms persist 5 minutes after 1st dose call 911. 25 tablet 0     order for DME Equipment being ordered: Portable oxygen concentrator with O2 @ 2L NC PRN shortnes of breath. 1 each 0     order for DME Equipment being ordered: shower chair 1 each 0     simvastatin (ZOCOR) 10 MG tablet Take 1 tablet (10 mg) by mouth At Bedtime 93 tablet 3     Thiamine HCl (VITAMIN B-1 PO) Take 250 mg by mouth daily        tiotropium (SPIRIVA HANDIHALER) 18 MCG capsule Inhale 1 capsule (18 mcg) into the lungs daily Inhale contents of one capsule daily. 90 capsule 3     [DISCONTINUED] citalopram (CELEXA) 20 MG tablet TAKE ONE TABLET BY MOUTH EVERY DAY 30 tablet 0     [DISCONTINUED] simvastatin (ZOCOR) 5 MG tablet Take 1 tablet (5 mg) by mouth At Bedtime 90 tablet 3     [DISCONTINUED] simvastatin (ZOCOR) 5 MG tablet TAKE ONE TABLET BY MOUTH AT BEDTIME 30 tablet 0       ALLERGIES   No Known Allergies    PAST MEDICAL HISTORY:  No past medical history on file.    PAST SURGICAL HISTORY:  No past surgical history on file.    FAMILY HISTORY:  No family history on file.    SOCIAL HISTORY:  Social History     Social History     Marital status:      Spouse name: N/A     Number of children: N/A     Years of education: N/A     Social History Main Topics     Smoking status: Former Smoker     Years: 50.00     Types: Cigarettes     Quit date: 12/23/2016     Smokeless tobacco: Never Used      Comment: uses patches PRN ? 1 a day occassionally     Alcohol use Yes      Comment: occassionally     Drug use: No     Sexual activity: Not Asked     Other Topics Concern     None     Social History Narrative       Review of Systems:  Skin:   Positive for bruising     Eyes:  Positive for glasses    ENT:  Negative      Respiratory:  Positive for shortness of breath COPD   Cardiovascular:  Negative for;chest pain;palpitations;edema;syncope or near-syncope;lightheadedness;dizziness Positive for;fatigue    Gastroenterology: Negative      Genitourinary:  Negative      Musculoskeletal:  Negative      Neurologic:  Positive for memory problems;numbness or tingling of hands;headaches    Psychiatric:  Positive for anxiety    Heme/Lymph/Imm:  Negative      Endocrine:  Negative        Physical Exam:  Vitals: /87  Pulse 64  Wt 71.9 kg (158 lb 9.6 oz)  SpO2 97%  BMI 25.03 kg/m2    Constitutional:  cooperative, alert and oriented, well developed, well nourished, in no acute distress   O2 by nasal cannula    Skin:  warm and dry to the touch          Head:  normocephalic        Eyes:  sclera white        Lymph:      ENT:           Neck:           Respiratory:  normal respiratory excursion    diffuse and moderate decrease in BS with diffuse bilateral expiratory low frequency wheezes    Cardiac: regular rhythm, normal S1/S2, no S3 or S4, apical impulse not displaced, no murmurs, gallops or rubs                                                         GI:           Extremities and Muscular Skeletal:  no deformities, clubbing, cyanosis, erythema observed;no edema              Neurological:  no gross motor deficits;affect appropriate        Psych:  Alert and Oriented x 3;affect appropriate, oriented to time, person and place          CC  Sheron Berry MD  [address]  [place] [address]                    Thank you for allowing me to participate in the care of your patient.      Sincerely,     Sheron Berry MD     [facility]    cc:   Sheron Berry MD  [id number]  [place] [id number]

## 2018-08-21 NOTE — PROGRESS NOTES
HPI and Plan:      Mr. Chadwick Aguilar was seen in follow-up at [facility] in [place]. He is 73 years old and was accompanied by his wife.    He was previously referred by Dr. Hills. He has significant chronic obstructive pulmonary disease and was becoming increasingly dyspneic. He underwent a Lexiscan stress nuclear study which demonstrated a reversible mid and distal inferior defect. In late 2016, he was seen in [place], [place] for worsening dyspnea. CXR demonstrated pneumonia but there was concern for an ST elevation myocardial infarct. Helicopter transport was made to [facility]. Coronary angiography demonstrated a 10% stenosis in the LAD and a 40% stenosis in the right coronary artery. No further cardiology follow-up was recommended.     He has been demonstrated to have normal left ventricular systolic performance.    He and his wife now live in the [facility] where they are surrounded by family. Mrs. Aguilar noticed that his breathing improved markedly with the oxygen and bronchodilators. He returns for follow-up on nasal O2 and denies dyspnea, chest discomfort or angina.    Exam:  On physical examination, this is a man in no apparent distress with O2 by nasal cannula.   He was alert and oriented to person place and time.  BP was 124/74 mmHg and the heart rate 64 beats per minute and regular; respiratory rate is approximately 16-20/m.  The chest revealed a diffuse decreased breath sounds with bilateral low frequency expiratory wheezing. Cardiac auscultation, there is S1 and S2 without extra sounds or murmur.    Assessment/Plan:  In assessment, Mr. Martin is completely asymptomatic without any anginal symptoms. He continues to carry nitroglycerin. He is on aspirin, statin therapy and beta blockade as well as ace inhibition. His LDL remains mildly higher than goal so I recommended an increase in simvistatin to 10 mg nightly with an ALT/FLP in 3 months.    In the absence of  symptoms, I did not and still do not recommend coronary angiography. I discussed again with MrCindy and Mrs. Aguilar. He understands that in the absence of symptoms,  there is not currently an indication. He is on good medical therapy.  We also discussed that stent placement does not prevent myocardial infarcts.  He will return in one year unless he has earlier symptoms and repeat stress testing can be discussed at that time.    Orders Placed This Encounter   Procedures     Lipid Profile     ALT     Follow-Up with Cardiologist       Orders Placed This Encounter   Medications     simvastatin (ZOCOR) 10 MG tablet     Sig: Take 1 tablet (10 mg) by mouth At Bedtime     Dispense:  93 tablet     Refill:  3       Medications Discontinued During This Encounter   Medication Reason     citalopram (CELEXA) 20 MG tablet      simvastatin (ZOCOR) 5 MG tablet      simvastatin (ZOCOR) 5 MG tablet Reorder     CURRENT MEDICATIONS:  Current Outpatient Prescriptions   Medication Sig Dispense Refill     albuterol (2.5 MG/3ML) 0.083% neb solution Take 1 vial (2.5 mg) by nebulization every 6 hours as needed for shortness of breath / dyspnea or wheezing 25 vial 11     aspirin 81 MG chewable tablet Take 1 tablet (81 mg) by mouth daily 108 tablet 3     citalopram (CELEXA) 20 MG tablet Take 1 tablet (20 mg) by mouth daily 90 tablet 3     clopidogrel (PLAVIX) 75 MG tablet Take 1 tablet (75 mg) by mouth daily 90 tablet 3     fluticasone-salmeterol (ADVAIR DISKUS) 250-50 MCG/DOSE diskus inhaler Inhale 1 puff into the lungs 2 times daily 60 Inhaler 11     lisinopril (PRINIVIL/ZESTRIL) 40 MG tablet Take 1 tablet (40 mg) by mouth daily 90 tablet 3     memantine (NAMENDA) 10 MG tablet Take 1 tablet (10 mg) by mouth 2 times daily 180 tablet 3     metoprolol succinate (TOPROL-XL) 100 MG 24 hr tablet Take 1 tablet (100 mg) by mouth daily 90 tablet 3     Multiple Vitamins-Minerals (MULTIVITAMIN ADULT PO)        nitroGLYcerin (NITROSTAT) 0.4 MG sublingual  tablet For chest pain place 1 tablet under the tongue every 5 minutes for 3 doses. If symptoms persist 5 minutes after 1st dose call 911. 25 tablet 0     order for DME Equipment being ordered: Portable oxygen concentrator with O2 @ 2L NC PRN shortnes of breath. 1 each 0     order for DME Equipment being ordered: shower chair 1 each 0     simvastatin (ZOCOR) 10 MG tablet Take 1 tablet (10 mg) by mouth At Bedtime 93 tablet 3     Thiamine HCl (VITAMIN B-1 PO) Take 250 mg by mouth daily        tiotropium (SPIRIVA HANDIHALER) 18 MCG capsule Inhale 1 capsule (18 mcg) into the lungs daily Inhale contents of one capsule daily. 90 capsule 3     [DISCONTINUED] citalopram (CELEXA) 20 MG tablet TAKE ONE TABLET BY MOUTH EVERY DAY 30 tablet 0     [DISCONTINUED] simvastatin (ZOCOR) 5 MG tablet Take 1 tablet (5 mg) by mouth At Bedtime 90 tablet 3     [DISCONTINUED] simvastatin (ZOCOR) 5 MG tablet TAKE ONE TABLET BY MOUTH AT BEDTIME 30 tablet 0       ALLERGIES   No Known Allergies    PAST MEDICAL HISTORY:  No past medical history on file.    PAST SURGICAL HISTORY:  No past surgical history on file.    FAMILY HISTORY:  No family history on file.    SOCIAL HISTORY:  Social History     Social History     Marital status:      Spouse name: N/A     Number of children: N/A     Years of education: N/A     Social History Main Topics     Smoking status: Former Smoker     Years: 50.00     Types: Cigarettes     Quit date: 12/23/2016     Smokeless tobacco: Never Used      Comment: uses patches PRN ? 1 a day occassionally     Alcohol use Yes      Comment: occassionally     Drug use: No     Sexual activity: Not Asked     Other Topics Concern     None     Social History Narrative       Review of Systems:  Skin:  Positive for bruising     Eyes:  Positive for glasses    ENT:  Negative      Respiratory:  Positive for shortness of breath COPD   Cardiovascular:  Negative for;chest pain;palpitations;edema;syncope or  near-syncope;lightheadedness;dizziness Positive for;fatigue    Gastroenterology: Negative      Genitourinary:  Negative      Musculoskeletal:  Negative      Neurologic:  Positive for memory problems;numbness or tingling of hands;headaches    Psychiatric:  Positive for anxiety    Heme/Lymph/Imm:  Negative      Endocrine:  Negative        Physical Exam:  Vitals: /87  Pulse 64  Wt 71.9 kg (158 lb 9.6 oz)  SpO2 97%  BMI 25.03 kg/m2    Constitutional:  cooperative, alert and oriented, well developed, well nourished, in no acute distress   O2 by nasal cannula    Skin:  warm and dry to the touch          Head:  normocephalic        Eyes:  sclera white        Lymph:      ENT:           Neck:           Respiratory:  normal respiratory excursion    diffuse and moderate decrease in BS with diffuse bilateral expiratory low frequency wheezes    Cardiac: regular rhythm, normal S1/S2, no S3 or S4, apical impulse not displaced, no murmurs, gallops or rubs                                                         GI:           Extremities and Muscular Skeletal:  no deformities, clubbing, cyanosis, erythema observed;no edema              Neurological:  no gross motor deficits;affect appropriate        Psych:  Alert and Oriented x 3;affect appropriate, oriented to time, person and place          CC  Sheron Berry MD  [address]  [place] [id number]

## 2018-09-11 ENCOUNTER — ALLIED HEALTH/NURSE VISIT (OUTPATIENT)
Dept: FAMILY MEDICINE | Facility: CLINIC | Age: 73
End: 2018-09-11
Payer: COMMERCIAL

## 2018-09-11 DIAGNOSIS — Z23 NEED FOR PROPHYLACTIC VACCINATION AND INOCULATION AGAINST INFLUENZA: Primary | ICD-10-CM

## 2018-09-11 PROCEDURE — G0008 ADMIN INFLUENZA VIRUS VAC: HCPCS

## 2018-09-11 PROCEDURE — 90662 IIV NO PRSV INCREASED AG IM: CPT

## 2018-09-11 NOTE — MR AVS SNAPSHOT
After Visit Summary   9/11/2018    Chadwick Aguilar    MRN: [id number]           Patient Information     Date Of Birth          1945        Visit Information        Provider Department      9/11/2018 10:00 AM Suhas/Lilliana Diop [facility]        Today's Diagnoses     Need for prophylactic vaccination and inoculation against influenza    -  1       Follow-ups after your visit        Who to contact     If you have questions or need follow up information about today's clinic visit or your schedule please contact [facility] directly at [phone].  Normal or non-critical lab and imaging results will be communicated to you by MyChart, letter or phone within 4 business days after the clinic has received the results. If you do not hear from us within 7 days, please contact the clinic through MyChart or phone. If you have a critical or abnormal lab result, we will notify you by phone as soon as possible.  Submit refill requests through [facility] or call your pharmacy and they will forward the refill request to us. Please allow 3 business days for your refill to be completed.          Additional Information About Your Visit        Care EveryWhere ID     This is your Care EveryWhere ID. This could be used by other organizations to access your [facility] medical records  [id number]         Blood Pressure from Last 3 Encounters:   08/20/18 124/74   08/10/18 132/70   01/23/18 135/78    Weight from Last 3 Encounters:   08/20/18 158 lb 9.6 oz (71.9 kg)   08/10/18 154 lb 6.4 oz (70 kg)   01/23/18 157 lb (71.2 kg)              We Performed the Following     ADMIN INFLUENZA (For MEDICARE Patients ONLY) []     FLU VACCINE, INCREASED ANTIGEN, PRESV FREE, AGE 65+ [13639]        Primary Care Provider Office Phone # Fax #    Jessi Hills [phone] [address]  [facility] [address]        Equal Access to Services     HEBERT LESLIE AH: Erika  lisa Prater, waflaquitada adalidadaha, qaybta kaania cortez, waxclarita hina zapieneverettdona carrillo mauricio. So [facility] [phone].    ATENCIÓN: Si habla jeremías, tiene a tamez disposición servicios gratuitos de asistencia lingüística. Kwame al [phone].    We comply with applicable federal civil rights laws and [place] laws. We do not discriminate on the basis of race, color, national origin, age, disability, sex, sexual orientation, or gender identity.            Thank you!     Thank you for choosing [facility]  for your care. Our goal is always to provide you with excellent care. Hearing back from our patients is one way we can continue to improve our services. Please take a few minutes to complete the written survey that you may receive in the mail after your visit with us. Thank you!             Your Updated Medication List - Protect others around you: Learn how to safely use, store and throw away your medicines at www.disposemymeds.org.          This list is accurate as of 9/11/18 10:08 AM.  Always use your most recent med list.                   Brand Name Dispense Instructions for use Diagnosis    albuterol (2.5 MG/3ML) 0.083% neb solution     25 vial    Take 1 vial (2.5 mg) by nebulization every 6 hours as needed for shortness of breath / dyspnea or wheezing    Chronic obstructive pulmonary disease, unspecified COPD type (H)       aspirin 81 MG chewable tablet     108 tablet    Take 1 tablet (81 mg) by mouth daily    Abnormal nuclear cardiac imaging test       citalopram 20 MG tablet    celeXA    90 tablet    Take 1 tablet (20 mg) by mouth daily    Dementia without behavioral disturbance, unspecified dementia type       clopidogrel 75 MG tablet    PLAVIX    90 tablet    Take 1 tablet (75 mg) by mouth daily    PVD (peripheral vascular disease) (H)       fluticasone-salmeterol 250-50 MCG/DOSE diskus inhaler    ADVAIR DISKUS    60 Inhaler    Inhale 1 puff into the lungs 2 times daily     Chronic obstructive pulmonary disease, unspecified COPD type (H)       lisinopril 40 MG tablet    PRINIVIL/ZESTRIL    90 tablet    Take 1 tablet (40 mg) by mouth daily    Essential hypertension, benign       memantine 10 MG tablet    NAMENDA    180 tablet    Take 1 tablet (10 mg) by mouth 2 times daily    Dementia without behavioral disturbance, unspecified dementia type       metoprolol succinate 100 MG 24 hr tablet    TOPROL-XL    90 tablet    Take 1 tablet (100 mg) by mouth daily    Essential hypertension, benign       MULTIVITAMIN ADULT PO           nitroGLYcerin 0.4 MG sublingual tablet    NITROSTAT    25 tablet    For chest pain place 1 tablet under the tongue every 5 minutes for 3 doses. If symptoms persist 5 minutes after 1st dose call 911.    Abnormal nuclear cardiac imaging test       * order for DME     1 each    Equipment being ordered: shower chair    Chronic obstructive pulmonary disease, unspecified COPD type (H), Exertional dyspnea       * order for DME     1 each    Equipment being ordered: Portable oxygen concentrator with O2 @ 2L NC PRN shortnes of breath.    Chronic obstructive pulmonary disease, unspecified COPD type (H)       simvastatin 10 MG tablet    ZOCOR    93 tablet    Take 1 tablet (10 mg) by mouth At Bedtime    Arteriosclerotic vascular disease       tiotropium 18 MCG capsule    SPIRIVA HANDIHALER    90 capsule    Inhale 1 capsule (18 mcg) into the lungs daily Inhale contents of one capsule daily.    Chronic obstructive pulmonary disease, unspecified COPD type (H)       VITAMIN B-1 PO      Take 250 mg by mouth daily        * Notice:  This list has 2 medication(s) that are the same as other medications prescribed for you. Read the directions carefully, and ask your doctor or other care provider to review them with you.

## 2018-09-11 NOTE — NURSING NOTE
Due to injection administration, patient instructed to remain in clinic for 15 minutes  afterwards, and to report any adverse reaction to me immediately.

## 2018-09-11 NOTE — PROGRESS NOTES

## 2018-09-13 PROCEDURE — 82274 ASSAY TEST FOR BLOOD FECAL: CPT | Performed by: FAMILY MEDICINE

## 2018-09-16 LAB — HEMOCCULT STL QL IA: NEGATIVE

## 2018-09-17 DIAGNOSIS — Z12.11 SPECIAL SCREENING FOR MALIGNANT NEOPLASMS, COLON: ICD-10-CM

## 2018-09-17 NOTE — LETTER
[facility]  [address]  [facility] [address]  Phone: [phone]      9/17/2018     Chadwick Aguilar  [address] [place] [address]      Dear Chadwick:    Thank you for allowing me to participate in your care. Your recent test results were reviewed and listed below.      This is a normal result.  If you continue with FIT testing for colon cancer screening you need to repeat this test yearly.  You can opt for colonoscopy screening at any point which would be every 10 years if normal and no family history of colon cancer  Results for orders placed or performed in visit on 09/17/18   Fecal colorectal cancer screen (FIT)   Result Value Ref Range    Occult Blood Scn FIT Negative NEG^Negative         Thank you for choosing [facility]. As a result, please continue with the treatment plan discussed in the office. Return as discussed or sooner if symptoms worsen or fail to improve.     If you have any further questions or concerns, please do not hesitate to contact us.      Sincerely,        Dr. Jessi Hills/Santa Go MA

## 2018-09-17 NOTE — PROGRESS NOTES
Please mail letter: This is a normal result.  If you continue with FIT testing for colon cancer screening you need to repeat this test yearly.  You can opt for colonoscopy screening at any point which would be every 10 years if normal and no family history of colon cancer.

## 2018-11-09 ENCOUNTER — TELEPHONE (OUTPATIENT)
Dept: FAMILY MEDICINE | Facility: CLINIC | Age: 73
End: 2018-11-09

## 2018-11-15 ENCOUNTER — MEDICAL CORRESPONDENCE (OUTPATIENT)
Dept: HEALTH INFORMATION MANAGEMENT | Facility: CLINIC | Age: 73
End: 2018-11-15

## 2018-12-07 ENCOUNTER — MEDICAL CORRESPONDENCE (OUTPATIENT)
Dept: HEALTH INFORMATION MANAGEMENT | Facility: CLINIC | Age: 73
End: 2018-12-07

## 2018-12-07 ENCOUNTER — TELEPHONE (OUTPATIENT)
Dept: FAMILY MEDICINE | Facility: CLINIC | Age: 73
End: 2018-12-07

## 2018-12-07 NOTE — TELEPHONE ENCOUNTER
[facility] on [facility]'s desk for signing and faxing back to [facility].

## 2018-12-16 ENCOUNTER — APPOINTMENT (OUTPATIENT)
Dept: GENERAL RADIOLOGY | Facility: CLINIC | Age: 73
DRG: 190 | End: 2018-12-16
Attending: EMERGENCY MEDICINE
Payer: COMMERCIAL

## 2018-12-16 ENCOUNTER — HOSPITAL ENCOUNTER (INPATIENT)
Facility: CLINIC | Age: 73
LOS: 4 days | Discharge: HOME | DRG: 190 | End: 2018-12-20
Attending: EMERGENCY MEDICINE | Admitting: FAMILY MEDICINE
Payer: COMMERCIAL

## 2018-12-16 DIAGNOSIS — J44.1 COPD EXACERBATION (H): ICD-10-CM

## 2018-12-16 DIAGNOSIS — Z87.891 PERSONAL HISTORY OF TOBACCO USE, PRESENTING HAZARDS TO HEALTH: ICD-10-CM

## 2018-12-16 DIAGNOSIS — Z79.01 LONG TERM (CURRENT) USE OF ANTICOAGULANTS: ICD-10-CM

## 2018-12-16 DIAGNOSIS — I48.0 PAROXYSMAL ATRIAL FIBRILLATION (H): ICD-10-CM

## 2018-12-16 DIAGNOSIS — I48.91 ATRIAL FIBRILLATION, UNSPECIFIED TYPE (H): ICD-10-CM

## 2018-12-16 DIAGNOSIS — J96.21 ACUTE AND CHRONIC RESPIRATORY FAILURE WITH HYPOXIA (H): ICD-10-CM

## 2018-12-16 DIAGNOSIS — J44.9 CHRONIC OBSTRUCTIVE PULMONARY DISEASE, UNSPECIFIED COPD TYPE (H): Primary | ICD-10-CM

## 2018-12-16 LAB
ALBUMIN SERPL-MCNC: 3.5 G/DL (ref 3.4–5)
ALP SERPL-CCNC: 41 U/L (ref 40–150)
ALT SERPL W P-5'-P-CCNC: 23 U/L (ref 0–70)
ANION GAP SERPL CALCULATED.3IONS-SCNC: 7 MMOL/L (ref 3–14)
AST SERPL W P-5'-P-CCNC: 24 U/L (ref 0–45)
BASE EXCESS BLDV CALC-SCNC: 1.1 MMOL/L
BASE EXCESS BLDV CALC-SCNC: 1.2 MMOL/L
BASOPHILS # BLD AUTO: 0 10E9/L (ref 0–0.2)
BASOPHILS NFR BLD AUTO: 0.5 %
BILIRUB SERPL-MCNC: 0.5 MG/DL (ref 0.2–1.3)
BUN SERPL-MCNC: 17 MG/DL (ref 7–30)
CALCIUM SERPL-MCNC: 8.2 MG/DL (ref 8.5–10.1)
CHLORIDE SERPL-SCNC: 102 MMOL/L (ref 94–109)
CO2 SERPL-SCNC: 29 MMOL/L (ref 20–32)
CREAT SERPL-MCNC: 0.86 MG/DL (ref 0.66–1.25)
DIFFERENTIAL METHOD BLD: NORMAL
EOSINOPHIL # BLD AUTO: 0 10E9/L (ref 0–0.7)
EOSINOPHIL NFR BLD AUTO: 0.3 %
ERYTHROCYTE [DISTWIDTH] IN BLOOD BY AUTOMATED COUNT: 13.2 % (ref 10–15)
GFR SERPL CREATININE-BSD FRML MDRD: 86 ML/MIN/1.7M2
GLUCOSE SERPL-MCNC: 147 MG/DL (ref 70–99)
HCO3 BLDV-SCNC: 28 MMOL/L (ref 21–28)
HCO3 BLDV-SCNC: 30 MMOL/L (ref 21–28)
HCT VFR BLD AUTO: 43.9 % (ref 40–53)
HGB BLD-MCNC: 14 G/DL (ref 13.3–17.7)
IMM GRANULOCYTES # BLD: 0 10E9/L (ref 0–0.4)
IMM GRANULOCYTES NFR BLD: 0.4 %
LACTATE BLD-SCNC: 2.1 MMOL/L (ref 0.7–2)
LYMPHOCYTES # BLD AUTO: 0.9 10E9/L (ref 0.8–5.3)
LYMPHOCYTES NFR BLD AUTO: 11.5 %
MCH RBC QN AUTO: 30.7 PG (ref 26.5–33)
MCHC RBC AUTO-ENTMCNC: 31.9 G/DL (ref 31.5–36.5)
MCV RBC AUTO: 96 FL (ref 78–100)
MONOCYTES # BLD AUTO: 1.1 10E9/L (ref 0–1.3)
MONOCYTES NFR BLD AUTO: 14.6 %
NEUTROPHILS # BLD AUTO: 5.7 10E9/L (ref 1.6–8.3)
NEUTROPHILS NFR BLD AUTO: 72.7 %
NRBC # BLD AUTO: 0 10*3/UL
NRBC BLD AUTO-RTO: 0 /100
NT-PROBNP SERPL-MCNC: 2222 PG/ML (ref 0–1800)
O2/TOTAL GAS SETTING VFR VENT: ABNORMAL %
PCO2 BLDV: 52 MM HG (ref 40–50)
PCO2 BLDV: 69 MM HG (ref 40–50)
PH BLDV: 7.25 PH (ref 7.32–7.43)
PH BLDV: 7.34 PH (ref 7.32–7.43)
PLATELET # BLD AUTO: 186 10E9/L (ref 150–450)
PO2 BLDV: 17 MM HG (ref 25–47)
PO2 BLDV: 38 MM HG (ref 25–47)
POTASSIUM SERPL-SCNC: 4.1 MMOL/L (ref 3.4–5.3)
PROT SERPL-MCNC: 7 G/DL (ref 6.8–8.8)
RBC # BLD AUTO: 4.56 10E12/L (ref 4.4–5.9)
SODIUM SERPL-SCNC: 138 MMOL/L (ref 133–144)
TROPONIN I SERPL-MCNC: 0.02 UG/L (ref 0–0.04)
WBC # BLD AUTO: 7.8 10E9/L (ref 4–11)

## 2018-12-16 PROCEDURE — 83605 ASSAY OF LACTIC ACID: CPT | Performed by: EMERGENCY MEDICINE

## 2018-12-16 PROCEDURE — 99285 EMERGENCY DEPT VISIT HI MDM: CPT | Mod: 25 | Performed by: EMERGENCY MEDICINE

## 2018-12-16 PROCEDURE — 96367 TX/PROPH/DG ADDL SEQ IV INF: CPT | Performed by: EMERGENCY MEDICINE

## 2018-12-16 PROCEDURE — 71046 X-RAY EXAM CHEST 2 VIEWS: CPT

## 2018-12-16 PROCEDURE — 25000125 ZZHC RX 250: Performed by: EMERGENCY MEDICINE

## 2018-12-16 PROCEDURE — 80053 COMPREHEN METABOLIC PANEL: CPT | Performed by: EMERGENCY MEDICINE

## 2018-12-16 PROCEDURE — 96375 TX/PRO/DX INJ NEW DRUG ADDON: CPT | Performed by: EMERGENCY MEDICINE

## 2018-12-16 PROCEDURE — 93005 ELECTROCARDIOGRAM TRACING: CPT | Performed by: EMERGENCY MEDICINE

## 2018-12-16 PROCEDURE — 87641 MR-STAPH DNA AMP PROBE: CPT | Performed by: INTERNAL MEDICINE

## 2018-12-16 PROCEDURE — 84484 ASSAY OF TROPONIN QUANT: CPT | Performed by: EMERGENCY MEDICINE

## 2018-12-16 PROCEDURE — 83880 ASSAY OF NATRIURETIC PEPTIDE: CPT | Performed by: EMERGENCY MEDICINE

## 2018-12-16 PROCEDURE — 94640 AIRWAY INHALATION TREATMENT: CPT

## 2018-12-16 PROCEDURE — 93010 ELECTROCARDIOGRAM REPORT: CPT | Mod: Z6 | Performed by: EMERGENCY MEDICINE

## 2018-12-16 PROCEDURE — 87640 STAPH A DNA AMP PROBE: CPT | Performed by: INTERNAL MEDICINE

## 2018-12-16 PROCEDURE — 82803 BLOOD GASES ANY COMBINATION: CPT | Performed by: EMERGENCY MEDICINE

## 2018-12-16 PROCEDURE — 85025 COMPLETE CBC W/AUTO DIFF WBC: CPT | Performed by: EMERGENCY MEDICINE

## 2018-12-16 PROCEDURE — 25000128 H RX IP 250 OP 636: Performed by: EMERGENCY MEDICINE

## 2018-12-16 PROCEDURE — 96365 THER/PROPH/DIAG IV INF INIT: CPT | Performed by: EMERGENCY MEDICINE

## 2018-12-16 PROCEDURE — 20000003 ZZH R&B ICU

## 2018-12-16 RX ORDER — ONDANSETRON 2 MG/ML
4 INJECTION INTRAMUSCULAR; INTRAVENOUS EVERY 6 HOURS PRN
Status: DISCONTINUED | OUTPATIENT
Start: 2018-12-16 | End: 2018-12-17

## 2018-12-16 RX ORDER — NALOXONE HYDROCHLORIDE 0.4 MG/ML
.1-.4 INJECTION, SOLUTION INTRAMUSCULAR; INTRAVENOUS; SUBCUTANEOUS
Status: DISCONTINUED | OUTPATIENT
Start: 2018-12-16 | End: 2018-12-17

## 2018-12-16 RX ORDER — ONDANSETRON 4 MG/1
4 TABLET, ORALLY DISINTEGRATING ORAL EVERY 6 HOURS PRN
Status: DISCONTINUED | OUTPATIENT
Start: 2018-12-16 | End: 2018-12-17

## 2018-12-16 RX ORDER — METHYLPREDNISOLONE SODIUM SUCCINATE 125 MG/2ML
125 INJECTION, POWDER, LYOPHILIZED, FOR SOLUTION INTRAMUSCULAR; INTRAVENOUS ONCE
Status: COMPLETED | OUTPATIENT
Start: 2018-12-16 | End: 2018-12-16

## 2018-12-16 RX ORDER — SODIUM CHLORIDE 9 MG/ML
INJECTION, SOLUTION INTRAVENOUS CONTINUOUS
Status: DISCONTINUED | OUTPATIENT
Start: 2018-12-16 | End: 2018-12-18

## 2018-12-16 RX ORDER — IPRATROPIUM BROMIDE AND ALBUTEROL SULFATE 2.5; .5 MG/3ML; MG/3ML
3 SOLUTION RESPIRATORY (INHALATION) ONCE
Status: COMPLETED | OUTPATIENT
Start: 2018-12-16 | End: 2018-12-16

## 2018-12-16 RX ORDER — CEFTRIAXONE SODIUM 1 G/50ML
1 INJECTION, SOLUTION INTRAVENOUS ONCE
Status: COMPLETED | OUTPATIENT
Start: 2018-12-16 | End: 2018-12-16

## 2018-12-16 RX ORDER — ALBUTEROL SULFATE 0.83 MG/ML
2.5 SOLUTION RESPIRATORY (INHALATION)
Status: DISCONTINUED | OUTPATIENT
Start: 2018-12-16 | End: 2018-12-20 | Stop reason: HOSPADM

## 2018-12-16 RX ORDER — PREDNISONE 20 MG/1
40 TABLET ORAL DAILY
Status: DISCONTINUED | OUTPATIENT
Start: 2018-12-17 | End: 2018-12-20 | Stop reason: HOSPADM

## 2018-12-16 RX ORDER — ACETAMINOPHEN 325 MG/1
650 TABLET ORAL EVERY 4 HOURS PRN
Status: DISCONTINUED | OUTPATIENT
Start: 2018-12-16 | End: 2018-12-20 | Stop reason: HOSPADM

## 2018-12-16 RX ORDER — IPRATROPIUM BROMIDE AND ALBUTEROL SULFATE 2.5; .5 MG/3ML; MG/3ML
3 SOLUTION RESPIRATORY (INHALATION)
Status: DISCONTINUED | OUTPATIENT
Start: 2018-12-16 | End: 2018-12-16

## 2018-12-16 RX ORDER — HYDROMORPHONE HYDROCHLORIDE 1 MG/ML
0.3 INJECTION, SOLUTION INTRAMUSCULAR; INTRAVENOUS; SUBCUTANEOUS EVERY 6 HOURS PRN
Status: DISCONTINUED | OUTPATIENT
Start: 2018-12-16 | End: 2018-12-20 | Stop reason: HOSPADM

## 2018-12-16 RX ORDER — IPRATROPIUM BROMIDE AND ALBUTEROL SULFATE 2.5; .5 MG/3ML; MG/3ML
3 SOLUTION RESPIRATORY (INHALATION)
Status: DISCONTINUED | OUTPATIENT
Start: 2018-12-17 | End: 2018-12-20 | Stop reason: HOSPADM

## 2018-12-16 RX ADMIN — IPRATROPIUM BROMIDE AND ALBUTEROL SULFATE 3 ML: .5; 3 SOLUTION RESPIRATORY (INHALATION) at 19:55

## 2018-12-16 RX ADMIN — CEFTRIAXONE SODIUM 1 G: 1 INJECTION, SOLUTION INTRAVENOUS at 22:18

## 2018-12-16 RX ADMIN — METHYLPREDNISOLONE SODIUM SUCCINATE 125 MG: 125 INJECTION, POWDER, FOR SOLUTION INTRAMUSCULAR; INTRAVENOUS at 19:44

## 2018-12-16 RX ADMIN — AZITHROMYCIN MONOHYDRATE 500 MG: 500 INJECTION, POWDER, LYOPHILIZED, FOR SOLUTION INTRAVENOUS at 22:57

## 2018-12-16 RX ADMIN — IPRATROPIUM BROMIDE AND ALBUTEROL SULFATE 3 ML: .5; 3 SOLUTION RESPIRATORY (INHALATION) at 20:48

## 2018-12-16 ASSESSMENT — ENCOUNTER SYMPTOMS
FLANK PAIN: 0
DYSURIA: 0
ACTIVITY CHANGE: 1
HEADACHES: 0
VOMITING: 0
NECK PAIN: 0
WEAKNESS: 0
LIGHT-HEADEDNESS: 0
ABDOMINAL PAIN: 0
CHEST TIGHTNESS: 0
COUGH: 1
SHORTNESS OF BREATH: 1
BRUISES/BLEEDS EASILY: 0
TROUBLE SWALLOWING: 0
DIZZINESS: 0
FEVER: 0
NAUSEA: 0
WHEEZING: 1
NUMBNESS: 0
BACK PAIN: 0

## 2018-12-16 ASSESSMENT — MIFFLIN-ST. JEOR: SCORE: 1380.83

## 2018-12-16 NOTE — LETTER
Transition Communication Hand-off for Care Transitions to Next Level of Care Provider    Name: Chadwick Aguilar  : 1945  MRN #: [id number]  Primary Care Provider: Jessi Hills  Primary Care MD Name: Reinier  Primary Clinic: [address] [facility] [address]  Primary Care Clinic Name: [facility]  Reason for Hospitalization:  COPD exacerbation (H) [J44.1]  Acute and chronic respiratory failure with hypoxia (H) [J96.21]  Atrial fibrillation, unspecified type (H) [I48.91]  Admit Date/Time: 2018  6:03 PM  Discharge Date: 2018  Payor Source: Payor: COMMERCIAL / Plan: UNITED [employer] MEDICARE ADVANTAGE / Product Type: HMO /     Readmission Assessment Measure (CHUCK) Risk Score/category: Average         Reason for Communication Hand-off Referral: Admission diagnoses: COPD    Discharge Plan: Home with spouse       Concern for non-adherence with plan of care:   Y/N no  Discharge Needs Assessment:  Needs      Most Recent Value   Equipment Currently Used at Home  none [owns shower chair, handheld shower, owns a cane]          Follow-up plan:    Future Appointments   Date Time Provider Department Center   2018 11:00 AM Lorraine, MARKOS Pierre MD [facility]       Any outstanding tests or procedures:    Procedures     Future Labs/Procedures    Oxygen Adult     Comments:    Renew Home Oxygen Order  Renew previous prescription.  Expected treatment length is indefinite (99 months).    Attending Provider: Noah Frey  Physician signature: See electronic signature associated with these discharge orders  Date of Order: 2018                Key Recommendations:  Patient admitted with COPD, is oxygen dependent at baseline. Lives at home with spouse; discussed home care and TCU with patient both patient and spouse denied any services on discharge.     Rita Mcclelland, MSN, RN, Care Coordinator  [facility] [phone]  [facility] [phone]    AVS/Discharge Summary is the source of truth;  this is a helpful guide for improved communication of patient story

## 2018-12-17 ENCOUNTER — APPOINTMENT (OUTPATIENT)
Dept: GENERAL RADIOLOGY | Facility: CLINIC | Age: 73
DRG: 190 | End: 2018-12-17
Attending: EMERGENCY MEDICINE
Payer: COMMERCIAL

## 2018-12-17 ENCOUNTER — TELEPHONE (OUTPATIENT)
Facility: CLINIC | Age: 73
End: 2018-12-17

## 2018-12-17 ENCOUNTER — APPOINTMENT (OUTPATIENT)
Dept: CARDIOLOGY | Facility: CLINIC | Age: 73
DRG: 190 | End: 2018-12-17
Attending: PHYSICIAN ASSISTANT
Payer: COMMERCIAL

## 2018-12-17 PROBLEM — I48.0 PAROXYSMAL ATRIAL FIBRILLATION (H): Status: ACTIVE | Noted: 2018-12-17

## 2018-12-17 PROBLEM — Z72.0 TOBACCO ABUSE: Status: ACTIVE | Noted: 2018-12-17

## 2018-12-17 LAB
ANION GAP SERPL CALCULATED.3IONS-SCNC: 8 MMOL/L (ref 3–14)
BASE EXCESS BLDV CALC-SCNC: 0.3 MMOL/L
BASE EXCESS BLDV CALC-SCNC: 2.2 MMOL/L
BASOPHILS # BLD AUTO: 0 10E9/L (ref 0–0.2)
BASOPHILS NFR BLD AUTO: 0.1 %
BUN SERPL-MCNC: 16 MG/DL (ref 7–30)
CALCIUM SERPL-MCNC: 8 MG/DL (ref 8.5–10.1)
CHLORIDE SERPL-SCNC: 104 MMOL/L (ref 94–109)
CO2 SERPL-SCNC: 26 MMOL/L (ref 20–32)
CREAT SERPL-MCNC: 0.78 MG/DL (ref 0.66–1.25)
DIFFERENTIAL METHOD BLD: ABNORMAL
EOSINOPHIL # BLD AUTO: 0 10E9/L (ref 0–0.7)
EOSINOPHIL NFR BLD AUTO: 0 %
ERYTHROCYTE [DISTWIDTH] IN BLOOD BY AUTOMATED COUNT: 13.3 % (ref 10–15)
GFR SERPL CREATININE-BSD FRML MDRD: >90 ML/MIN/1.7M2
GLUCOSE SERPL-MCNC: 187 MG/DL (ref 70–99)
HCO3 BLDV-SCNC: 27 MMOL/L (ref 21–28)
HCO3 BLDV-SCNC: 29 MMOL/L (ref 21–28)
HCT VFR BLD AUTO: 41.5 % (ref 40–53)
HGB BLD-MCNC: 13.4 G/DL (ref 13.3–17.7)
IMM GRANULOCYTES # BLD: 0 10E9/L (ref 0–0.4)
IMM GRANULOCYTES NFR BLD: 0.3 %
LYMPHOCYTES # BLD AUTO: 0.6 10E9/L (ref 0.8–5.3)
LYMPHOCYTES NFR BLD AUTO: 9.2 %
MCH RBC QN AUTO: 31.3 PG (ref 26.5–33)
MCHC RBC AUTO-ENTMCNC: 32.3 G/DL (ref 31.5–36.5)
MCV RBC AUTO: 97 FL (ref 78–100)
MONOCYTES # BLD AUTO: 0.3 10E9/L (ref 0–1.3)
MONOCYTES NFR BLD AUTO: 4.4 %
MRSA DNA SPEC QL NAA+PROBE: NEGATIVE
NEUTROPHILS # BLD AUTO: 5.9 10E9/L (ref 1.6–8.3)
NEUTROPHILS NFR BLD AUTO: 86 %
NRBC # BLD AUTO: 0 10*3/UL
NRBC BLD AUTO-RTO: 0 /100
O2/TOTAL GAS SETTING VFR VENT: ABNORMAL %
O2/TOTAL GAS SETTING VFR VENT: ABNORMAL %
PCO2 BLDV: 51 MM HG (ref 40–50)
PCO2 BLDV: 55 MM HG (ref 40–50)
PH BLDV: 7.33 PH (ref 7.32–7.43)
PH BLDV: 7.34 PH (ref 7.32–7.43)
PLATELET # BLD AUTO: 171 10E9/L (ref 150–450)
PO2 BLDV: 32 MM HG (ref 25–47)
PO2 BLDV: 51 MM HG (ref 25–47)
POTASSIUM SERPL-SCNC: 4.4 MMOL/L (ref 3.4–5.3)
RBC # BLD AUTO: 4.28 10E12/L (ref 4.4–5.9)
SODIUM SERPL-SCNC: 138 MMOL/L (ref 133–144)
SPECIMEN SOURCE: NORMAL
WBC # BLD AUTO: 6.8 10E9/L (ref 4–11)

## 2018-12-17 PROCEDURE — 82803 BLOOD GASES ANY COMBINATION: CPT | Performed by: PHYSICIAN ASSISTANT

## 2018-12-17 PROCEDURE — 25000132 ZZH RX MED GY IP 250 OP 250 PS 637: Performed by: PHYSICIAN ASSISTANT

## 2018-12-17 PROCEDURE — 93306 TTE W/DOPPLER COMPLETE: CPT | Mod: 26 | Performed by: INTERNAL MEDICINE

## 2018-12-17 PROCEDURE — 25000128 H RX IP 250 OP 636: Performed by: EMERGENCY MEDICINE

## 2018-12-17 PROCEDURE — 12000010 ZZH R&B MS INTERMEDIATE OVERFLOW

## 2018-12-17 PROCEDURE — 25000128 H RX IP 250 OP 636: Performed by: PHYSICIAN ASSISTANT

## 2018-12-17 PROCEDURE — 99223 1ST HOSP IP/OBS HIGH 75: CPT | Mod: AI | Performed by: FAMILY MEDICINE

## 2018-12-17 PROCEDURE — 25000132 ZZH RX MED GY IP 250 OP 250 PS 637: Performed by: INTERNAL MEDICINE

## 2018-12-17 PROCEDURE — 94640 AIRWAY INHALATION TREATMENT: CPT

## 2018-12-17 PROCEDURE — 80048 BASIC METABOLIC PNL TOTAL CA: CPT | Performed by: EMERGENCY MEDICINE

## 2018-12-17 PROCEDURE — 36415 COLL VENOUS BLD VENIPUNCTURE: CPT | Performed by: PHYSICIAN ASSISTANT

## 2018-12-17 PROCEDURE — 25000125 ZZHC RX 250: Performed by: PHYSICIAN ASSISTANT

## 2018-12-17 PROCEDURE — 71046 X-RAY EXAM CHEST 2 VIEWS: CPT

## 2018-12-17 PROCEDURE — 93306 TTE W/DOPPLER COMPLETE: CPT

## 2018-12-17 PROCEDURE — 25000132 ZZH RX MED GY IP 250 OP 250 PS 637: Performed by: FAMILY MEDICINE

## 2018-12-17 PROCEDURE — 85025 COMPLETE CBC W/AUTO DIFF WBC: CPT | Performed by: EMERGENCY MEDICINE

## 2018-12-17 PROCEDURE — 87633 RESP VIRUS 12-25 TARGETS: CPT | Performed by: FAMILY MEDICINE

## 2018-12-17 PROCEDURE — 99207 ZZC APP CREDIT; MD BILLING SHARED VISIT: CPT | Performed by: PHYSICIAN ASSISTANT

## 2018-12-17 PROCEDURE — 94640 AIRWAY INHALATION TREATMENT: CPT | Mod: 76

## 2018-12-17 RX ORDER — SIMVASTATIN 10 MG
10 TABLET ORAL AT BEDTIME
Status: DISCONTINUED | OUTPATIENT
Start: 2018-12-17 | End: 2018-12-20 | Stop reason: HOSPADM

## 2018-12-17 RX ORDER — CLOPIDOGREL BISULFATE 75 MG/1
75 TABLET ORAL EVERY EVENING
Status: DISCONTINUED | OUTPATIENT
Start: 2018-12-17 | End: 2018-12-17

## 2018-12-17 RX ORDER — METOPROLOL SUCCINATE 25 MG/1
25 TABLET, EXTENDED RELEASE ORAL DAILY
Status: DISCONTINUED | OUTPATIENT
Start: 2018-12-17 | End: 2018-12-18

## 2018-12-17 RX ORDER — ONDANSETRON 4 MG/1
4 TABLET, ORALLY DISINTEGRATING ORAL EVERY 6 HOURS PRN
Status: DISCONTINUED | OUTPATIENT
Start: 2018-12-17 | End: 2018-12-20 | Stop reason: HOSPADM

## 2018-12-17 RX ORDER — METOPROLOL SUCCINATE 100 MG/1
100 TABLET, EXTENDED RELEASE ORAL DAILY
Status: DISCONTINUED | OUTPATIENT
Start: 2018-12-17 | End: 2018-12-20 | Stop reason: HOSPADM

## 2018-12-17 RX ORDER — MEMANTINE HYDROCHLORIDE 10 MG/1
10 TABLET ORAL 2 TIMES DAILY
Status: DISCONTINUED | OUTPATIENT
Start: 2018-12-17 | End: 2018-12-20 | Stop reason: HOSPADM

## 2018-12-17 RX ORDER — BISACODYL 5 MG
5 TABLET, DELAYED RELEASE (ENTERIC COATED) ORAL DAILY PRN
Status: DISCONTINUED | OUTPATIENT
Start: 2018-12-17 | End: 2018-12-20 | Stop reason: HOSPADM

## 2018-12-17 RX ORDER — ASPIRIN 81 MG/1
81 TABLET, CHEWABLE ORAL EVERY EVENING
Status: DISCONTINUED | OUTPATIENT
Start: 2018-12-17 | End: 2018-12-20 | Stop reason: HOSPADM

## 2018-12-17 RX ORDER — BISACODYL 5 MG
15 TABLET, DELAYED RELEASE (ENTERIC COATED) ORAL DAILY PRN
Status: DISCONTINUED | OUTPATIENT
Start: 2018-12-17 | End: 2018-12-20 | Stop reason: HOSPADM

## 2018-12-17 RX ORDER — PROCHLORPERAZINE 25 MG
12.5 SUPPOSITORY, RECTAL RECTAL EVERY 12 HOURS PRN
Status: DISCONTINUED | OUTPATIENT
Start: 2018-12-17 | End: 2018-12-20 | Stop reason: HOSPADM

## 2018-12-17 RX ORDER — POLYETHYLENE GLYCOL 3350 17 G/17G
17 POWDER, FOR SOLUTION ORAL DAILY PRN
Status: DISCONTINUED | OUTPATIENT
Start: 2018-12-17 | End: 2018-12-20 | Stop reason: HOSPADM

## 2018-12-17 RX ORDER — CITALOPRAM HYDROBROMIDE 20 MG/1
20 TABLET ORAL EVERY EVENING
Status: DISCONTINUED | OUTPATIENT
Start: 2018-12-17 | End: 2018-12-20 | Stop reason: HOSPADM

## 2018-12-17 RX ORDER — MAGNESIUM CARB/ALUMINUM HYDROX 105-160MG
148 TABLET,CHEWABLE ORAL
Status: DISCONTINUED | OUTPATIENT
Start: 2018-12-17 | End: 2018-12-20 | Stop reason: HOSPADM

## 2018-12-17 RX ORDER — BISACODYL 5 MG
10 TABLET, DELAYED RELEASE (ENTERIC COATED) ORAL DAILY PRN
Status: DISCONTINUED | OUTPATIENT
Start: 2018-12-17 | End: 2018-12-20 | Stop reason: HOSPADM

## 2018-12-17 RX ORDER — LISINOPRIL 40 MG/1
40 TABLET ORAL EVERY EVENING
Status: DISCONTINUED | OUTPATIENT
Start: 2018-12-17 | End: 2018-12-20 | Stop reason: HOSPADM

## 2018-12-17 RX ORDER — CEFTRIAXONE SODIUM 1 G/50ML
1 INJECTION, SOLUTION INTRAVENOUS EVERY 24 HOURS
Status: DISCONTINUED | OUTPATIENT
Start: 2018-12-17 | End: 2018-12-18

## 2018-12-17 RX ORDER — PROCHLORPERAZINE MALEATE 5 MG
5 TABLET ORAL EVERY 6 HOURS PRN
Status: DISCONTINUED | OUTPATIENT
Start: 2018-12-17 | End: 2018-12-20 | Stop reason: HOSPADM

## 2018-12-17 RX ORDER — METOPROLOL SUCCINATE 100 MG/1
100 TABLET, EXTENDED RELEASE ORAL DAILY
Status: DISCONTINUED | OUTPATIENT
Start: 2018-12-17 | End: 2018-12-17

## 2018-12-17 RX ORDER — ONDANSETRON 2 MG/ML
4 INJECTION INTRAMUSCULAR; INTRAVENOUS EVERY 6 HOURS PRN
Status: DISCONTINUED | OUTPATIENT
Start: 2018-12-17 | End: 2018-12-20 | Stop reason: HOSPADM

## 2018-12-17 RX ORDER — NALOXONE HYDROCHLORIDE 0.4 MG/ML
.1-.4 INJECTION, SOLUTION INTRAMUSCULAR; INTRAVENOUS; SUBCUTANEOUS
Status: DISCONTINUED | OUTPATIENT
Start: 2018-12-17 | End: 2018-12-20 | Stop reason: HOSPADM

## 2018-12-17 RX ADMIN — RIVAROXABAN 20 MG: 10 TABLET, FILM COATED ORAL at 16:54

## 2018-12-17 RX ADMIN — METOPROLOL SUCCINATE 100 MG: 100 TABLET, EXTENDED RELEASE ORAL at 19:50

## 2018-12-17 RX ADMIN — ASPIRIN 81 MG 81 MG: 81 TABLET ORAL at 16:54

## 2018-12-17 RX ADMIN — CITALOPRAM HYDROBROMIDE 20 MG: 20 TABLET ORAL at 16:54

## 2018-12-17 RX ADMIN — METOPROLOL SUCCINATE 25 MG: 25 TABLET, EXTENDED RELEASE ORAL at 08:17

## 2018-12-17 RX ADMIN — MEMANTINE HYDROCHLORIDE 10 MG: 10 TABLET, FILM COATED ORAL at 19:50

## 2018-12-17 RX ADMIN — IPRATROPIUM BROMIDE AND ALBUTEROL SULFATE 3 ML: .5; 3 SOLUTION RESPIRATORY (INHALATION) at 16:09

## 2018-12-17 RX ADMIN — SIMVASTATIN 10 MG: 10 TABLET, FILM COATED ORAL at 21:55

## 2018-12-17 RX ADMIN — LISINOPRIL 40 MG: 40 TABLET ORAL at 16:54

## 2018-12-17 RX ADMIN — FLUTICASONE FUROATE AND VILANTEROL TRIFENATATE 1 PUFF: 100; 25 POWDER RESPIRATORY (INHALATION) at 07:55

## 2018-12-17 RX ADMIN — CEFTRIAXONE SODIUM 1 G: 1 INJECTION, SOLUTION INTRAVENOUS at 19:45

## 2018-12-17 RX ADMIN — IPRATROPIUM BROMIDE AND ALBUTEROL SULFATE 3 ML: .5; 3 SOLUTION RESPIRATORY (INHALATION) at 06:13

## 2018-12-17 RX ADMIN — AZITHROMYCIN MONOHYDRATE 500 MG: 500 INJECTION, POWDER, LYOPHILIZED, FOR SOLUTION INTRAVENOUS at 21:55

## 2018-12-17 RX ADMIN — PREDNISONE 40 MG: 20 TABLET ORAL at 07:54

## 2018-12-17 RX ADMIN — MEMANTINE HYDROCHLORIDE 10 MG: 10 TABLET, FILM COATED ORAL at 07:55

## 2018-12-17 RX ADMIN — SODIUM CHLORIDE: 9 INJECTION, SOLUTION INTRAVENOUS at 00:03

## 2018-12-17 RX ADMIN — IPRATROPIUM BROMIDE AND ALBUTEROL SULFATE 3 ML: .5; 3 SOLUTION RESPIRATORY (INHALATION) at 19:38

## 2018-12-17 ASSESSMENT — ACTIVITIES OF DAILY LIVING (ADL)
ADLS_ACUITY_SCORE: 15

## 2018-12-17 ASSESSMENT — ENCOUNTER SYMPTOMS: CONFUSION: 1

## 2018-12-17 ASSESSMENT — MIFFLIN-ST. JEOR: SCORE: 1353.75

## 2018-12-17 NOTE — PROGRESS NOTES
8:12 AM  I saw and examined  the patient and talked to him and his wife.  He has CAD, PVD and copd. He is on plavix and asa.  Now with copd exacerbation possibly due to viral illness.  He presented with afib that appears to be new.  Currently his breathing is better.  He is wheezing and is on oxygen.  He is having some sinus beats. Rate is controlled.  I talked to his wife-he is recommended to start an anticoagulant.  Suspect we could stop plavix and go with asa and anticoagulant.  Will need echo.  resp virus panel added.  Pt sees Mercedes Berry for cards.

## 2018-12-17 NOTE — ED PROVIDER NOTES
History     Chief Complaint   Patient presents with     Shortness of Breath     HPI  Chadwick Aguilar is a 73 year old male with a history of peripheral vascular disease, COPD, dementia, hyperlipidemia, hypertension, arteriosclerotic vascular disease, atrial fibrillation and long term anticoagulation who presents to the emergency department today for evaluation of shortness of breath. Patient has been suffering from a cold for a while. This morning he got worse and had difficulties breathing. Patient sat around all day and didn't do anything. Patient didn't eat anything today. When he coughs he tries to get stuff up but is unable to. Patient has inhalers and a nebulizer at home. Patient received two treatments of the nebulizer yesterday.       Patient Active Problem List   Diagnosis     PVD (peripheral vascular disease) (H)     Chronic obstructive pulmonary disease, unspecified COPD type (H)     Dementia without behavioral disturbance, unspecified dementia type     Claudication (H)     Arteriosclerotic vascular disease     Essential hypertension, benign     Hyperlipidemia LDL goal <70     Advanced directives, counseling/discussion     Current Outpatient Medications   Medication Sig Dispense Refill     albuterol (2.5 MG/3ML) 0.083% neb solution Take 1 vial (2.5 mg) by nebulization every 6 hours as needed for shortness of breath / dyspnea or wheezing 25 vial 11     aspirin 81 MG chewable tablet Take 1 tablet (81 mg) by mouth daily 108 tablet 3     citalopram (CELEXA) 20 MG tablet Take 1 tablet (20 mg) by mouth daily 90 tablet 3     clopidogrel (PLAVIX) 75 MG tablet Take 1 tablet (75 mg) by mouth daily 90 tablet 3     fluticasone-salmeterol (ADVAIR DISKUS) 250-50 MCG/DOSE diskus inhaler Inhale 1 puff into the lungs 2 times daily 60 Inhaler 11     lisinopril (PRINIVIL/ZESTRIL) 40 MG tablet Take 1 tablet (40 mg) by mouth daily 90 tablet 3     memantine (NAMENDA) 10 MG tablet Take 1 tablet (10 mg) by mouth 2 times  daily 180 tablet 3     metoprolol succinate (TOPROL-XL) 100 MG 24 hr tablet Take 1 tablet (100 mg) by mouth daily 90 tablet 3     Multiple Vitamins-Minerals (MULTIVITAMIN ADULT PO)        nitroGLYcerin (NITROSTAT) 0.4 MG sublingual tablet For chest pain place 1 tablet under the tongue every 5 minutes for 3 doses. If symptoms persist 5 minutes after 1st dose call 911. 25 tablet 0     order for DME Equipment being ordered: Portable oxygen concentrator with O2 @ 2L NC PRN shortnes of breath. 1 each 0     order for DME Equipment being ordered: shower chair 1 each 0     simvastatin (ZOCOR) 10 MG tablet Take 1 tablet (10 mg) by mouth At Bedtime 93 tablet 3     Thiamine HCl (VITAMIN B-1 PO) Take 250 mg by mouth daily        tiotropium (SPIRIVA HANDIHALER) 18 MCG capsule Inhale 1 capsule (18 mcg) into the lungs daily Inhale contents of one capsule daily. 90 capsule 3     No Known Allergies    Problem List:    Patient Active Problem List    Diagnosis Date Noted     Advanced directives, counseling/discussion 03/17/2017     Priority: Medium     Patient does not have an Advance/Health Care Directive (HCD), declines information/referral.    Antonia Neely  March 17, 2017         Claudication (H) 02/01/2017     Priority: Medium     Arteriosclerotic vascular disease 02/01/2017     Priority: Medium     Essential hypertension, benign 02/01/2017     Priority: Medium     Hyperlipidemia LDL goal <70 02/01/2017     Priority: Medium     PVD (peripheral vascular disease) (H) 01/20/2017     Priority: Medium     Has stent in left leg.       Dementia without behavioral disturbance, unspecified dementia type 01/20/2017     Priority: Medium     Chronic obstructive pulmonary disease, unspecified COPD type (H) 01/01/2014     Priority: Medium        Past Medical History:    No past medical history on file.    Past Surgical History:    No past surgical history on file.    Family History:    No family history on file.    Social History:  Marital  Status:   [2]  Social History     Tobacco Use     Smoking status: Former Smoker     Years: 50.00     Types: Cigarettes     Last attempt to quit: 2016     Years since quittin.9     Smokeless tobacco: Never Used     Tobacco comment: uses patches PRN ? 1 a day occassionally   Substance Use Topics     Alcohol use: Yes     Comment: occassionally     Drug use: No        Medications:      albuterol (2.5 MG/3ML) 0.083% neb solution   aspirin 81 MG chewable tablet   citalopram (CELEXA) 20 MG tablet   clopidogrel (PLAVIX) 75 MG tablet   fluticasone-salmeterol (ADVAIR DISKUS) 250-50 MCG/DOSE diskus inhaler   lisinopril (PRINIVIL/ZESTRIL) 40 MG tablet   memantine (NAMENDA) 10 MG tablet   metoprolol succinate (TOPROL-XL) 100 MG 24 hr tablet   Multiple Vitamins-Minerals (MULTIVITAMIN ADULT PO)   nitroGLYcerin (NITROSTAT) 0.4 MG sublingual tablet   order for DME   order for DME   simvastatin (ZOCOR) 10 MG tablet   Thiamine HCl (VITAMIN B-1 PO)   tiotropium (SPIRIVA HANDIHALER) 18 MCG capsule         Review of Systems   Constitutional: Positive for activity change. Negative for fever.   HENT: Positive for congestion. Negative for ear pain, postnasal drip and trouble swallowing.    Eyes: Negative for visual disturbance.   Respiratory: Positive for cough, shortness of breath and wheezing. Negative for chest tightness.    Cardiovascular: Negative for chest pain and leg swelling.   Gastrointestinal: Negative for abdominal pain, nausea and vomiting.   Genitourinary: Negative for decreased urine volume, dysuria and flank pain.   Musculoskeletal: Negative for back pain and neck pain.   Skin: Negative for rash.   Neurological: Negative for dizziness, weakness, light-headedness, numbness and headaches.   Hematological: Does not bruise/bleed easily.   Psychiatric/Behavioral: Positive for confusion.        Positive dementia   All other systems reviewed and are negative.      Physical Exam   BP: 184/83  Pulse: 104  Heart Rate:  "101  Temp: 98.8  F (37.1  C)  Resp: 17  Height: 167.6 cm (5' 6\")  Weight: 70.3 kg (155 lb)  SpO2: (!) 89 %      Physical Exam   Constitutional:   Thin elderly male thin elderly male   HENT:   Head: Normocephalic.   Mouth/Throat: Oropharynx is clear and moist.   Psychiatric: He has a normal mood and affect.   Nursing note and vitals reviewed.    HENT: Oral mucosa moist. No lesions.  Neck: Supple  Pulmonary/Chest: Faint expiatory wheeze in upper lung fields with decreased breath sounds at basis.   Cardiovascular: Heart is irregularly irregular with tachycardia.   Abdomen: Soft, non-distended, non-tender.   Musculoskeletal: Moving all extremities well. No peripheral edema.   Neurological: Alert. No focal neurologic deficit.   Skin: No rash.    ED Course   6:52 PM      Procedures               EKG Interpretation:      Interpreted by Dalton Nayak  Rhythm: atrial fibrillation - rapid  Rate: 110-120  Axis: Normal  Ectopy: premature ventricular contractions (frequent)  Conduction: nonspecific interventricular conduction block  ST Segments/ T Waves: Non-specific ST-T wave changes  Q Waves: none  Comparison to prior: atrial fibrillation is new    Clinical Impression: atrial fibrillation (new onset)                Critical Care time:  was 30 minutes for this patient excluding procedures.for management of hypoxia     The Lactic acid level is elevated due to possible dehydration vs. Pneumonia at  this time there is no sign of severe sepsis or septic shock.      3 Hour Severe Sepsis Bundle Completion:  1. Initial Lactic Acid Result:   Recent Labs   Lab Test 12/16/18  1821   LACT 2.1*        Anti-infectives (From now, onward)    Start     Dose/Rate Route Frequency Ordered Stop    12/17/18 2000  cefTRIAXone in d5w (ROCEPHIN) intermittent infusion 1 g      1 g  over 30 Minutes Intravenous EVERY 24 HOURS 12/17/18 1017      12/16/18 2159  azithromycin (ZITHROMAX) 500 mg in sodium chloride 0.9 % 250 mL intermittent infusion      " 500 mg  over 1 Hours Intravenous EVERY 24 HOURS 12/16/18 2159                        Results for orders placed or performed during the hospital encounter of 12/16/18 (from the past 24 hour(s))   CBC with platelets differential   Result Value Ref Range    WBC 7.8 4.0 - 11.0 10e9/L    RBC Count 4.56 4.4 - 5.9 10e12/L    Hemoglobin 14.0 13.3 - 17.7 g/dL    Hematocrit 43.9 40.0 - 53.0 %    MCV 96 78 - 100 fl    MCH 30.7 26.5 - 33.0 pg    MCHC 31.9 31.5 - 36.5 g/dL    RDW 13.2 10.0 - 15.0 %    Platelet Count 186 150 - 450 10e9/L    Diff Method Automated Method     % Neutrophils 72.7 %    % Lymphocytes 11.5 %    % Monocytes 14.6 %    % Eosinophils 0.3 %    % Basophils 0.5 %    % Immature Granulocytes 0.4 %    Nucleated RBCs 0 0 /100    Absolute Neutrophil 5.7 1.6 - 8.3 10e9/L    Absolute Lymphocytes 0.9 0.8 - 5.3 10e9/L    Absolute Monocytes 1.1 0.0 - 1.3 10e9/L    Absolute Eosinophils 0.0 0.0 - 0.7 10e9/L    Absolute Basophils 0.0 0.0 - 0.2 10e9/L    Abs Immature Granulocytes 0.0 0 - 0.4 10e9/L    Absolute Nucleated RBC 0.0    Comprehensive metabolic panel   Result Value Ref Range    Sodium 138 133 - 144 mmol/L    Potassium 4.1 3.4 - 5.3 mmol/L    Chloride 102 94 - 109 mmol/L    Carbon Dioxide 29 20 - 32 mmol/L    Anion Gap 7 3 - 14 mmol/L    Glucose 147 (H) 70 - 99 mg/dL    Urea Nitrogen 17 7 - 30 mg/dL    Creatinine 0.86 0.66 - 1.25 mg/dL    GFR Estimate 86 >60 mL/min/1.7m2    GFR Estimate If Black >90 >60 mL/min/1.7m2    Calcium 8.2 (L) 8.5 - 10.1 mg/dL    Bilirubin Total 0.5 0.2 - 1.3 mg/dL    Albumin 3.5 3.4 - 5.0 g/dL    Protein Total 7.0 6.8 - 8.8 g/dL    Alkaline Phosphatase 41 40 - 150 U/L    ALT 23 0 - 70 U/L    AST 24 0 - 45 U/L   Lactic acid whole blood   Result Value Ref Range    Lactic Acid 2.1 (H) 0.7 - 2.0 mmol/L   Nt probnp inpatient (BNP)   Result Value Ref Range    N-Terminal Pro BNP Inpatient 2,222 (H) 0 - 1,800 pg/mL   Troponin I   Result Value Ref Range    Troponin I ES 0.017 0.000 - 0.045 ug/L    Chest XR,  PA & LAT    Narrative    CHEST TWO VIEWS  12/16/2018 7:33 PM     HISTORY:  Shortness of breath.    COMPARISON: None.      Impression    IMPRESSION: Hyperinflation both lungs. The lungs are otherwise clear.  No pleural effusions are identified. Heart size and pulmonary  vascularity are within normal limits. No pneumothorax. Aortic  calcification.    GRACIE NAVA MD   Blood gas venous   Result Value Ref Range    Ph Venous 7.25 (L) 7.32 - 7.43 pH    PCO2 Venous 69 (H) 40 - 50 mm Hg    PO2 Venous 17 (L) 25 - 47 mm Hg    Bicarbonate Venous 30 (H) 21 - 28 mmol/L    Base Excess Venous 1.2 mmol/L       Medications - No data to display    Assessments & Plan (with Medical Decision Making) cards were reviewed labs were obtained.  Patient was given a DuoNeb and Solu-Medrol.  Labs were obtained EKG revealed an atrial fibrillation I do not have patient being in atrial fibrillation previously although wife states he has a history of irregular heartbeat.  He is on aspirin and Plavix at this point.  His rate has been bouncing between 90 and 110 with not knowing how long he has been in this rhythm I do not feel comfortable cardioverting him at this time.  Lactic acid was slightly elevated 2.1 he was given a fluid bolus.  Comprehensive metabolic panel without significant abnormality.  White count was within normal limits and there was not shift.  Troponin was 0.017 and BNP was elevated to 2200.  Chest x-ray revealed hyperinflated lungs with no effusions heart size and pulmonary vascularity are within normal limits no pneumothorax.  Patient was still wheezing after first nebulizer treatment and therefore a second nebulizer treatment was given to the patient venous blood gas was obtained.  This revealed a pH 7.25 with a CO2 of 69 O2 of 17 bicarb 30 and base excess 1.2.  Due to the patient's elevated lactic acid and productive cough I decided to cover the patient with ceftriaxone and Zithromax.  Patient was observed for  some time his wheezing had diminished breath sounds at.  More comfortable at this time but a repeat venous blood gas was obtained.  I did talk to Maggi MOMIN who was in agreement with admission.  This is decided to admit the patient to ICU to monitor closely in case he needed BiPAP.  Repeat blood gas with improvement from previous patient appears to be breathing comfortably at this time will hold off on BiPAP at this time.     I have reviewed the nursing notes.    I have reviewed the findings, diagnosis, plan and need for follow up with the patient.          Medication List      There are no discharge medications for this visit.         Final diagnoses:   COPD exacerbation (H)   Atrial fibrillation, unspecified type (H)   Acute and chronic respiratory failure with hypoxia (H)     This document serves as a record of the services and decisions personally performed and made by Dalton Nayak MD. It was created on HIS/HER behalf by Stephanie Solomon, a trained medical scribe. The creation of this document is based on the provider's statements to the medical scribe.  Stephanie Solomon 6:58 PM 12/16/2018    Provider:  The information in this document, created by the medical scribe for me, accurately reflects the services I personally performed and the decisions made by me. I have reviewed and approved this document for accuracy prior to leaving the patient care area.  Dalton Nayak MD 6:58 PM 12/16/2018 12/16/2018   [facility] EMERGENCY DEPARTMENT     Dalton Nayak MD  12/17/18 9812

## 2018-12-17 NOTE — CONSULTS
Care Transition Initial Assessment - RN      Met with: Patient and Family.    DATA   Principal Problem:    COPD exacerbation (H)  Active Problems:    PVD (peripheral vascular disease) (H)    Chronic obstructive pulmonary disease, unspecified COPD type (H)    Dementia without behavioral disturbance, unspecified dementia type    Arteriosclerotic vascular disease    Essential hypertension, benign    Hyperlipidemia LDL goal <70    Paroxysmal atrial fibrillation (H)    Tobacco abuse       Primary Care Clinic Name: [facility]  Primary Care MD Name: Reinier  Contact information and PCP information verified: Yes    ASSESSMENT  Cognitive Status: awake and alert.                      Description of Support System: Supportive, Involved   Who is your support system?: Wife(grandchildren)   Support Assessment: Adequate family and caregiver support   Insurance Concerns: No Insurance issues identified      This writer met with pt and spuse, introduced self and role. Discussed discharge planning and Medicare guidelines in regards to home care and SNF benefits. Patient lives independently with his wife. Patient continues to drive. Discussed home care and patient declined. Denied any needs on discharge. Copd education and action plan given.     PLAN    Home      Discharge Planner   Discharge Plans in progress: Home  Barriers to discharge plan: clinical improvement  Follow up plan: per MD       Entered by: MATT MARISCAL 12/17/2018 1:51 PM         Rita Mcclelland, MSN, RN, Care Coordinator  [facility] [phone]  [facility] [phone]

## 2018-12-17 NOTE — ED NOTES
Spoke with jag went over home meds   She will keep her phone on tonight if she is needed please call

## 2018-12-17 NOTE — H&P
[facility]    History and Physical  Hospital Medicine       Date of Admission:  12/16/2018  Date of Service: 12/17/2018     CC: shortness of breath     Assessment & Plan   Chadwick Aguilar is a 73 year old male with a past medical history significant for peripheral vascular disease s/p iliac and femoral stents, arteriosclerotic vascular disease, oxygen dependent COPD, dyslipidemia, hypertension, dementia, and tobacco abuse who presents on 12/16/2018 with shortness of breath found to have COPD exacerbation and new atrial fibrillation.      COPD exacerbation  Chronic obstructive pulmonary disease, unspecified COPD type  Oxygen dependent on 2L at baseline, mostly uses at night. Managed prior to admission on Advair 250, Spiriva, and prn albuterol. Presents with increased wheezing, no hypoxia at baseline O2 needs. Afebrile, no leukocytosis. Chest x-ray showing bilateral hyperinflation and aortic calcifications, but lungs otherwise clear. Initial VBG respiratory acidosis - pH 7.25 / pCO2 69 / pO2 17 / bicarb 30 - repeat VBGs improving slightly. Admit BNP 2222, but otherwise appears euvolemic without evidence of heart failure. Presentation most consistent with COPD exacerbation, although new atrial fibrillation (see below) may also be contributing to respiratory status. Patient was given DuoNebs, Solumedrol, ceftriaxone, and azithromycin in the emergency department.  - Initially admitted to ICU for concern that he may need BiPAP, but has been stable without, so was changed to med/surg status  - Continue ceftriaxone and azithromycin  - Prednisone 40 mg daily  - Scheduled DuoNebs q 4 hours while awake  - Continue prior to admission Advair and prn albuterol  - Hold prior to admission Spiriva  - Repeat VBG and lactic acid at 3 pm  - Supplemental O2 to maintain sats > 92%  - Respiratory virus panel pending       Paroxysmal atrial fibrillation - new diagnosis  Admit EKG demonstrating atrial  "fibrillation with ectopic beats. Patient with reported history of \"abnormal rhythm,\" but review of records does not show formal diagnosis of atrial fibrillation, and brief review of prior EKGs and cardiac testing show normal sinus rhythm. Is on antiplatelets but not anticoagulation prior to admission. Taking metoprolol  mg daily prior to admission. CHADs-VASc = 4.  - Continue prior to admission metoprolol, but increase to 125 mg daily  - Initiate Xarelto - discussed with pharmacy, patient requires prior authorization to obtain coverage (cost will be $56 for 90 day supply through mail order, or $47 for 30 day supply through retail pharmacy), but can get free one month supply while PA is in process  - Stop prior to admission Plavix once anticoagulation initiated  - Monitor on telemetry  - Echo pending      Elevated lactic acid  Admit lactic acid = 2.1. No evidence for infection or sepsis. Likely secondary to dehydration due to poor oral intake recently.  - Recheck VBG and lactic acid at 3 pm      Decreased appetite  Not eating or drinking much for 2 days prior to admission. Not interested in eating much at meals currently.  - Encouraged him to sip on Boost throughout the day  - Clear liquid diet ordered, may advance as tolerated      PVD (peripheral vascular disease)  Arteriosclerotic vascular disease  Hyperlipidemia LDL goal <70  History of right iliac and femoral stents in 2016. History of coronary angiogram in 2016 but PCI not needed. Lexiscan in 2015 showing 10% stenosis of LAD, 40% stenosis of RCA - medically managed, follows with Dr. Sheron Berry. Managed prior to admission with aspirin 81 daily, Plavix 75 mg daily, metoprolol  mg daily, Zocor 10 mg daily, lisinopril 40 mg daily, and prn nitroglycerine.  - Increase metoprolol to 125 mg daily as above  - Continue prior to admission aspirin  - Stop prior to admission Plavix once anticoagulation initiated  - Continue prior to admission Zocor and " lisinopril      Essential hypertension, benign  Pressures reviewed, mildly elevated but stable. Managed prior to admission with metoprolol 100 mg daily and lisinopril 40 mg daily.   - Increase metoprolol as above  - Continue prior to admission lisinopril      Dementia without behavioral disturbance, unspecified dementia type  No acute changes. Taking namenda 10 mg bid prior to admission, continue.      Tobacco abuse  Continues to smoke occasionally, encourage cessation. Declines nicotine patch.      Fluids: NS @ 10 ml/hr TKO  Electrolytes: Monitor  Nutrition: Clear liquids, advance as tolerated    DVT Prophylaxis: Pneumatic Compression Devices  Code Status: DNR / DNI - discussed directly with patient and his wife    Lines: Peripheral  Fry catheter: Not indicated    Disposition: Anticipate discharge in 2+ day(s). Appropriate for inpatient care.    Discussion: Assessment & plan discussed with Dr. Noah Mckeon.    Su Alcala PA-C  [facility]  Pager: [phone]          Primary Care Physician   [facility] [phone]    History is obtained from the patient, his wife, and review of old records via the EMR.    Past Medical History      Past Medical History:   Diagnosis Date     COPD (chronic obstructive pulmonary disease) (H)      Peripheral vascular disease (H)          Diagnosis Date Noted     Tobacco abuse 12/17/2018     Priority: Medium     Claudication (H) 02/01/2017     Priority: Medium     Arteriosclerotic vascular disease 02/01/2017     Priority: Medium     Essential hypertension, benign 02/01/2017     Priority: Medium     Hyperlipidemia LDL goal <70 02/01/2017     Priority: Medium     PVD (peripheral vascular disease) (H) 01/20/2017     Priority: Medium     Has stent in left leg.       Dementia without behavioral disturbance, unspecified dementia type 01/20/2017     Priority: Medium     Chronic obstructive pulmonary disease, unspecified COPD type (H) 01/01/2014     " Priority: Medium        Past Surgical History     Past Surgical History:   Procedure Laterality Date     AS REVISE THUMB TENDON Right      C BALLN ANGIOPLASTY OPEN,ILIAC  2016     COLONOSCOPY  2012     CV PERCUTANEOUS TRANSLUMINAL CORONARY ANGIOPLASTY  12/2016    No stent needed     TONSILLECTOMY & ADENOIDECTOMY          History of Present Illness   Chadwick Aguilar is a 73 year old male with the above past medical history now presents on 12/16/2018 with shortness of breath and decreased appetite.     He was in his usual state of health until 2 days prior to admission when he developed significantly worsening shortness of breath. He has had a cold for the past few days as well and has nasal congestion. No known fevers or chills at home. Yesterday his shortness of breath was significantly worse and he sat around at home. He was unable to ambulate around his home even short distances. He attempted albuterol nebs at home without improvement. He is on 2L O2 at home, uses it more at night than during the day. He has a cough that sounds productive but he is unable to cough up phlegm. His wife called EMS due to his dyspnea on exertion.    He denies orthopnea, PND, edema, or known weight changes. No known history of CHF.    His wife states that patient has had \"irregular heart beats\" in the past, but there is no apparent formal diagnosis of arrhythmia mentioned in his chart. His admit EKG was showing atrial fibrillation with ectopic beats. Discussed the recommendation for anticoagulation due to his CHADs VASc score of 4 - patient has transportation issues and would like to avoid coumadin and INR checks if possible. Pharmacy is looking into cost for DOACs, would ideally start Xarelto.    On review of systems patient denies fever or chills, but a cold virus is going through his household currently. He lost his appetite about 2 days ago and has not had much to eat or drink in the past 2 days. He denies chest pain or " palpitations. The remainder review of systems is negative.      Prior to Admission Medications   Prior to Admission Medications   Prescriptions Last Dose Informant Patient Reported? Taking?   Multiple Vitamins-Minerals (MULTIVITAMIN ADULT PO) 12/15/2018 at 1900  Yes Yes   Sig: Take 1 tablet by mouth every evening    Thiamine HCl (VITAMIN B-1 PO) 12/15/2018 at 1900  Yes Yes   Sig: Take 250 mg by mouth every evening    albuterol (2.5 MG/3ML) 0.083% neb solution 12/16/2018 at 0730  No Yes   Sig: Take 1 vial (2.5 mg) by nebulization every 6 hours as needed for shortness of breath / dyspnea or wheezing   aspirin 81 MG chewable tablet 12/15/2018 at 1900  No Yes   Sig: Take 1 tablet (81 mg) by mouth daily   Patient taking differently: Take 81 mg by mouth every evening    citalopram (CELEXA) 20 MG tablet 12/15/2018 at 1900  No Yes   Sig: Take 1 tablet (20 mg) by mouth daily   Patient taking differently: Take 20 mg by mouth every evening    clopidogrel (PLAVIX) 75 MG tablet 12/15/2018 at 1900  No Yes   Sig: Take 1 tablet (75 mg) by mouth daily   Patient taking differently: Take 75 mg by mouth every evening    fluticasone-salmeterol (ADVAIR DISKUS) 250-50 MCG/DOSE diskus inhaler 12/16/2018 at 0730  No Yes   Sig: Inhale 1 puff into the lungs 2 times daily   lisinopril (PRINIVIL/ZESTRIL) 40 MG tablet 12/15/2018 at 1900  No Yes   Sig: Take 1 tablet (40 mg) by mouth daily   Patient taking differently: Take 40 mg by mouth every evening    memantine (NAMENDA) 10 MG tablet 12/16/2018 at 0730  No Yes   Sig: Take 1 tablet (10 mg) by mouth 2 times daily   metoprolol succinate (TOPROL-XL) 100 MG 24 hr tablet 12/15/2018 at 1900  No Yes   Sig: Take 1 tablet (100 mg) by mouth daily   Patient taking differently: Take 100 mg by mouth every evening    nitroGLYcerin (NITROSTAT) 0.4 MG sublingual tablet More than a month at Unknown time  No No   Sig: For chest pain place 1 tablet under the tongue every 5 minutes for 3 doses. If symptoms persist  5 minutes after 1st dose call 911.   order for DME   No No   Sig: Equipment being ordered: shower chair   order for DME 2018 at Unknown time  No Yes   Sig: Equipment being ordered: Portable oxygen concentrator with O2 @ 2L NC PRN shortnes of breath.   simvastatin (ZOCOR) 10 MG tablet 12/15/2018 at 1900  No Yes   Sig: Take 1 tablet (10 mg) by mouth At Bedtime   tiotropium (SPIRIVA HANDIHALER) 18 MCG capsule 2018 at 0730  No Yes   Sig: Inhale 1 capsule (18 mcg) into the lungs daily Inhale contents of one capsule daily.      Facility-Administered Medications: None     Allergies   No Known Allergies    Family History    Family History   Problem Relation Age of Onset     No Known Problems Mother      No Known Problems Father        Social History   Social History     Socioeconomic History     Marital status:      Spouse name: Not on file     Number of children: Not on file     Years of education: Not on file     Highest education level: Not on file   Social Needs     Financial resource strain: Not on file     Food insecurity - worry: Not on file     Food insecurity - inability: Not on file     Transportation needs - medical: Not on file     Transportation needs - non-medical: Not on file   Occupational History     Not on file   Tobacco Use     Smoking status: Current Some Day Smoker     Years: 50.00     Types: Cigarettes     Last attempt to quit: 2016     Years since quittin.9     Smokeless tobacco: Never Used     Tobacco comment: uses patches PRN ? 1 a day occassionally   Substance and Sexual Activity     Alcohol use: Yes     Comment: occassionally     Drug use: No     Sexual activity: Not on file   Other Topics Concern     Parent/sibling w/ CABG, MI or angioplasty before 65F 55M? Not Asked   Social History Narrative     Not on file       Review of Systems     A complete 10 point review of systems was negative except for items noted in the HPI/subjective.      Physical Exam   /61 (BP  "Location: Right arm)   Pulse 104   Temp 97.7  F (36.5  C) (Oral)   Resp 20   Ht 1.676 m (5' 6\")   Wt 67.6 kg (149 lb 0.5 oz)   SpO2 95%   BMI 24.05 kg/m       Weight: 149 lbs .5 oz Body mass index is 24.05 kg/m .     Constitutional: Alert, oriented but at times forgetful, cooperative, no apparent distress, appears nontoxic, speaking in full sentences.     Eyes: Eyes are clear, pupils are reactive. No scleral icterus. Extroccular movements intact.     HEENT: Oropharynx is clear and moist, no lesions. Normocephalic, no evidence of cranial trauma.      Cardiovascular: Regular rhythm and rate, normal S1 and S2. No murmur, rubs, or gallops. Peripheral pulses in tact bilaterally. No lower extremity edema.    Respiratory: Expiratory wheezes and rhonchi bilaterally, no crackles.     GI: Soft, non-distended. Non-tender, no rebound or guarding. No hepatosplenomegaly or masses appreciated. Normal bowel sounds.     Musculoskeletal: Without obvious deformity, normal range of motion. Normal muscle bulk and tone.     Skin: Warm and dry, no rashes or ecchymoses. No mottling of skin.      Neurologic: Patient moves all extremities. Cranial nerves are grossly intact.  is symmetric. Gross strength and sensation are equal bilaterally.    Genitourinary: Deferred    Data   Data reviewed today:   Recent Labs   Lab 12/17/18  0510 12/16/18  1821   WBC 6.8 7.8   HGB 13.4 14.0   MCV 97 96    186    138   POTASSIUM 4.4 4.1   CHLORIDE 104 102   CO2 26 29   BUN 16 17   CR 0.78 0.86   ANIONGAP 8 7   MARY JO 8.0* 8.2*   * 147*   ALBUMIN  --  3.5   PROTTOTAL  --  7.0   BILITOTAL  --  0.5   ALKPHOS  --  41   ALT  --  23   AST  --  24   TROPI  --  0.017       Recent Results (from the past 24 hour(s))   Chest XR,  PA & LAT    Narrative    CHEST TWO VIEWS  12/16/2018 7:33 PM     HISTORY:  Shortness of breath.    COMPARISON: None.      Impression    IMPRESSION: Hyperinflation both lungs. The lungs are otherwise clear.  No " pleural effusions are identified. Heart size and pulmonary  vascularity are within normal limits. No pneumothorax. Aortic  calcification.    GRACIE NAVA MD   XR Chest 2 Views    Narrative    XR CHEST 2 VW 12/17/2018 5:59 AM    COMPARISON: 12/16/2018    HISTORY: Shortness of breath.      Impression    IMPRESSION: Apically predominant emphysema again seen. No focal  airspace disease identified on either side. No pleural effusion or  pneumothorax.    PENNY RODRIGUES MD       I personally reviewed the EKG tracing showing ectopy and atrial fibrillation.    Su Alcala PA-C  [facility]  Pager: [phone]

## 2018-12-17 NOTE — ED TRIAGE NOTES
"Has had cough and increasing SOB over past couple days has been using home resp meds and home O2 without relief.  Wife is primary care giver she reported to EMS he has been \"not wanting to take his rescue meds\"  EMS gave duo neb   Presents to ER with audible wheezes and tight cough\  "

## 2018-12-17 NOTE — PROGRESS NOTES
"[place] [facility] ADMISSION NOTE    Patient admitted to room 1007 at approximately 2350 via cart from emergency room. Patient was accompanied by nurse.     Verbal SBAR report received from Ancelmo MANDUJANO prior to patient arrival.     Patient trasferred to bed via self. Patient alert and oriented X 3. The patient is not having any pain.  . Admission vital signs: Blood pressure 143/73, pulse 104, temperature 99  F (37.2  C), temperature source Oral, resp. rate 20, height 1.676 m (5' 6\"), weight 70.3 kg (155 lb), SpO2 93 %. Patient was oriented to plan of care, call light, bed controls, tv, telephone, bathroom and visiting hours.     Risk Assessment    The following safety risks were identified during admission: fall. Yellow risk band applied: YES.     Skin Initial Assessment    This writer admitted this patient and completed a full skin assessment and Smith score in the Adult PCS flowsheet. Appropriate interventions initiated as needed.     Secondary skin check completed by Linda MANDUJANO.              Sarika Parra    "

## 2018-12-17 NOTE — PLAN OF CARE
Pt continues to have a frequent harsh productive cough. LS have expiratory wheezes throughout. On home dose of O2 @ 2L with sat's 92-95%. Pt appeared to sleep well overnight. Denied any discomfort. Pt is forgetful. Bed alarm on for safety. VSS. Afebrile.

## 2018-12-17 NOTE — PLAN OF CARE
Patient's spouse has been in the room all shift, she will help patient to bathroom, get him back to bed, etc. Staff have reminded the spouse that staff would like to be assisting the patient.  Patient walked in the colby once with RN to window door and back to room.  Patient has denied breathing issues this shift.

## 2018-12-18 LAB
ALBUMIN SERPL-MCNC: 3.1 G/DL (ref 3.4–5)
ALP SERPL-CCNC: 31 U/L (ref 40–150)
ALT SERPL W P-5'-P-CCNC: 25 U/L (ref 0–70)
ANION GAP SERPL CALCULATED.3IONS-SCNC: 3 MMOL/L (ref 3–14)
AST SERPL W P-5'-P-CCNC: 37 U/L (ref 0–45)
BILIRUB SERPL-MCNC: 0.3 MG/DL (ref 0.2–1.3)
BUN SERPL-MCNC: 18 MG/DL (ref 7–30)
CALCIUM SERPL-MCNC: 8.1 MG/DL (ref 8.5–10.1)
CHLORIDE SERPL-SCNC: 105 MMOL/L (ref 94–109)
CO2 SERPL-SCNC: 33 MMOL/L (ref 20–32)
CREAT SERPL-MCNC: 0.9 MG/DL (ref 0.66–1.25)
ERYTHROCYTE [DISTWIDTH] IN BLOOD BY AUTOMATED COUNT: 13.2 % (ref 10–15)
FLUAV H1 2009 PAND RNA SPEC QL NAA+PROBE: NEGATIVE
FLUAV H1 RNA SPEC QL NAA+PROBE: NEGATIVE
FLUAV H3 RNA SPEC QL NAA+PROBE: NEGATIVE
FLUAV RNA SPEC QL NAA+PROBE: NEGATIVE
FLUBV RNA SPEC QL NAA+PROBE: NEGATIVE
GFR SERPL CREATININE-BSD FRML MDRD: 82 ML/MIN/1.7M2
GLUCOSE SERPL-MCNC: 101 MG/DL (ref 70–99)
HADV DNA SPEC QL NAA+PROBE: NEGATIVE
HADV DNA SPEC QL NAA+PROBE: NEGATIVE
HCT VFR BLD AUTO: 42.3 % (ref 40–53)
HGB BLD-MCNC: 13.7 G/DL (ref 13.3–17.7)
HMPV RNA SPEC QL NAA+PROBE: NEGATIVE
HPIV1 RNA SPEC QL NAA+PROBE: NEGATIVE
HPIV2 RNA SPEC QL NAA+PROBE: NEGATIVE
HPIV3 RNA SPEC QL NAA+PROBE: NEGATIVE
MCH RBC QN AUTO: 31.3 PG (ref 26.5–33)
MCHC RBC AUTO-ENTMCNC: 32.4 G/DL (ref 31.5–36.5)
MCV RBC AUTO: 97 FL (ref 78–100)
MICROBIOLOGIST REVIEW: ABNORMAL
PLATELET # BLD AUTO: 193 10E9/L (ref 150–450)
POTASSIUM SERPL-SCNC: 4.6 MMOL/L (ref 3.4–5.3)
PROT SERPL-MCNC: 6.3 G/DL (ref 6.8–8.8)
RBC # BLD AUTO: 4.38 10E12/L (ref 4.4–5.9)
RHINOVIRUS RNA SPEC QL NAA+PROBE: NEGATIVE
RSV RNA SPEC QL NAA+PROBE: NEGATIVE
RSV RNA SPEC QL NAA+PROBE: POSITIVE
SODIUM SERPL-SCNC: 141 MMOL/L (ref 133–144)
SPECIMEN SOURCE: ABNORMAL
WBC # BLD AUTO: 9.8 10E9/L (ref 4–11)

## 2018-12-18 PROCEDURE — 25000125 ZZHC RX 250: Performed by: PHYSICIAN ASSISTANT

## 2018-12-18 PROCEDURE — 25000132 ZZH RX MED GY IP 250 OP 250 PS 637: Performed by: PHYSICIAN ASSISTANT

## 2018-12-18 PROCEDURE — 25000132 ZZH RX MED GY IP 250 OP 250 PS 637: Performed by: FAMILY MEDICINE

## 2018-12-18 PROCEDURE — 40000275 ZZH STATISTIC RCP TIME EA 10 MIN

## 2018-12-18 PROCEDURE — 94640 AIRWAY INHALATION TREATMENT: CPT

## 2018-12-18 PROCEDURE — 99233 SBSQ HOSP IP/OBS HIGH 50: CPT | Performed by: FAMILY MEDICINE

## 2018-12-18 PROCEDURE — 12000010 ZZH R&B MS INTERMEDIATE OVERFLOW

## 2018-12-18 PROCEDURE — 94640 AIRWAY INHALATION TREATMENT: CPT | Mod: 76

## 2018-12-18 PROCEDURE — 25000132 ZZH RX MED GY IP 250 OP 250 PS 637: Performed by: EMERGENCY MEDICINE

## 2018-12-18 PROCEDURE — 85027 COMPLETE CBC AUTOMATED: CPT | Performed by: PHYSICIAN ASSISTANT

## 2018-12-18 PROCEDURE — 36415 COLL VENOUS BLD VENIPUNCTURE: CPT | Performed by: PHYSICIAN ASSISTANT

## 2018-12-18 PROCEDURE — 80053 COMPREHEN METABOLIC PANEL: CPT | Performed by: PHYSICIAN ASSISTANT

## 2018-12-18 PROCEDURE — 25000132 ZZH RX MED GY IP 250 OP 250 PS 637: Performed by: INTERNAL MEDICINE

## 2018-12-18 RX ORDER — BENZTROPINE MESYLATE 1 MG/1
1-2 TABLET ORAL 3 TIMES DAILY PRN
Status: DISCONTINUED | OUTPATIENT
Start: 2018-12-18 | End: 2018-12-20 | Stop reason: HOSPADM

## 2018-12-18 RX ORDER — LABETALOL HYDROCHLORIDE 5 MG/ML
20 INJECTION, SOLUTION INTRAVENOUS EVERY 4 HOURS PRN
Status: DISCONTINUED | OUTPATIENT
Start: 2018-12-18 | End: 2018-12-20 | Stop reason: HOSPADM

## 2018-12-18 RX ORDER — RAMELTEON 8 MG/1
8 TABLET ORAL AT BEDTIME
Status: DISCONTINUED | OUTPATIENT
Start: 2018-12-18 | End: 2018-12-19

## 2018-12-18 RX ORDER — LEVOFLOXACIN 500 MG/1
500 TABLET, FILM COATED ORAL DAILY
Status: DISCONTINUED | OUTPATIENT
Start: 2018-12-18 | End: 2018-12-19

## 2018-12-18 RX ADMIN — METOPROLOL SUCCINATE 100 MG: 100 TABLET, EXTENDED RELEASE ORAL at 20:28

## 2018-12-18 RX ADMIN — IPRATROPIUM BROMIDE AND ALBUTEROL SULFATE 3 ML: .5; 3 SOLUTION RESPIRATORY (INHALATION) at 11:37

## 2018-12-18 RX ADMIN — LISINOPRIL 40 MG: 40 TABLET ORAL at 17:23

## 2018-12-18 RX ADMIN — RIVAROXABAN 20 MG: 10 TABLET, FILM COATED ORAL at 17:23

## 2018-12-18 RX ADMIN — MEMANTINE HYDROCHLORIDE 10 MG: 10 TABLET, FILM COATED ORAL at 08:33

## 2018-12-18 RX ADMIN — ACETAMINOPHEN 650 MG: 325 TABLET, FILM COATED ORAL at 21:30

## 2018-12-18 RX ADMIN — FLUTICASONE FUROATE AND VILANTEROL TRIFENATATE 1 PUFF: 100; 25 POWDER RESPIRATORY (INHALATION) at 08:33

## 2018-12-18 RX ADMIN — CITALOPRAM HYDROBROMIDE 20 MG: 20 TABLET ORAL at 17:23

## 2018-12-18 RX ADMIN — IPRATROPIUM BROMIDE AND ALBUTEROL SULFATE 3 ML: .5; 3 SOLUTION RESPIRATORY (INHALATION) at 06:36

## 2018-12-18 RX ADMIN — LEVOFLOXACIN 500 MG: 500 TABLET, FILM COATED ORAL at 08:33

## 2018-12-18 RX ADMIN — PREDNISONE 40 MG: 20 TABLET ORAL at 08:33

## 2018-12-18 RX ADMIN — ASPIRIN 81 MG 81 MG: 81 TABLET ORAL at 17:23

## 2018-12-18 RX ADMIN — MEMANTINE HYDROCHLORIDE 10 MG: 10 TABLET, FILM COATED ORAL at 20:28

## 2018-12-18 RX ADMIN — Medication 12.5 MG: at 23:37

## 2018-12-18 RX ADMIN — RAMELTEON 8 MG: 8 TABLET, FILM COATED ORAL at 21:30

## 2018-12-18 RX ADMIN — IPRATROPIUM BROMIDE AND ALBUTEROL SULFATE 3 ML: .5; 3 SOLUTION RESPIRATORY (INHALATION) at 15:27

## 2018-12-18 RX ADMIN — SIMVASTATIN 10 MG: 10 TABLET, FILM COATED ORAL at 21:30

## 2018-12-18 ASSESSMENT — PAIN DESCRIPTION - DESCRIPTORS: DESCRIPTORS: ACHING

## 2018-12-18 ASSESSMENT — ACTIVITIES OF DAILY LIVING (ADL)
ADLS_ACUITY_SCORE: 15

## 2018-12-18 ASSESSMENT — MIFFLIN-ST. JEOR: SCORE: 1360.75

## 2018-12-18 NOTE — TELEPHONE ENCOUNTER
Prior Authorization Approval    Authorization Effective Date: 11/18/2018  Authorization Expiration Date: 12/18/2019  Medication: Xarelto 20mg-APPROVED  Approved Dose/Quantity:    Reference #: [id number]   Insurance Company: [employer] - Phone [phone] Fax [phone]  Expected CoPay:       CoPay Card Available:      Foundation Assistance Needed:    Which Pharmacy is filling the prescription (Not needed for infusion/clinic administered): [facility] [place], [place] - [address]  Pharmacy Notified: Yes  Patient Notified: Yes

## 2018-12-18 NOTE — PROGRESS NOTES
[facility]      Subjective:  Nurses note severe nicholson   Confused -pulled out iv again over night  Coughing  Pulls off heart monitor  No fever      Review of Systems:  CONSTITUTIONAL: NEGATIVE for fever, chills, change in weight  INTEGUMENTARY/SKIN: NEGATIVE for worrisome rashes, moles or lesions  EYES: NEGATIVE for vision changes or irritation  ENT/MOUTH: NEGATIVE for ear, mouth and throat problems  RESP:cough, sog  BREAST: NEGATIVE for masses, tenderness or discharge  CV: NEGATIVE for chest pain, palpitations or peripheral edema  GI: NEGATIVE for nausea, abdominal pain, heartburn, or change in bowel habits  : NEGATIVE for frequency, dysuria, or hematuria  MUSCULOSKELETAL: NEGATIVE for significant arthralgias or myalgia  NEURO: confusion  ENDOCRINE: NEGATIVE for temperature intolerance, skin/hair changes  HEME: NEGATIVE for bleeding problems    Physical Exam:  Vitals Were Reviewed    Patient Vitals for the past 16 hrs:   BP Temp Temp src Pulse Heart Rate Resp SpO2 Weight   12/18/18 0600 -- -- -- -- -- -- -- 68.3 kg (150 lb 9.2 oz)   12/18/18 0432 -- -- -- -- -- -- 95 % --   12/18/18 0430 167/86 98.2  F (36.8  C) Oral -- 64 18 97 % --   12/18/18 0020 145/72 98.4  F (36.9  C) Oral -- 62 16 95 % --   12/17/18 1950 (!) 174/98 98.6  F (37  C) Oral 81 -- 16 94 % --   12/17/18 1938 -- -- -- -- -- -- 94 % --   12/17/18 1714 154/74 -- -- -- -- 16 -- --   12/17/18 1609 -- -- -- -- -- -- 91 % --   12/17/18 1549 (!) 206/82 -- -- 84 84 -- 91 % --   12/17/18 1547 (!) 201/143 98.7  F (37.1  C) Oral 92 92 22 (!) 88 % --   12/17/18 1546 (!) 201/143 -- -- -- -- -- -- --   12/17/18 1537 -- -- -- -- 80 -- -- --         Intake/Output Summary (Last 24 hours) at 12/18/2018 0723  Last data filed at 12/18/2018 0000  Gross per 24 hour   Intake 1655 ml   Output --   Net 1655 ml       GENERAL APPEARANCE: confused, gets dyspneic with any exertion  EYES: conjunctiva clear, eyes grossly normal  RESP: exp wheeze , bibasilar  crackles  CV: regular rate and rhythm, normal S1 S2, no S3 or S4 and no murmur, click or rub   ABDOMEN: soft, nontender, no HSM or masses and bowel sounds normal  MS: no clubbing, cyanosis;  edema  SKIN: clear without significant rashes or lesions  NEURO: alert, confused to time and place    Lab:  Recent Labs   Lab Test 12/18/18  0532 12/17/18  0510    138   POTASSIUM 4.6 4.4   CHLORIDE 105 104   CO2 33* 26   ANIONGAP 3 8   * 187*   BUN 18 16   CR 0.90 0.78   MARY JO 8.1* 8.0*     CBC RESULTS:   Recent Labs   Lab Test 12/18/18  0532 12/17/18  0510   WBC 9.8 6.8   RBC 4.38* 4.28*   HGB 13.7 13.4   HCT 42.3 41.5    171       Results for orders placed or performed during the hospital encounter of 12/16/18 (from the past 24 hour(s))   Care Transition RN/SW IP Consult    Narrative    Rita Mcclelland RN     12/17/2018  1:53 PM  Care Transition Initial Assessment - RN      Met with: Patient and Family.    DATA   Principal Problem:    COPD exacerbation (H)  Active Problems:    PVD (peripheral vascular disease) (H)    Chronic obstructive pulmonary disease, unspecified COPD type   (H)    Dementia without behavioral disturbance, unspecified dementia   type    Arteriosclerotic vascular disease    Essential hypertension, benign    Hyperlipidemia LDL goal <70    Paroxysmal atrial fibrillation (H)    Tobacco abuse       Primary Care Clinic [facility]: [facility]  Primary Care MD Name: Reinier  Contact information and PCP information verified: Yes    ASSESSMENTCognitive Status: awake and alert.                      Description of Support System: Supportive, Involved   Who is your support system?: Wife(grandchildren)   Support Assessment: Adequate family and caregiver support   Insurance Concerns: No Insurance issues identified      This writer met with pt and spuse, introduced self and role.   Discussed discharge planning and Medicare guidelines in regards   to home care and SNF benefits. Patient lives independently  with   his wife. Patient continues to drive. Discussed home care and   patient declined. Denied any needs on discharge. Copd education   and action plan given.     PLAN    Home      Discharge Planner   Discharge Plans in progress: Home  Barriers to discharge plan: clinical improvement  Follow up plan: per MD       Entered by: MATT MARISCAL 2018 1:51 PM         Rita Mcclelland, MSN, RN, Care Coordinator  [facility] [phone]  [facility] [phone]   Echocardiogram Complete    Narrative    [id number]  [id number]  [id number]  [id number]^CECIL^KAILA^ELIECER           [facility]  Echocardiography Laboratory  [address] [place] [address]        Name: DALLAS MATTHEW  MRN: [id number]  : 1945  Study Date: 2018 03:02 PM  Age: 73 yrs  Gender: Male  Patient Location: [facility]  Reason For Study: Afib  Ordering Physician: KAILA JACOB  Referring Physician: Jessi Hills  Performed By: Tanya Hidalgo     BSA: 1.8 m2  Height: 66 in  Weight: 149 lb  HR: 84  BP: 143/73 mmHg  _____________________________________________________________________________  __        Procedure  Complete Echo Adult.  _____________________________________________________________________________  __        Interpretation Summary     1. The left ventricle is normal in structure, function and size. The visual  ejection fraction is estimated at 65%.  2. The right ventricle is normal in structure, function and size.  3. There is mild to moderate (1-2+) mitral regurgitation.  4. Mild aortic root dilatation (sinus of Valsalva 4.3cm).     No previous echo for comparison.  _____________________________________________________________________________  __        Left Ventricle  The left ventricle is normal in structure, function and size. There is normal  left ventricular wall thickness. The visual ejection fraction is estimated at  65%. Diastolic function not assessed due to arrhythmia. Normal left  ventricular wall  motion.     Right Ventricle  The right ventricle is normal in structure, function and size.     Atria  The left atrium is mildly dilated. Right atrial size is normal. There is no  atrial shunt seen.     Mitral Valve  There is mild to moderate (1-2+) mitral regurgitation.        Tricuspid Valve  There is mild (1+) tricuspid regurgitation. The right ventricular systolic  pressure is approximated at 39.4 mmHg plus the right atrial pressure.     Aortic Valve  The aortic valve is trileaflet. There is mild trileaflet aortic sclerosis. No  aortic stenosis is present.     Pulmonic Valve  The pulmonic valve is normal in structure and function.     Vessels  Mild aortic root dilatation. Normal ascending, transverse (arch), and  descending aorta. The IVC is normal in size and reactivity with respiration,  suggesting normal central venous pressure.     Pericardium  There is no pericardial effusion.        Rhythm  The rhythm was undetermined.  _____________________________________________________________________________  __  MMode/2D Measurements & Calculations  IVSd: 0.99 cm     LVIDd: 4.2 cm  LVIDs: 2.9 cm  LVPWd: 1.1 cm  FS: 29.3 %  LV mass(C)d: 143.4 grams  LV mass(C)dI: 81.2 grams/m2  Ao root diam: 4.3 cm  LA Volume (BP): 64.0 ml  LA Volume Index (BP): 36.4 ml/m2  RWT: 0.53        Doppler Measurements & Calculations  MV E max colleen: 78.5 cm/sec  MV A max colleen: 67.6 cm/sec  MV E/A: 1.2     TR max colleen: 313.8 cm/sec  TR max P.4 mmHg  E/E' av.3  Lateral E/e': 8.3  Medial E/e': 14.3           _____________________________________________________________________________  __           Report approved by: Sammie Kruger 2018 04:17 PM      Blood gas venous   Result Value Ref Range    Ph Venous 7.33 7.32 - 7.43 pH    PCO2 Venous 51 (H) 40 - 50 mm Hg    PO2 Venous 51 (H) 25 - 47 mm Hg    Bicarbonate Venous 27 21 - 28 mmol/L    Base Excess Venous 0.3 mmol/L    FIO2 Hide    CBC with platelets   Result Value Ref  Range    WBC 9.8 4.0 - 11.0 10e9/L    RBC Count 4.38 (L) 4.4 - 5.9 10e12/L    Hemoglobin 13.7 13.3 - 17.7 g/dL    Hematocrit 42.3 40.0 - 53.0 %    MCV 97 78 - 100 fl    MCH 31.3 26.5 - 33.0 pg    MCHC 32.4 31.5 - 36.5 g/dL    RDW 13.2 10.0 - 15.0 %    Platelet Count 193 150 - 450 10e9/L   Comprehensive metabolic panel   Result Value Ref Range    Sodium 141 133 - 144 mmol/L    Potassium 4.6 3.4 - 5.3 mmol/L    Chloride 105 94 - 109 mmol/L    Carbon Dioxide 33 (H) 20 - 32 mmol/L    Anion Gap 3 3 - 14 mmol/L    Glucose 101 (H) 70 - 99 mg/dL    Urea Nitrogen 18 7 - 30 mg/dL    Creatinine 0.90 0.66 - 1.25 mg/dL    GFR Estimate 82 >60 mL/min/1.7m2    GFR Estimate If Black >90 >60 mL/min/1.7m2    Calcium 8.1 (L) 8.5 - 10.1 mg/dL    Bilirubin Total 0.3 0.2 - 1.3 mg/dL    Albumin 3.1 (L) 3.4 - 5.0 g/dL    Protein Total 6.3 (L) 6.8 - 8.8 g/dL    Alkaline Phosphatase 31 (L) 40 - 150 U/L    ALT 25 0 - 70 U/L    AST 37 0 - 45 U/L       Assessment and Plan:    Chadwick Aguilar is a 73 year old male with a past medical history significant for peripheral vascular disease s/p iliac and femoral stents, arteriosclerotic vascular disease, oxygen dependent COPD, dyslipidemia, hypertension, dementia, and tobacco abuse who presents on 12/16/2018 with shortness of breath found to have COPD exacerbation and new atrial fibrillation.        COPD exacerbation secondary to RSV  Chronic obstructive pulmonary disease, unspecified COPD type  Oxygen dependent on 2L at baseline, mostly uses at night. Managed prior to admission on Advair 250, Spiriva, and prn albuterol. Presents with increased wheezing, no hypoxia at baseline O2 needs. Afebrile, no leukocytosis. Chest x-ray showing bilateral hyperinflation and aortic calcifications, but lungs otherwise clear. Initial VBG respiratory acidosis - pH 7.25 / pCO2 69 / pO2 17 / bicarb 30 - repeat VBGs improving slightly. Admit BNP 2222, but otherwise appears euvolemic without evidence of heart failure.  "Presentation most consistent with COPD exacerbation, although new atrial fibrillation (see below) may also be contributing to respiratory status. Patient was given DuoNebs, Solumedrol, ceftriaxone, and azithromycin in the emergency department.  On ceftriaxone and azithromycin, pred 40, nebs, holding spiriva while on scheduled duoneb.    Currently 7.33/51, sat in 90's off oxygen, dyspneic with any exertion     Paroxysmal atrial fibrillation - new diagnosis  Admit EKG demonstrating atrial fibrillation with ectopic beats. Patient with reported history of \"abnormal rhythm,\" but review of records does not show formal diagnosis of atrial fibrillation, and brief review of prior EKGs and cardiac testing show normal sinus rhythm. Is on antiplatelets but not anticoagulation prior to admission. Taking metoprolol  mg daily prior to admission. CHADs-VASc = 4.  Metoprolol increased to 125 mg daily yesterday--change back to 100 mg to try to simply things for discontinue.  Xarelto - started, plavix stopped and asa continued.  Echo with normal EF and mild to moderate MR    Decreased appetite  Not eating or drinking much for 2 days prior to admission. Not interested in eating much at meals currently.  - Encouraged him to sip on Boost throughout the day  - Clear liquid diet ordered, may advance as tolerated      PVD (peripheral vascular disease)  Arteriosclerotic vascular disease  Hyperlipidemia LDL goal <70  History of right iliac and femoral stents in 2016. History of coronary angiogram in 2016 but PCI not needed. Lexiscan in 2015 showing 10% stenosis of LAD, 40% stenosis of RCA - medically managed, follows with Dr. Sheron Berry. Managed prior to admission with aspirin 81 daily, Plavix 75 mg daily, metoprolol  mg daily, Zocor 10 mg daily, lisinopril 40 mg daily, and prn nitroglycerine.  - Increase metoprolol to 125 mg daily as above  - Continue prior to admission aspirin  Xarelto started and asa stoped.    Essential " hypertension, benign  Pressures reviewed, mildly elevated but stable. Managed prior to admission with metoprolol 100 mg daily and lisinopril 40 mg daily.   Metoprolol and Lisinopril continued.  bp running higher probably due to steroids-prn labetalol ordered.       Dementia without behavioral disturbance, unspecified dementia type/some acute toxic encephalopathy duet to illness.  No acute changes. Taking namenda 10 mg bid prior to admission, continue.      Tobacco abuse  Continues to smoke occasionally, encourage cessation. Declines nicotine patch.        Fluids: lock  Electrolytes: Monitor  Nutrition: Clear liquids, advance as tolerated     DVT Prophylaxis: Pneumatic Compression Devices  Code Status: DNR / DNI - discussed directly with patient and his wife     Lines: Peripheral  Fry catheter: Not indicated     Plan- he is quite dyspneic with any exertion, not safe for discharge, unable to keep iv in, will switch to levaquin and continue in patient care. 1-2 more days. Resp virus panel pending. Reducing the metoprolol dose to try to simply meds for future discharge.    2:49 PM   Pt is pos for RSV type a    3:08 PM discussed with Dr Corrales (sp) [place] heart cards  Ok for either asa/xarelto or plavix/zarelto---will use the former.

## 2018-12-18 NOTE — CONSULTS
"CLINICAL NUTRITION SERVICES  -  ASSESSMENT NOTE     REASON FOR ASSESSMENT  Chadwick Aguilar is a 73 year old male seen by Registered Dietitian for RN Consult - patient usually drinks Ensure at home all day for nutrition      NUTRITION HISTORY  - Information obtained from the patient's wife, spoke with her yesterday. Patient unable to provide nutrition history due to confusion. The patient's wife reports that the patient has not eaten for 2 days. She reports that his usual breakfast is spam and scrambled eggs with cheese, he likes spicy V8 juice. She states that he likes to drink [employer] high protein shakes at home, he prefers chocolate flavor. He will eat canned fruit. He does not like apple juice. He likes ice cream.      CURRENT NUTRITION ORDERS  Diet Order:     Regular with ground meat/gravy    Current Intake/Tolerance:  The patient ate 100% of his breakfast today, 75% of his supper yesterday      PHYSICAL FINDINGS  Obtained from Chart/Interdisciplinary Team  None noted    ANTHROPOMETRICS  Height: 5' 6\"  Weight: 150 lbs 9.19 oz  Body mass index is 24.3 kg/m .  Weight Status:  Normal BMI  IBW: 142#s  % IBW: 106%  Weight History:   Wt Readings from Last 10 Encounters:   12/18/18 68.3 kg (150 lb 9.2 oz)   08/20/18 71.9 kg (158 lb 9.6 oz)   08/10/18 70 kg (154 lb 6.4 oz)   01/23/18 71.2 kg (157 lb)   12/06/17 67.2 kg (148 lb 3.2 oz)   11/29/17 67.5 kg (148 lb 12.8 oz)   09/07/17 66.5 kg (146 lb 9.6 oz)   09/06/17 66.1 kg (145 lb 12.8 oz)   08/09/17 63.5 kg (140 lb)   03/17/17 60.6 kg (133 lb 9.6 oz)         LABS  Labs reviewed    MEDICATIONS  Medications reviewed      ASSESSED NUTRITION NEEDS PER APPROVED PRACTICE GUIDELINES:    Dosing Weight 68 kg  Estimated Energy Needs: 9884-1721 kcals (25-30 Kcal/Kg)  Justification: maintenance  Estimated Protein Needs: 68-82 grams protein (1-1.2 g pro/Kg)  Justification: preservation of lean body mass  Estimated Fluid Needs: 1 mL/Kcal  Justification: " maintenance    MALNUTRITION:  % Weight Loss:  Weight loss does not meet criteria for malnutrition   % Intake:  Decreased intake does not meet criteria for malnutrition   Subcutaneous Fat Loss:  Unable to assess at this time due to patient confused  Muscle Loss:  Unable to assess at this time due to patient confused  Fluid Retention:  No lower extremity edema per provider note    Malnutrition Diagnosis: Unable to determine due to unable to assess muscle and fat loss due to the patient is confused    NUTRITION DIAGNOSIS:  Inadequate protein-energy intake related to decreased appetite as evidenced by reported very poor oral intake for 2 days prior to admission.      NUTRITION INTERVENTIONS  Recommendations / Nutrition Prescription  High calorie, high protein oral nutrition supplement TID  Provide soft foods that the patient can easily chew.      Implementation  Nutrition education: Not appropriate at this time due to patient condition  Composition of Meals and Snacks and Medical Food Supplement  .      Nutrition Goals  Patient to consume % of his meals in 1-2 days.  .      MONITORING AND EVALUATION:  Progress towards goals will be monitored and evaluated per protocol and Practice Guidelines, Food intake and Liquid meal replacement or supplement intake    Katerina Elliott RD,LD  Clinical Dietitian

## 2018-12-18 NOTE — PROGRESS NOTES
Reason for Follow up: DC planning    Anticipated discharge needs: This writer met with pt and with pts wife, Zuri to further discuss discharge planning as pt is confused. Wife reports that he will most likely do better when he returns home. She understands that his dementia will most likely progress but she wants to care for him as long as possible at home. She declines home care at this time. She was agreeable for information on [facility], she was not interested in this writer making a direct referral to [facility]. Information provided in pts discharge instructions.     As a system [facility] wants to ensure that across the care continuum that you have support through care coordination services that include nurses and social workers in the outpatient setting.  Due to your COPD I feel it is important you have this support when you discharge.  They will be calling you within 24-48 hours of your discharge.   A brochure describing the services was provided.  If you have questions you can reach out to your clinic directly and ask for the Care Coordinator assigned to your care      Next steps: DC home when stable    Discharge Planner   Discharge Plans in progress: home  Barriers to discharge plan: medical stability  Follow up plan: CTS to follow prn       Entered by: Elsy Puentes 12/18/2018 10:41 AM           Elsy SEN, LICSW, [facility] [phone]

## 2018-12-18 NOTE — PROGRESS NOTES
Wife went home and since patient has been restless, getting up to edge of bed and bed alarm goes off.  He has been instructed in the call light use repeatedly, also telemetry has been pulled off by patient repeated this shift.  Just got up and IV was pulled out.  Two Staff nurses have each attempted to restart IV X 2 and third RN trying now.  Able to get into IV but when advancing IV is blown and he is on blood thinners and bleeding noted under the skin with small hematomaes.  Each site has need to have pressure applied for short time to ensure bleeding is contained and then dressing to site.  Will need to continue to maintain bed alarm and chair alarm if up to chair for patient safety and bleeding risk.  This writer has had patient up to bathroom and out to hallway to walk with some mild dyspnea especially if he tries to hurry.  plan of care continues.

## 2018-12-18 NOTE — PROGRESS NOTES
ED nurse came over to place an IV in pt's right wrist, antibiotics were completed.  Pt was then saline locked and his IV was wrapped with Kerlix.  Pt pulled that one out just before 0100.  Clint Crowe was in the ICU and gave the ok to leave the IV out until AM when his antibiotics could be addressed.  Will continue to monitor close for changes.

## 2018-12-18 NOTE — PROGRESS NOTES
Pt had been taking off his oxygen, on RA his sat's have been 94% or greater.  Will continue to monitor close for changes.

## 2018-12-18 NOTE — PLAN OF CARE
Patient ate all of his breakfast and 75% of lunch.  Patient walked in [place] once to the window door and back and was short of air, oxygen sats 88%. When patient's spouse is out of the room patient is more confused and disoriented,  patient is redirectable, VPM machine started because patient was constantly setting off chair alarms.

## 2018-12-18 NOTE — TELEPHONE ENCOUNTER
Prior Authorization Retail Medication Request    Medication/Dose: Xarelto 20mg Tablets  ICD code (if different than what is on RX):    Previously Tried and Failed:  n/a  Rationale:  New prescription - ordered by hospitalist     Insurance Name:  [phone]  Insurance ID:  [id number]      Pharmacy Information (if different than what is on RX)  Name:    Phone:

## 2018-12-18 NOTE — PROGRESS NOTES
"This writer received a call, patient's wife went to [facility] pharmacy to  an inhaler and was also given a prescription for xarelto. The MD had not ordered this medication for discharge and patient did not have discharge orders yet, unsure why medication was dispensed. Antonia (pharmacist) explained that patient's wife was not charged for the medication and this was \"a stock\" bottle, unopened. Antonia was able to take the medication back. Patient's wife updated.  "

## 2018-12-18 NOTE — DISCHARGE INSTRUCTIONS
As a system [facility] wants to ensure that across the care continuum that you have support through care coordination services that include nurses and social workers in the outpatient setting.  Due to your COPD I feel it is important you have this support when you discharge.  They will be calling you within 24-48 hours of your discharge.   A brochure describing the services was provided.  If you have questions you can reach out to your clinic directly and ask for the Care Coordinator assigned to your care      [facility]ior [facility] Line  Call the experts at [facility], a free one stop shop for [place] Seniors  Free, objective and comprehensive counseling provided by trained professionals  Long term care options counselors available Monday-Friday 8:00am-4:30pm    Call [phone]  [email or web address]

## 2018-12-18 NOTE — PLAN OF CARE
"Pt still is having frequent periods of confusion on \"where he is, why he is here, how did he get here, does his wife know he's here, where is his car so he can drive home?\"  Pt's lungs sounds are diminished with exp wheezes, he has a harsh frequent cough, when he is up to  the bathroom he get's very dyspneic.  Pt has remained on RA with his sat's >92% when he is resting.  HR has been 60-70's, -145.  Will continue to monitor close for changes.  "

## 2018-12-18 NOTE — TELEPHONE ENCOUNTER
Central Prior Authorization Team   Phone: [phone]    PA Initiation    Medication: Xarelto 20mg Tablets  Insurance Company: [employer] - Phone [phone] Fax [phone]  Pharmacy Filling the Rx: [place], [place] - [address]  Filling Pharmacy Phone: [phone]  Filling Pharmacy Fax: [phone]  Start Date: 12/18/2018

## 2018-12-19 LAB
ANION GAP SERPL CALCULATED.3IONS-SCNC: 6 MMOL/L (ref 3–14)
BASE EXCESS BLDV CALC-SCNC: 6.3 MMOL/L
BUN SERPL-MCNC: 19 MG/DL (ref 7–30)
CALCIUM SERPL-MCNC: 8.1 MG/DL (ref 8.5–10.1)
CHLORIDE SERPL-SCNC: 106 MMOL/L (ref 94–109)
CO2 SERPL-SCNC: 31 MMOL/L (ref 20–32)
CREAT SERPL-MCNC: 0.82 MG/DL (ref 0.66–1.25)
ERYTHROCYTE [DISTWIDTH] IN BLOOD BY AUTOMATED COUNT: 13.4 % (ref 10–15)
GFR SERPL CREATININE-BSD FRML MDRD: 86 ML/MIN/{1.73_M2}
GLUCOSE SERPL-MCNC: 92 MG/DL (ref 70–99)
HCO3 BLDV-SCNC: 33 MMOL/L (ref 21–28)
HCT VFR BLD AUTO: 38.5 % (ref 40–53)
HGB BLD-MCNC: 12.4 G/DL (ref 13.3–17.7)
MCH RBC QN AUTO: 31 PG (ref 26.5–33)
MCHC RBC AUTO-ENTMCNC: 32.2 G/DL (ref 31.5–36.5)
MCV RBC AUTO: 96 FL (ref 78–100)
O2/TOTAL GAS SETTING VFR VENT: ABNORMAL %
PCO2 BLDV: 58 MM HG (ref 40–50)
PH BLDV: 7.37 PH (ref 7.32–7.43)
PLATELET # BLD AUTO: 187 10E9/L (ref 150–450)
PO2 BLDV: 37 MM HG (ref 25–47)
POTASSIUM SERPL-SCNC: 4.1 MMOL/L (ref 3.4–5.3)
RBC # BLD AUTO: 4 10E12/L (ref 4.4–5.9)
SODIUM SERPL-SCNC: 143 MMOL/L (ref 133–144)
WBC # BLD AUTO: 7 10E9/L (ref 4–11)

## 2018-12-19 PROCEDURE — 25000132 ZZH RX MED GY IP 250 OP 250 PS 637: Performed by: PHYSICIAN ASSISTANT

## 2018-12-19 PROCEDURE — 94640 AIRWAY INHALATION TREATMENT: CPT

## 2018-12-19 PROCEDURE — 25000132 ZZH RX MED GY IP 250 OP 250 PS 637: Performed by: INTERNAL MEDICINE

## 2018-12-19 PROCEDURE — 80048 BASIC METABOLIC PNL TOTAL CA: CPT | Performed by: FAMILY MEDICINE

## 2018-12-19 PROCEDURE — 82803 BLOOD GASES ANY COMBINATION: CPT | Performed by: FAMILY MEDICINE

## 2018-12-19 PROCEDURE — 36415 COLL VENOUS BLD VENIPUNCTURE: CPT | Performed by: FAMILY MEDICINE

## 2018-12-19 PROCEDURE — 12000010 ZZH R&B MS INTERMEDIATE OVERFLOW

## 2018-12-19 PROCEDURE — 99233 SBSQ HOSP IP/OBS HIGH 50: CPT | Performed by: FAMILY MEDICINE

## 2018-12-19 PROCEDURE — 25000125 ZZHC RX 250: Performed by: PHYSICIAN ASSISTANT

## 2018-12-19 PROCEDURE — 25000132 ZZH RX MED GY IP 250 OP 250 PS 637: Performed by: FAMILY MEDICINE

## 2018-12-19 PROCEDURE — 40000270 ZZH STATISTIC OXYGEN  O2DAILY TECH TIME

## 2018-12-19 PROCEDURE — 85027 COMPLETE CBC AUTOMATED: CPT | Performed by: FAMILY MEDICINE

## 2018-12-19 PROCEDURE — 94640 AIRWAY INHALATION TREATMENT: CPT | Mod: 76

## 2018-12-19 PROCEDURE — 40000275 ZZH STATISTIC RCP TIME EA 10 MIN

## 2018-12-19 RX ORDER — GUAIFENESIN/DEXTROMETHORPHAN 100-10MG/5
5 SYRUP ORAL EVERY 4 HOURS PRN
Status: DISCONTINUED | OUTPATIENT
Start: 2018-12-19 | End: 2018-12-20 | Stop reason: HOSPADM

## 2018-12-19 RX ORDER — QUETIAPINE FUMARATE 25 MG/1
25 TABLET, FILM COATED ORAL AT BEDTIME
Status: DISCONTINUED | OUTPATIENT
Start: 2018-12-19 | End: 2018-12-19

## 2018-12-19 RX ADMIN — CITALOPRAM HYDROBROMIDE 20 MG: 20 TABLET ORAL at 18:17

## 2018-12-19 RX ADMIN — FLUTICASONE FUROATE AND VILANTEROL TRIFENATATE 1 PUFF: 100; 25 POWDER RESPIRATORY (INHALATION) at 08:30

## 2018-12-19 RX ADMIN — PREDNISONE 40 MG: 20 TABLET ORAL at 08:29

## 2018-12-19 RX ADMIN — Medication 12.5 MG: at 21:53

## 2018-12-19 RX ADMIN — RIVAROXABAN 20 MG: 10 TABLET, FILM COATED ORAL at 18:18

## 2018-12-19 RX ADMIN — MEMANTINE HYDROCHLORIDE 10 MG: 10 TABLET, FILM COATED ORAL at 20:44

## 2018-12-19 RX ADMIN — METOPROLOL SUCCINATE 100 MG: 100 TABLET, EXTENDED RELEASE ORAL at 20:44

## 2018-12-19 RX ADMIN — ASPIRIN 81 MG 81 MG: 81 TABLET ORAL at 18:17

## 2018-12-19 RX ADMIN — SIMVASTATIN 10 MG: 10 TABLET, FILM COATED ORAL at 21:53

## 2018-12-19 RX ADMIN — MEMANTINE HYDROCHLORIDE 10 MG: 10 TABLET, FILM COATED ORAL at 08:30

## 2018-12-19 RX ADMIN — IPRATROPIUM BROMIDE AND ALBUTEROL SULFATE 3 ML: .5; 3 SOLUTION RESPIRATORY (INHALATION) at 11:02

## 2018-12-19 RX ADMIN — IPRATROPIUM BROMIDE AND ALBUTEROL SULFATE 3 ML: .5; 3 SOLUTION RESPIRATORY (INHALATION) at 06:31

## 2018-12-19 RX ADMIN — LISINOPRIL 40 MG: 40 TABLET ORAL at 18:17

## 2018-12-19 RX ADMIN — IPRATROPIUM BROMIDE AND ALBUTEROL SULFATE 3 ML: .5; 3 SOLUTION RESPIRATORY (INHALATION) at 19:45

## 2018-12-19 RX ADMIN — LEVOFLOXACIN 500 MG: 500 TABLET, FILM COATED ORAL at 08:29

## 2018-12-19 RX ADMIN — IPRATROPIUM BROMIDE AND ALBUTEROL SULFATE 3 ML: .5; 3 SOLUTION RESPIRATORY (INHALATION) at 15:38

## 2018-12-19 ASSESSMENT — ACTIVITIES OF DAILY LIVING (ADL)
ADLS_ACUITY_SCORE: 15

## 2018-12-19 ASSESSMENT — MIFFLIN-ST. JEOR: SCORE: 1357.75

## 2018-12-19 NOTE — PLAN OF CARE
"Continues to have VPM monitor in room. Patient will not keep his supplemental oxygen on, he is continually removing th tubing out of his nose and tying the tubing up.  Patient currently is redirectable, oriented to self only. Patient refused supper, stating \"I already had supper\".  Patient is talking about his job, stating \"I have to leave to go to work\", will continue to attempt to reorient patient to surroundings.    "

## 2018-12-19 NOTE — PROGRESS NOTES
"Pt had been very restless, taking off his oxygen and wrapping the tubing up and lying it in in bed, folding his blankets multiple times, setting off his bed alarm to \"go upstairs to bed,\" or \"I need to find my car so I can drive home, Zuri is probably wondering where I am.\"  Pt received his scheduled Rozerem at 2130, he was still wide awake and as restless at 2300. At 2337 pt was given 12.5 mg of Seroquel, shortly after he began to relax, he stopped picking at his linen, and finally left his blankets and oxygen on.  Pt was sleeping comfortably by 0000.  Will continue to monitor close for changes.  "

## 2018-12-19 NOTE — PLAN OF CARE
"Pt's lung sounds are diminished t/o with exp wheezes, he has remained on 2 L of oxygen t/o the night, his sat's at rest are 94% or greater, with activity he does de-sat into the low 80's he does get dyspneic.  Pt did sleep well after he received Seroqel, he was woke at 0600 for his lab draw.  Pt stated \"he wanted to go back to sleep because he was still tired.\"  Pt was asked if needed to get up and use the bathroom and he said \"no.\"  VPM monitor on in room, bed alarm on, call light with in reach.  Will continue to monitor close for changes.  "

## 2018-12-19 NOTE — PROGRESS NOTES
[facility]      Subjective:  Coughing  Difficulty breathing  No fever  Wife anxious for discontinue-but drops sats into 70's when he pulls oxygen off    Review of Systems:  CONSTITUTIONAL: NEGATIVE for fever, chills, change in weight  INTEGUMENTARY/SKIN: NEGATIVE for worrisome rashes, moles or lesions  EYES: NEGATIVE for vision changes or irritation  ENT/MOUTH: NEGATIVE for ear, mouth and throat problems  RESP:cough, sob  BREAST: NEGATIVE for masses, tenderness or discharge  CV: NEGATIVE for chest pain, palpitations or peripheral edema  GI: NEGATIVE for nausea, abdominal pain, heartburn, or change in bowel habits  : NEGATIVE for frequency, dysuria, or hematuria  MUSCULOSKELETAL: NEGATIVE for significant arthralgias or myalgia  NEURO: confused  HEME: NEGATIVE for bleeding problems  PSYCHIATRIC: NEGATIVE for changes in mood or affect    Physical Exam:  Vitals Were Reviewed    Patient Vitals for the past 16 hrs:   BP Temp Temp src Heart Rate Resp SpO2 Weight   12/19/18 0615 172/84 97.6  F (36.4  C) Oral 63 16 97 % 68 kg (149 lb 14.6 oz)   12/18/18 2315 166/82 98  F (36.7  C) Oral 64 18 95 % --   12/18/18 2050 -- -- -- -- -- 93 % --   12/18/18 2025 -- -- -- -- -- 95 % --   12/18/18 2020 (!) 179/122 98.6  F (37  C) Oral 84 18 -- --   12/18/18 1710 (!) 203/92 97.4  F (36.3  C) Oral 62 -- 91 % --         Intake/Output Summary (Last 24 hours) at 12/19/2018 0750  Last data filed at 12/18/2018 2330  Gross per 24 hour   Intake 1010 ml   Output --   Net 1010 ml       GENERAL APPEARANCE: less work of breathing today  EYES: conjunctiva clear, eyes grossly normal  RESP: decreased with some rhonchi and wheezre  CV: irreg  ABDOMEN: soft, nontender, no HSM or masses and bowel sounds normal  MS: no clubbing, cyanosis; no edema  SKIN: clear without significant rashes or lesions  NEURO: confused, no focal lesion    Lab:  Recent Labs   Lab Test 12/19/18  0618 12/18/18  0532    141   POTASSIUM 4.1 4.6    CHLORIDE 106 105   CO2 31 33*   ANIONGAP 6 3   GLC 92 101*   BUN 19 18   CR 0.82 0.90   MARY JO 8.1* 8.1*     CBC RESULTS:   Recent Labs   Lab Test 12/19/18  0618 12/18/18  0532   WBC 7.0 9.8   RBC 4.00* 4.38*   HGB 12.4* 13.7   HCT 38.5* 42.3    193       Results for orders placed or performed during the hospital encounter of 12/16/18 (from the past 24 hour(s))   CBC with platelets   Result Value Ref Range    WBC 7.0 4.0 - 11.0 10e9/L    RBC Count 4.00 (L) 4.4 - 5.9 10e12/L    Hemoglobin 12.4 (L) 13.3 - 17.7 g/dL    Hematocrit 38.5 (L) 40.0 - 53.0 %    MCV 96 78 - 100 fl    MCH 31.0 26.5 - 33.0 pg    MCHC 32.2 31.5 - 36.5 g/dL    RDW 13.4 10.0 - 15.0 %    Platelet Count 187 150 - 450 10e9/L   Basic metabolic panel   Result Value Ref Range    Sodium 143 133 - 144 mmol/L    Potassium 4.1 3.4 - 5.3 mmol/L    Chloride 106 94 - 109 mmol/L    Carbon Dioxide 31 20 - 32 mmol/L    Anion Gap 6 3 - 14 mmol/L    Glucose 92 70 - 99 mg/dL    Urea Nitrogen 19 7 - 30 mg/dL    Creatinine 0.82 0.66 - 1.25 mg/dL    GFR Estimate 86 >60 mL/min/[1.73_m2]    GFR Estimate If Black >90 >60 mL/min/[1.73_m2]    Calcium 8.1 (L) 8.5 - 10.1 mg/dL   Blood gas venous   Result Value Ref Range    Ph Venous 7.37 7.32 - 7.43 pH    PCO2 Venous 58 (H) 40 - 50 mm Hg    PO2 Venous 37 25 - 47 mm Hg    Bicarbonate Venous 33 (H) 21 - 28 mmol/L    Base Excess Venous 6.3 mmol/L    FIO2 28%        Assessment and Plan:    Chadwick Aguilar is a 73 year old male with a past medical history significant for peripheral vascular disease s/p iliac and femoral stents, arteriosclerotic vascular disease, oxygen dependent COPD, dyslipidemia, hypertension, dementia, and tobacco abuse who presents on 12/16/2018 with shortness of breath found to have COPD exacerbation and new atrial fibrillation.      COPD exacerbation secondary to RSV  Chronic obstructive pulmonary disease with exacerbation  Chronic hypoxic resp failure  Oxygen dependent on 2L at baseline, mostly  uses at night. Managed prior to admission on Advair 250, Spiriva, and prn albuterol. Presents with increased wheezing, no hypoxia at baseline O2 needs. Afebrile, no leukocytosis. Chest x-ray showing bilateral hyperinflation and aortic calcifications. Initial VBG respiratory acidosis - pH 7.25 / pCO2 69 / pO2 17 / bicarb 30. Presentation most consistent with COPD exacerbation, although new atrial fibrillation (see below) may also be contributing to respiratory status. Patient was given DuoNebs, Solumedrol, ceftriaxone, and azithromycin in the emergency department.    On pred 40, oral levaquin, nebs, holding spiriva while on scheduled duoneb.  Ph 7.37/56, on two liters, desats with exertion to 80's and to 70's when pulls oxygen off     Paroxysmal atrial fibrillation - new diagnosis  Admit EKG demonstrating atrial fibrillation with ectopic beats.  Is on antiplatelets but not anticoagulation prior to admission. Taking metoprolol  mg daily prior to admission. CHADs-VASc = 4.  Xarelto - started, plavix stopped and asa continued (I did discuss this with [facility]).  Echo with normal EF and mild to moderate MR    Low level CAD    PVD (peripheral vascular disease)  Hyperlipidemia LDL goal <70  History of right iliac and femoral stents in 2016. History of coronary angiogram in 2016 but PCI not needed. Lexiscan in 2015 showing 10% stenosis of LAD, 40% stenosis of RCA - medically managed, follows with Dr. Sheron Berry. Managed prior to admission with aspirin 81 daily, Plavix 75 mg daily, metoprolol  mg daily, Zocor 10 mg daily, lisinopril 40 mg daily, and prn nitroglycerine.  - Continue prior to admission aspirin  Xarelto started and plavix stopped (discussed 12/18/18 with [facility]),asa continued.     Essential hypertension, benign  Metoprolol and Lisinopril continued.  bp running higher probably due to steroids-prn labetalol ordered.       Dementia without behavioral disturbance, unspecified dementia type/some acute  toxic encephalopathy due to illness with acute toxic and metabolic encephalopathy  Taking namenda 10 mg bid prior to admission, continue.  Continued problems with delerium overnight-but apparently slept after prn seroquel--will add scheduled seroquel 12.5 mg at night. Rozerem did not help insomnia-so I discontinued it.      Tobacco abuse  Continues to smoke occasionally, encourage cessation. Declines nicotine patch.        Fluids: lock  Electrolytes: Monitor  Nutrition: advance as tolerated     DVT Prophylaxis: Pneumatic Compression Devices  Code Status: DNR / DNI - discussed directly with patient and his wife     Lines: Peripheral  Fry catheter: Not indicated     Plan- His wife is anxious for discharge. She wants to take him home (not tcu)-- but he drops sats into 70's when he pulls oxygen off, not safe for discharge now. Would hope to discharge in 1-2 days. Unable to keep iv in, on oral levaquin. Resp virus panel shows RSV.

## 2018-12-20 ENCOUNTER — APPOINTMENT (OUTPATIENT)
Dept: PHYSICAL THERAPY | Facility: CLINIC | Age: 73
DRG: 190 | End: 2018-12-20
Payer: COMMERCIAL

## 2018-12-20 VITALS
SYSTOLIC BLOOD PRESSURE: 168 MMHG | DIASTOLIC BLOOD PRESSURE: 73 MMHG | BODY MASS INDEX: 24.8 KG/M2 | HEIGHT: 66 IN | HEART RATE: 75 BPM | TEMPERATURE: 97.6 F | WEIGHT: 154.32 LBS | RESPIRATION RATE: 18 BRPM | OXYGEN SATURATION: 94 %

## 2018-12-20 PROBLEM — J20.5 ACUTE BRONCHITIS DUE TO RESPIRATORY SYNCYTIAL VIRUS (RSV): Status: ACTIVE | Noted: 2018-12-20

## 2018-12-20 LAB
ANION GAP SERPL CALCULATED.3IONS-SCNC: 4 MMOL/L (ref 3–14)
BUN SERPL-MCNC: 18 MG/DL (ref 7–30)
CALCIUM SERPL-MCNC: 8.2 MG/DL (ref 8.5–10.1)
CHLORIDE SERPL-SCNC: 105 MMOL/L (ref 94–109)
CO2 SERPL-SCNC: 33 MMOL/L (ref 20–32)
CREAT SERPL-MCNC: 0.9 MG/DL (ref 0.66–1.25)
ERYTHROCYTE [DISTWIDTH] IN BLOOD BY AUTOMATED COUNT: 13.2 % (ref 10–15)
GFR SERPL CREATININE-BSD FRML MDRD: 83 ML/MIN/{1.73_M2}
GLUCOSE SERPL-MCNC: 96 MG/DL (ref 70–99)
HCT VFR BLD AUTO: 39.3 % (ref 40–53)
HGB BLD-MCNC: 12.5 G/DL (ref 13.3–17.7)
MCH RBC QN AUTO: 30.6 PG (ref 26.5–33)
MCHC RBC AUTO-ENTMCNC: 31.8 G/DL (ref 31.5–36.5)
MCV RBC AUTO: 96 FL (ref 78–100)
PLATELET # BLD AUTO: 193 10E9/L (ref 150–450)
POTASSIUM SERPL-SCNC: 4.6 MMOL/L (ref 3.4–5.3)
RBC # BLD AUTO: 4.08 10E12/L (ref 4.4–5.9)
SODIUM SERPL-SCNC: 142 MMOL/L (ref 133–144)
WBC # BLD AUTO: 7.2 10E9/L (ref 4–11)

## 2018-12-20 PROCEDURE — 80048 BASIC METABOLIC PNL TOTAL CA: CPT | Performed by: FAMILY MEDICINE

## 2018-12-20 PROCEDURE — 25000125 ZZHC RX 250: Performed by: PHYSICIAN ASSISTANT

## 2018-12-20 PROCEDURE — 97116 GAIT TRAINING THERAPY: CPT | Mod: GP | Performed by: PHYSICAL THERAPIST

## 2018-12-20 PROCEDURE — 40000193 ZZH STATISTIC PT WARD VISIT: Performed by: PHYSICAL THERAPIST

## 2018-12-20 PROCEDURE — 25000132 ZZH RX MED GY IP 250 OP 250 PS 637: Performed by: PHYSICIAN ASSISTANT

## 2018-12-20 PROCEDURE — 36415 COLL VENOUS BLD VENIPUNCTURE: CPT | Performed by: FAMILY MEDICINE

## 2018-12-20 PROCEDURE — 97161 PT EVAL LOW COMPLEX 20 MIN: CPT | Mod: GP | Performed by: PHYSICAL THERAPIST

## 2018-12-20 PROCEDURE — 85027 COMPLETE CBC AUTOMATED: CPT | Performed by: FAMILY MEDICINE

## 2018-12-20 PROCEDURE — 99239 HOSP IP/OBS DSCHRG MGMT >30: CPT

## 2018-12-20 PROCEDURE — 94640 AIRWAY INHALATION TREATMENT: CPT

## 2018-12-20 RX ORDER — METOPROLOL SUCCINATE 100 MG/1
100 TABLET, EXTENDED RELEASE ORAL DAILY
Qty: 60 TABLET | Refills: 0 | Status: SHIPPED | OUTPATIENT
Start: 2018-12-20 | End: 2020-01-01

## 2018-12-20 RX ORDER — ALBUTEROL SULFATE 0.83 MG/ML
2.5 SOLUTION RESPIRATORY (INHALATION) EVERY 6 HOURS PRN
Qty: 120 VIAL | Refills: 3 | Status: SHIPPED | OUTPATIENT
Start: 2018-12-20 | End: 2020-01-01

## 2018-12-20 RX ORDER — PREDNISONE 20 MG/1
40 TABLET ORAL DAILY
Qty: 8 TABLET | Refills: 0 | Status: SHIPPED | OUTPATIENT
Start: 2018-12-21 | End: 2018-12-28

## 2018-12-20 RX ADMIN — FLUTICASONE FUROATE AND VILANTEROL TRIFENATATE 1 PUFF: 100; 25 POWDER RESPIRATORY (INHALATION) at 08:13

## 2018-12-20 RX ADMIN — IPRATROPIUM BROMIDE AND ALBUTEROL SULFATE 3 ML: .5; 3 SOLUTION RESPIRATORY (INHALATION) at 06:39

## 2018-12-20 RX ADMIN — PREDNISONE 40 MG: 20 TABLET ORAL at 08:14

## 2018-12-20 RX ADMIN — MEMANTINE HYDROCHLORIDE 10 MG: 10 TABLET, FILM COATED ORAL at 08:14

## 2018-12-20 ASSESSMENT — ACTIVITIES OF DAILY LIVING (ADL)
ADLS_ACUITY_SCORE: 15

## 2018-12-20 ASSESSMENT — MIFFLIN-ST. JEOR: SCORE: 1377.75

## 2018-12-20 NOTE — PROGRESS NOTES
Chadwick Aguilar    PT Initial Evaluation  12/20/18 0900   Quick Adds   Type of Visit Initial PT Evaluation   Living Environment   Lives With spouse   Living Arrangements house  (basement of a house)   Home Accessibility (4 stairs to get in, railings on one side)   Self-Care   Usual Activity Tolerance good   Current Activity Tolerance moderate   Equipment Currently Used at Home none  (owns shower chair, handheld shower, owns a cane)   Activity/Exercise/Self-Care Comment using oxygen for 2 years now   Functional Level Prior   Ambulation 0-->independent   Transferring 0-->independent   Toileting 0-->independent   Bathing 0-->independent   Fall history within last six months no   Which of the above functional risks had a recent onset or change? none   General Information   Onset of Illness/Injury or Date of Surgery - Date 12/16/18   Referring Physician Noah Frey MD   Patient/Family Goals Statement get back home, get stronger   Pertinent History of Current Problem (include personal factors and/or comorbidities that impact the POC) per H&P: Chadwick Aguilar is a 73 year old male with a past medical history significant for peripheral vascular disease s/p iliac and femoral stents, arteriosclerotic vascular disease, oxygen dependent COPD, dyslipidemia, hypertension, dementia, and tobacco abuse who presents on 12/16/2018 with shortness of breath found to have COPD exacerbation and new atrial fibrillation.   Precautions/Limitations oxygen therapy device and L/min  (2L of Oxygen)   Cognitive Status Examination   Orientation orientation to person, place and time   Level of Consciousness alert   Follows Commands and Answers Questions 100% of the time   Personal Safety and Judgment intact   Memory intact   Pain Assessment   Patient Currently in Pain No   Integumentary/Edema   Integumentary/Edema no deficits were identifed   Posture    Posture Kyphosis;Forward head position   Range of Motion (ROM)   ROM Quick Adds  No deficits were identified   Strength   Manual Muscle Testing Quick Adds No deficits were identified   Bed Mobility   Bed Mobility Bed mobility skill: Sit to supine;Bed mobility skill: Supine to sit   Bed Mobility Comments Pt is SBA with all bed mobility without cues   Bed Mobility Skill: Sit to Supine   Level of [place]: Sit/Supine stand-by assist   Physical Assist/Nonphysical Assist: Sit/Supine set-up required;supervision   Bed Mobility Skill: Supine to Sit   Level of [place]: Supine/Sit stand-by assist   Physical Assist/Nonphysical Assist: Supine/Sit set-up required;supervision   Transfer Skills   Transfer Transfer Skill: Sit to Stand;Transfer Skill:  Stand to Sit   Transfer Comments Pt is SBA with tranfers without AD, with management of oxygen and verbal cues for safety   Transfer Skill:  Sit to Stand   Level of [place]: Sit/Stand stand-by assist   Physical Assist/Nonphysical Assist: Sit/Stand set-up required;supervision;verbal cues   Weightbearing Restrictions: Sit/Stand full weight-bearing   Assistive Device for Transfer: Sit/Stand other (see comments)  (none)   Transfer Skill: Stand to Sit   Level of [place]: Stand/Sit stand-by assist   Physical Assist/Nonphysical Assist: Stand/Sit set-up required;supervision;verbal cues   Weight-Bearing Restrictions: Stand/Sit full weight-bearing   Assistive Device: Stand to Sit other (see comments)  (none)   Gait   Gait Gait Skill   Gait Comments Pt amb 150 feet without AD with managment of oxygen by PT, verbal cues for safety throughout   Gait Skills   Level of [place]: Gait stand-by assist   Physical Assist/Nonphysical Assist: Gait set-up required;supervision;verbal cues   Weight-Bearing Restrictions: Gait full weight-bearing   Assistive Device for Transfer: Gait other (see comments)  (grabs onto walls at times)   Gait Distance 150 feet   Balance   Balance no deficits were identified   Sensory Examination   Sensory Perception no deficits were  "identified   Coordination   Coordination no deficits were identified   Muscle Tone   Muscle Tone no deficits were identified   General Therapy Interventions   Planned Therapy Interventions strengthening;transfer training;gait training;neuromuscular re-education   Intervention Comments to address impairments above, improve overall strength and function   Clinical Impression   Criteria for Skilled Therapeutic Intervention yes, treatment indicated   PT Diagnosis generalized weakness   Influenced by the following impairments weakness, SOB   Functional limitations due to impairments decreased functional capacity, decreased endurance   Clinical Presentation Stable/Uncomplicated   Clinical Presentation Rationale PT judgement, subjective report, objective data   Clinical Decision Making (Complexity) Low complexity   Therapy Frequency` daily   Predicted Duration of Therapy Intervention (days/wks) 3 days   Anticipated Discharge Disposition Home   Risk & Benefits of therapy have been explained Yes   Patient, Family & other staff in agreement with plan of care Yes   Clinical Impression Comments Pt is a pleasant 74 y/o male presenting to PT with generalized weakness. He will benefit from skilled PT to address problems list above and improve overall function. Pt is a fair PT candidate.   [facility] [employer] \"6 Clicks\"   2016, Trustees of [facility], under license to [employer].  All rights reserved.   6 Clicks Short Forms Basic Mobility Inpatient Short Form   [facility]  \"6 Clicks\" V.2 Basic Mobility Inpatient Short Form   1. Turning from your back to your side while in a flat bed without using bedrails? 4 - None   2. Moving from lying on your back to sitting on the side of a flat bed without using bedrails? 4 - None   3. Moving to and from a bed to a chair (including a wheelchair)? 4 - None   4. Standing up from a chair using your arms (e.g., wheelchair, or bedside chair)? 4 - None   5. To walk in " hospital room? 3 - A Little   6. Climbing 3-5 steps with a railing? 3 - A Little   Basic Mobility Raw Score (Score out of 24.Lower scores equate to lower levels of function) 22   Total Evaluation Time   Total Evaluation Time (Minutes) 8       Please Contact me with any questions or concerns. Thank you for for patience and cooperation.     Rodríguez Patel PT, DPT  Flexible Workforce Physical Therapist   [facility]  [email or web address]

## 2018-12-20 NOTE — PROGRESS NOTES
[facility] TRANSPORT NOTE  Data:   Reason for Transport:  Med surg room avail    Chadwick Aguilar was transported to 2401 via wheel chair at 0630.  Patient was accompanied by Registered Nurse. Equipment used for transport: Oxygen  Nasal Cannula. Family was aware of reason for transport: no    Action:  Report: given to Mary Schirmer RN    Response:  Patient's condition when transferred off unit was stable.    Willa Stoddard

## 2018-12-20 NOTE — PLAN OF CARE
Discharge Planner PT   Patient plan for discharge: home  Current status: SBA with bed mobility and tranfers, amb 150 feet without assistive device SBA with management of oxygen by PT (on 2L)  Barriers to return to prior living situation: decreased functional endurance  Recommendations for discharge: home  Rationale for recommendations: good with transfers and gait, PT judgement.       Entered by: Rodríguez Patel 12/20/2018 10:16 AM     Please Contact me with any questions or concerns. Thank you for for patience and cooperation.     Rodríguez Patel PT, DPT  Flexible Workforce Physical Therapist   [facility]  [email or web address]

## 2018-12-20 NOTE — PROGRESS NOTES
[facility] DISCHARGE NOTE    Patient discharged to home at 11:04 AM via wheel chair. Accompanied by spouse and staff. Discharge instructions reviewed with patient and spouse, opportunity offered to ask questions. Prescriptions sent to patients preferred pharmacy. All belongings sent with patient.    Angel Maynard

## 2018-12-20 NOTE — PLAN OF CARE
Received report from NAZIA HART.    Patient transferred up to floor from ICU via WHEELCHAIR. With belongings.

## 2018-12-20 NOTE — DISCHARGE SUMMARY
Physical Therapy Discharge Summary    Reason for therapy discharge:    Discharged to home.    Progress towards therapy goal(s). See goals on Care Plan in [facility] electronic health record for goal details.  Goals partially met.  Barriers to achieving goals:   discharge from facility.    Therapy recommendation(s):    Continue home exercise program.       Please Contact me with any questions or concerns. Thank you for for patience and cooperation.     Rodríguez Patel PT, DPT  Flexible Workforce Physical Therapist   [facility]  [email or web address]

## 2018-12-20 NOTE — PHARMACY - DISCHARGE MEDICATION RECONCILIATION
Discharge medication review for this patient is complete. Pharmacist assisted with medication reconciliation of discharge medications with prior to admission medications.     The following changes were made to the discharge medication list based on pharmacist review:  Added:  xarelto and prednisone  Discontinued: metoprolol  Changed: NA      Patient's Discharge Medication List  - medications as listed on After Visit Summary (AVS)     Review of your medicines      START taking      Dose / Directions   predniSONE 20 MG tablet  Commonly known as:  DELTASONE      Dose:  40 mg  Start taking on:  12/21/2018  Take 40 mg by mouth daily for 7 days.  Quantity:  8 tablet  Refills:  0     rivaroxaban ANTICOAGULANT 20 MG Tabs tablet  Commonly known as:  XARELTO      Dose:  20 mg  Take 1 tablet (20 mg) by mouth daily (with dinner)  Quantity:  30 tablet  Refills:  0        CONTINUE these medicines which may have CHANGED, or have new prescriptions. If we are uncertain of the size of tablets/capsules you have at home, strength may be listed as something that might have changed.      Dose / Directions   aspirin 81 MG chewable tablet  Commonly known as:  ASA  This may have changed:  when to take this  Used for:  Abnormal nuclear cardiac imaging test      Dose:  81 mg  Take 1 tablet (81 mg) by mouth daily  Quantity:  108 tablet  Refills:  3     citalopram 20 MG tablet  Commonly known as:  celeXA  This may have changed:  when to take this      Dose:  20 mg  Take 1 tablet (20 mg) by mouth daily  Quantity:  90 tablet  Refills:  3     lisinopril 40 MG tablet  Commonly known as:  PRINIVIL/ZESTRIL  This may have changed:  when to take this      Dose:  40 mg  Take 1 tablet (40 mg) by mouth daily  Quantity:  90 tablet  Refills:  3     metoprolol succinate  MG 24 hr tablet  Commonly known as:  TOPROL-XL  This may have changed:  when to take this      Dose:  100 mg  Take 1 tablet (100 mg) by mouth daily  Quantity:  60 tablet  Refills:  0         CONTINUE these medicines which have NOT CHANGED      Dose / Directions   albuterol (2.5 MG/3ML) 0.083% neb solution  Commonly known as:  PROVENTIL      Dose:  1 vial  Take 1 vial (2.5 mg) by nebulization every 6 hours as needed for shortness of breath / dyspnea or wheezing  Quantity:  25 vial  Refills:  11     fluticasone-salmeterol 250-50 MCG/DOSE inhaler  Commonly known as:  ADVAIR DISKUS      Dose:  1 puff  Inhale 1 puff into the lungs 2 times daily  Quantity:  60 Inhaler  Refills:  11     memantine 10 MG tablet  Commonly known as:  NAMENDA      Dose:  10 mg  Take 1 tablet (10 mg) by mouth 2 times daily  Quantity:  180 tablet  Refills:  3     MULTIVITAMIN ADULT PO      Dose:  1 tablet  Take 1 tablet by mouth every evening  Refills:  0     nitroGLYcerin 0.4 MG sublingual tablet  Commonly known as:  NITROSTAT  Used for:  Abnormal nuclear cardiac imaging test      For chest pain place 1 tablet under the tongue every 5 minutes for 3 doses. If symptoms persist 5 minutes after 1st dose call 911.  Quantity:  25 tablet  Refills:  0     * order for DME  Used for:  Exertional dyspnea      Equipment being ordered: shower chair  Quantity:  1 each  Refills:  0     * order for DME      Equipment being ordered: Portable oxygen concentrator with O2 @ 2L NC PRN shortnes of breath.  Quantity:  1 each  Refills:  0     simvastatin 10 MG tablet  Commonly known as:  ZOCOR      Dose:  10 mg  Take 1 tablet (10 mg) by mouth At Bedtime  Quantity:  93 tablet  Refills:  3     tiotropium 18 MCG inhaled capsule  Commonly known as:  SPIRIVA HANDIHALER      Dose:  18 mcg  Inhale 1 capsule (18 mcg) into the lungs daily Inhale contents of one capsule daily.  Quantity:  90 capsule  Refills:  3     VITAMIN B-1 PO      Dose:  250 mg  Take 250 mg by mouth 2 times daily  Refills:  0         * This list has 2 medication(s) that are the same as other medications prescribed for you. Read the directions carefully, and ask your doctor or other care  provider to review them with you.               Where to get your medicines      These medications [place] [facility] [place] - [address]  [address] [facility] [address]    Phone:  [phone]     metoprolol succinate  MG 24 hr tablet    predniSONE 20 MG tablet    rivaroxaban ANTICOAGULANT 20 MG Tabs tablet

## 2018-12-20 NOTE — PROGRESS NOTES
Care Coordinator - Discharge Planning    Admission Date/Time:  12/16/2018  Attending MD:  Noah Frey MD     Data  Date of initial CC assessment:  12/17/2018  Chart reviewed, discussed with interdisciplinary team.   Patient was admitted for:   1. Chronic obstructive pulmonary disease, unspecified COPD type (H)    2. COPD exacerbation (H)    3. Atrial fibrillation, unspecified type (H)    4. Acute and chronic respiratory failure with hypoxia (H)    5. Personal history of tobacco use, presenting hazards to health    6. Long term (current) use of anticoagulants    7. Paroxysmal atrial fibrillation (H)         Assessment   Full assessment completed in previous note; met with patient and wife again today to discuss plan. Patient did well with PT and both patient and wife want patient to return home.     Coordination of Care and Referrals: Provided patient/family with options for patient and spouse denied any needs at home.      Plan  Anticipated Discharge Date:  12/20/2018  Anticipated Discharge Plan:  Home    CTS Handoff completed:  YES    Rita Mcclelland, MSN, RN, Care Coordinator  [facility] [phone]  [facility] [phone]

## 2018-12-20 NOTE — DISCHARGE SUMMARY
[facility]    Discharge Summary  Hospital Medicine    Date of Admission:  12/16/2018  Date of Discharge:  12/20/2018   Discharging Provider: Noah Frey  Date of Service: 12/20/2018      Primary Care     Jessi Hills  [address]  [facility] [address]      Identification and Chief Compaint:  Chadwick Aguilar is a 73 year old male with a past medical history significant for peripheral vascular disease s/p iliac and femoral stents, arteriosclerotic vascular disease, oxygen dependent COPD, dyslipidemia, hypertension, dementia, and tobacco abuse who presents on 12/16/2018 with shortness of breath found to have COPD exacerbation and new atrial fibrillation.        Discharge Diagnoses       COPD exacerbation (H)    Acute bronchitis due to respiratory syncytial virus (RSV)    PVD (peripheral vascular disease) (H)    Chronic obstructive pulmonary disease, unspecified COPD type (H)    Dementia without behavioral disturbance, unspecified dementia type    Arteriosclerotic vascular disease    Essential hypertension, benign    Hyperlipidemia LDL goal <70    Paroxysmal atrial fibrillation (H)    Tobacco abuse    Discharge Disposition   Discharged to home    Discharge Orders      Reason for your hospital stay    You came down with a lung infection due to a virus called Respiratory Syncytial Virus (RSV), which threw you into a COPD exacerbation.  You're steadily improving, to the point at which you can be discharged home today to continue your recovery there.     Follow-up and recommended labs and tests     Follow up with primary care provider, Jessi Hills, within 7 days for hospital follow- up.  No follow up labs or test are needed.     Activity    Your activity upon discharge: activity as tolerated.     DNR/DNI     Oxygen Adult    Renew Home Oxygen Order  Renew previous prescription.  Expected treatment length is indefinite (99 months).    Attending Provider:  Noah Frey  Physician signature: See electronic signature associated with these discharge orders  Date of Order: December 20, 2018     Diet    Follow this diet upon discharge: Orders Placed This Encounter      Snacks/Supplements Adult: Boost Shake; Between Meals      Regular Diet Adult         Further instructions from your care team       As a system Vonda wants to ensure that across the care continuum that you have support through care coordination services that include nurses and social workers in the outpatient setting.  Due to your COPD I feel it is important you have this support when you discharge.  They will be calling you within 24-48 hours of your discharge.   A brochure describing the services was provided.  If you have questions you can reach out to your clinic directly and ask for the Care Coordinator assigned to your care      Senior Linkage Line  Call the experts at [facility] Linkage Line, a free one stop shop for [place] Seniors  Free, objective and comprehensive counseling provided by trained professionals  Long term care options counselors available Monday-Friday 8:00am-4:30pm    Call [phone]  [email or web address]                     Discharge Medications   Current Discharge Medication List      START taking these medications    Details   predniSONE (DELTASONE) 20 MG tablet Take 40 mg by mouth daily for 7 days.  Qty: 8 tablet, Refills: 0    Associated Diagnoses: Chronic obstructive pulmonary disease, unspecified COPD type (H)      rivaroxaban ANTICOAGULANT (XARELTO) 20 MG TABS tablet Take 1 tablet (20 mg) by mouth daily (with dinner)  Qty: 30 tablet, Refills: 0    Associated Diagnoses: Paroxysmal atrial fibrillation (H)         CONTINUE these medications which have NOT CHANGED    Details   albuterol (2.5 MG/3ML) 0.083% neb solution Take 1 vial (2.5 mg) by nebulization every 6 hours as needed for shortness of breath / dyspnea or wheezing  Qty: 25 vial, Refills: 11    Associated  Diagnoses: Chronic obstructive pulmonary disease, unspecified COPD type (H)      aspirin 81 MG chewable tablet Take 1 tablet (81 mg) by mouth daily  Qty: 108 tablet, Refills: 3    Associated Diagnoses: Abnormal nuclear cardiac imaging test      citalopram (CELEXA) 20 MG tablet Take 1 tablet (20 mg) by mouth daily  Qty: 90 tablet, Refills: 3    Associated Diagnoses: Dementia without behavioral disturbance, unspecified dementia type      fluticasone-salmeterol (ADVAIR DISKUS) 250-50 MCG/DOSE diskus inhaler Inhale 1 puff into the lungs 2 times daily  Qty: 60 Inhaler, Refills: 11    Associated Diagnoses: Chronic obstructive pulmonary disease, unspecified COPD type (H)      lisinopril (PRINIVIL/ZESTRIL) 40 MG tablet Take 1 tablet (40 mg) by mouth daily  Qty: 90 tablet, Refills: 3    Associated Diagnoses: Essential hypertension, benign      memantine (NAMENDA) 10 MG tablet Take 1 tablet (10 mg) by mouth 2 times daily  Qty: 180 tablet, Refills: 3    Associated Diagnoses: Dementia without behavioral disturbance, unspecified dementia type      Multiple Vitamins-Minerals (MULTIVITAMIN ADULT PO) Take 1 tablet by mouth every evening       !! order for DME Equipment being ordered: Portable oxygen concentrator with O2 @ 2L NC PRN shortnes of breath.  Qty: 1 each, Refills: 0    Associated Diagnoses: Chronic obstructive pulmonary disease, unspecified COPD type (H)      simvastatin (ZOCOR) 10 MG tablet Take 1 tablet (10 mg) by mouth At Bedtime  Qty: 93 tablet, Refills: 3    Associated Diagnoses: Arteriosclerotic vascular disease      Thiamine HCl (VITAMIN B-1 PO) Take 250 mg by mouth 2 times daily       tiotropium (SPIRIVA HANDIHALER) 18 MCG capsule Inhale 1 capsule (18 mcg) into the lungs daily Inhale contents of one capsule daily.  Qty: 90 capsule, Refills: 3    Associated Diagnoses: Chronic obstructive pulmonary disease, unspecified COPD type (H)      nitroGLYcerin (NITROSTAT) 0.4 MG sublingual tablet For chest pain place 1  tablet under the tongue every 5 minutes for 3 doses. If symptoms persist 5 minutes after 1st dose call 911.  Qty: 25 tablet, Refills: 0    Associated Diagnoses: Abnormal nuclear cardiac imaging test      !! order for DME Equipment being ordered: shower chair  Qty: 1 each, Refills: 0    Associated Diagnoses: Chronic obstructive pulmonary disease, unspecified COPD type (H); Exertional dyspnea       !! - Potential duplicate medications found. Please discuss with provider.      STOP taking these medications       metoprolol succinate (TOPROL-XL) 100 MG 24 hr tablet Comments:   Reason for Stopping:             Allergies   No Known Allergies    Consultations This Hospital Stay   Consultation during this admission received from:    NUTRITION SERVICES ADULT IP CONSULT  CARE TRANSITION RN/SW IP CONSULT  PHYSICAL THERAPY ADULT IP CONSULT    Significant Results and Procedures   Procedures    None.    Data   Results for orders placed or performed during the hospital encounter of 12/16/18   Chest XR,  PA & LAT    Narrative    CHEST TWO VIEWS  12/16/2018 7:33 PM     HISTORY:  Shortness of breath.    COMPARISON: None.      Impression    IMPRESSION: Hyperinflation both lungs. The lungs are otherwise clear.  No pleural effusions are identified. Heart size and pulmonary  vascularity are within normal limits. No pneumothorax. Aortic  calcification.    GRACIE NAVA MD   XR Chest 2 Views    Narrative    XR CHEST 2 VW 12/17/2018 5:59 AM    COMPARISON: 12/16/2018    HISTORY: Shortness of breath.      Impression    IMPRESSION: Apically predominant emphysema again seen. No focal  airspace disease identified on either side. No pleural effusion or  pneumothorax.    PENNY RODRIGUES MD       History of Present Illness   (From H&P) Chadwick Aguilar is a 73 year old male with the above past medical history now presents on 12/16/2018 with shortness of breath and decreased appetite.      He was in his usual state of health until 2 days prior to  "admission when he developed significantly worsening shortness of breath. He has had a cold for the past few days as well and has nasal congestion. No known fevers or chills at home. Yesterday his shortness of breath was significantly worse and he sat around at home. He was unable to ambulate around his home even short distances. He attempted albuterol nebs at home without improvement. He is on 2L O2 at home, uses it more at night than during the day. He has a cough that sounds productive but he is unable to cough up phlegm. His wife called EMS due to his dyspnea on exertion.     He denies orthopnea, PND, edema, or known weight changes. No known history of CHF.     His wife states that patient has had \"irregular heart beats\" in the past, but there is no apparent formal diagnosis of arrhythmia mentioned in his chart. His admit EKG was showing atrial fibrillation with ectopic beats. Discussed the recommendation for anticoagulation due to his CHADs VASc score of 4 - patient has transportation issues and would like to avoid coumadin and INR checks if possible. Pharmacy is looking into cost for DOACs, would ideally start Xarelto.     On review of systems patient denies fever or chills, but a cold virus is going through his household currently. He lost his appetite about 2 days ago and has not had much to eat or drink in the past 2 days. He denies chest pain or palpitations. The remainder review of systems is negative.             Hospital Course   Chadwick Aguilar was admitted on 12/16/2018.  The following problems were addressed during his hospitalization:    COPD exacerbation secondary to RSV bronchitis  Chronic obstructive pulmonary disease with exacerbation  Chronic hypoxic resp failure  Oxygen dependent on 2L at baseline, mostly uses at night. Managed prior to admission on Advair 250/50, Spiriva, and albuterol nebulized PRN. Presented with increased wheezing, no hypoxia beyond baseline O2 needs. Afebrile, no " leukocytosis. Chest x-ray showed bilateral hyperinflation and aortic calcifications. Initial VBG respiratory acidosis - pH 7.25 / pCO2 69 / pO2 17 / bicarb 30. Presentation most consistent with COPD exacerbation, although new atrial fibrillation (see below) may have also contributed to respiratory status. Patient was given DuoNebs, Solumedrol, ceftriaxone, and azithromycin in the emergency department.  Respiratory status gradually improved on Rx.  CTAX and azithro were stopped, levofloxacin given for two days then stopped when RSV result returned positive.  Steroids changed to Prednisone 40 mg PO every day.  As of this morning, patient reports more dyspnea than baseline, but improved from admission.  Was able to ambulate in halls on O2, though with some difficulty.  Mild cough, scant sputum.  Patient feels well enough to go home, his wife at bedside agrees.  - Discharge home today.  - Continue preadmission O2 flow of 2 L/min.  Has his portable concentrator here for transport home.  - Continue preadmission inhaled Rx of Advair, Spiriva, and nebulized albuterol.  - Prednisone 40 mg every day for four additional days, then stop.     Paroxysmal atrial fibrillation - new diagnosis  Admit EKG demonstrated atrial fibrillation with ectopic beats.  Echo showed normal EF and mild to moderate MR.  Prior to admission was on dual antiplatelet therapy for peripheral vascular disease with clopidogrel and ASA.  Was also taking metoprolol  mg daily prior to admission for hypertension.  CHADs-VASc 4.  Risks/benefits of systemic anticoagulation discussed; patient wished to proceed.  - Rivaroxaban 20 mg every day started.  - Clopidogrel discontinued, ASA continued (Dr. Mckeon discussed this with [facility]).       Low level CAD    PVD (peripheral vascular disease)  Hyperlipidemia LDL goal <70  History of right iliac and femoral stents in 2016. History of coronary angiogram in 2016 but PCI not needed. Lexiscan in 2015 showing  10% stenosis of LAD, 40% stenosis of RCA - medically managed, follows with Dr. Sheron Berry. Managed prior to admission with aspirin 81 daily, clopidogrel 75 mg daily, metoprolol  mg daily, simvastatin 10 mg daily, lisinopril 40 mg daily, and NTG PRN.  - Continue prior to admission ASA.  - Rivaroxaban started, clopidogrel stopped, as Rx for a-fib (discussed 12/18/18 with [facility]).  - Continue metoprolol, simvastatin, lisinopril.     Essential hypertension, benign  Metoprolol and lisinopril continued on admission.  BP running mildly high here in-house, but is on Prednisone.  - Continue lisinopril and metoprolol unchanged, follow-up on BP as outpatient.      Dementia without behavioral disturbance, unspecified dementia type/some acute toxic encephalopathy due to illness with acute toxic and metabolic encephalopathy  Taking namenda 10 mg bid prior to admission.  Had problems with nocturnal delirium early in the hospital stay, which responded well to quetiapine at HS.  Will not discharge on quetiapine, as delirium is likely to improve once steroids are discontinued and once he is back in his own home.      Tobacco abuse  Continues to smoke occasionally, encourage cessation. Declines nicotine patch.        Pending Results   Unresulted Labs Ordered in the Past 30 Days of this Admission     No orders found from 10/17/2018 to 12/17/2018.          Physical Exam   Temp:  [97.5  F (36.4  C)-98.4  F (36.9  C)] 97.6  F (36.4  C)  Pulse:  [68-75] 75  Heart Rate:  [58-69] 60  Resp:  [16-18] 18  BP: (128-188)/(66-94) 168/73  SpO2:  [92 %-97 %] 94 %  Vitals:    12/18/18 0600 12/19/18 0615 12/20/18 0615   Weight: 68.3 kg (150 lb 9.2 oz) 68 kg (149 lb 14.6 oz) 70 kg (154 lb 5.2 oz)       GENERAL: Pleasant man, looks comfortable, lying in bed.  EYES: Eyes grossly normal to inspection, extraocular movements intact  HENT: Nares patent bilaterally.  Nasal mucosa normal, no discharge.    NECK: Trachea midline, no stridor.    RESP:  No accessory muscle use at rest.  Symmetrically distant breath sounds bilaterally.  Lungs quiet but clear throughout on inspiration.  Expiration very quiet and prolonged, no wheeze.  CV: Regular rate and rhythm, non-tachycardic.  Normal S1 S2, faintly heard holosystolic murmur along the left sternal border, no extra sound.  No lower extremity edema.  ABDOMEN: Soft, non-tender, no guarding.  Liver and spleen not enlarged, no masses palpable.  Bowel sounds positive.  MS: No bony deformities noted.  No red or inflamed joints.  SKIN: Warm and dry, no rashes, though multiple ecchymoses seen consistent with steroid use.  NEURO: Alert, oriented, conversant.  Cranial nerves III - XII grossly intact.  No gross motor or sensory deficits.    PSYCH: Calm, alert, conversant.  Able to articulate logical thoughts, no tangential thoughts, no hallucinations or delusions.  Affect normal.        The discharge plan was discussed with the patient and his wife, both of whom express agreement.    Total time on this discharge was 35 minutes.    Noah Frey MD

## 2018-12-21 ENCOUNTER — HOSPITAL ENCOUNTER (EMERGENCY)
Facility: CLINIC | Age: 73
Discharge: HOME | End: 2018-12-21
Attending: FAMILY MEDICINE | Admitting: FAMILY MEDICINE
Payer: COMMERCIAL

## 2018-12-21 ENCOUNTER — APPOINTMENT (OUTPATIENT)
Dept: GENERAL RADIOLOGY | Facility: CLINIC | Age: 73
End: 2018-12-21
Attending: FAMILY MEDICINE
Payer: COMMERCIAL

## 2018-12-21 ENCOUNTER — PATIENT OUTREACH (OUTPATIENT)
Dept: CARE COORDINATION | Facility: CLINIC | Age: 73
End: 2018-12-21

## 2018-12-21 VITALS
HEART RATE: 73 BPM | DIASTOLIC BLOOD PRESSURE: 107 MMHG | OXYGEN SATURATION: 92 % | SYSTOLIC BLOOD PRESSURE: 194 MMHG | TEMPERATURE: 98.3 F | BODY MASS INDEX: 24.91 KG/M2 | HEIGHT: 66 IN

## 2018-12-21 DIAGNOSIS — J44.0 CHRONIC OBSTRUCTIVE PULMONARY DISEASE WITH ACUTE LOWER RESPIRATORY INFECTION (H): ICD-10-CM

## 2018-12-21 DIAGNOSIS — F17.210 CIGARETTE SMOKER: ICD-10-CM

## 2018-12-21 LAB
ALBUMIN SERPL-MCNC: 3.3 G/DL (ref 3.4–5)
ALP SERPL-CCNC: 32 U/L (ref 40–150)
ALT SERPL W P-5'-P-CCNC: 33 U/L (ref 0–70)
ANION GAP SERPL CALCULATED.3IONS-SCNC: 7 MMOL/L (ref 3–14)
AST SERPL W P-5'-P-CCNC: 22 U/L (ref 0–45)
BASOPHILS # BLD AUTO: 0 10E9/L (ref 0–0.2)
BASOPHILS NFR BLD AUTO: 0.4 %
BILIRUB SERPL-MCNC: 0.3 MG/DL (ref 0.2–1.3)
BUN SERPL-MCNC: 19 MG/DL (ref 7–30)
CALCIUM SERPL-MCNC: 8.4 MG/DL (ref 8.5–10.1)
CHLORIDE SERPL-SCNC: 101 MMOL/L (ref 94–109)
CO2 SERPL-SCNC: 31 MMOL/L (ref 20–32)
CREAT SERPL-MCNC: 0.77 MG/DL (ref 0.66–1.25)
DIFFERENTIAL METHOD BLD: ABNORMAL
EOSINOPHIL # BLD AUTO: 0 10E9/L (ref 0–0.7)
EOSINOPHIL NFR BLD AUTO: 0.4 %
ERYTHROCYTE [DISTWIDTH] IN BLOOD BY AUTOMATED COUNT: 12.8 % (ref 10–15)
GFR SERPL CREATININE-BSD FRML MDRD: 88 ML/MIN/{1.73_M2}
GLUCOSE SERPL-MCNC: 97 MG/DL (ref 70–99)
HCT VFR BLD AUTO: 40.5 % (ref 40–53)
HGB BLD-MCNC: 13.2 G/DL (ref 13.3–17.7)
IMM GRANULOCYTES # BLD: 0.2 10E9/L (ref 0–0.4)
IMM GRANULOCYTES NFR BLD: 1.8 %
LYMPHOCYTES # BLD AUTO: 1.7 10E9/L (ref 0.8–5.3)
LYMPHOCYTES NFR BLD AUTO: 18.1 %
MCH RBC QN AUTO: 30.6 PG (ref 26.5–33)
MCHC RBC AUTO-ENTMCNC: 32.6 G/DL (ref 31.5–36.5)
MCV RBC AUTO: 94 FL (ref 78–100)
MONOCYTES # BLD AUTO: 1 10E9/L (ref 0–1.3)
MONOCYTES NFR BLD AUTO: 10.2 %
NEUTROPHILS # BLD AUTO: 6.5 10E9/L (ref 1.6–8.3)
NEUTROPHILS NFR BLD AUTO: 69.1 %
NRBC # BLD AUTO: 0 10*3/UL
NRBC BLD AUTO-RTO: 0 /100
NT-PROBNP SERPL-MCNC: 2141 PG/ML (ref 0–1800)
PLATELET # BLD AUTO: 252 10E9/L (ref 150–450)
POTASSIUM SERPL-SCNC: 4.4 MMOL/L (ref 3.4–5.3)
PROT SERPL-MCNC: 6.3 G/DL (ref 6.8–8.8)
RBC # BLD AUTO: 4.31 10E12/L (ref 4.4–5.9)
SODIUM SERPL-SCNC: 139 MMOL/L (ref 133–144)
TROPONIN I SERPL-MCNC: 0.02 UG/L (ref 0–0.04)
WBC # BLD AUTO: 9.5 10E9/L (ref 4–11)

## 2018-12-21 PROCEDURE — 94640 AIRWAY INHALATION TREATMENT: CPT

## 2018-12-21 PROCEDURE — 40000275 ZZH STATISTIC RCP TIME EA 10 MIN

## 2018-12-21 PROCEDURE — 36415 COLL VENOUS BLD VENIPUNCTURE: CPT | Performed by: FAMILY MEDICINE

## 2018-12-21 PROCEDURE — 85025 COMPLETE CBC W/AUTO DIFF WBC: CPT | Performed by: FAMILY MEDICINE

## 2018-12-21 PROCEDURE — 94640 AIRWAY INHALATION TREATMENT: CPT | Mod: 76

## 2018-12-21 PROCEDURE — 40000270 ZZH STATISTIC OXYGEN  O2DAILY TECH TIME

## 2018-12-21 PROCEDURE — 83880 ASSAY OF NATRIURETIC PEPTIDE: CPT | Performed by: FAMILY MEDICINE

## 2018-12-21 PROCEDURE — 93005 ELECTROCARDIOGRAM TRACING: CPT

## 2018-12-21 PROCEDURE — 93010 ELECTROCARDIOGRAM REPORT: CPT | Mod: Z6 | Performed by: FAMILY MEDICINE

## 2018-12-21 PROCEDURE — 71046 X-RAY EXAM CHEST 2 VIEWS: CPT

## 2018-12-21 PROCEDURE — 99285 EMERGENCY DEPT VISIT HI MDM: CPT | Mod: 25

## 2018-12-21 PROCEDURE — 80053 COMPREHEN METABOLIC PANEL: CPT | Performed by: FAMILY MEDICINE

## 2018-12-21 PROCEDURE — 99285 EMERGENCY DEPT VISIT HI MDM: CPT | Mod: 25 | Performed by: FAMILY MEDICINE

## 2018-12-21 PROCEDURE — 84484 ASSAY OF TROPONIN QUANT: CPT | Performed by: FAMILY MEDICINE

## 2018-12-21 PROCEDURE — 25000125 ZZHC RX 250: Performed by: FAMILY MEDICINE

## 2018-12-21 RX ORDER — IPRATROPIUM BROMIDE AND ALBUTEROL SULFATE 2.5; .5 MG/3ML; MG/3ML
3 SOLUTION RESPIRATORY (INHALATION) ONCE
Status: COMPLETED | OUTPATIENT
Start: 2018-12-21 | End: 2018-12-21

## 2018-12-21 RX ADMIN — IPRATROPIUM BROMIDE AND ALBUTEROL SULFATE 3 ML: .5; 3 SOLUTION RESPIRATORY (INHALATION) at 20:22

## 2018-12-21 ASSESSMENT — ACTIVITIES OF DAILY LIVING (ADL): DEPENDENT_IADLS:: INDEPENDENT

## 2018-12-21 NOTE — PROGRESS NOTES
Clinic Care Coordination Contact    Clinic Care Coordination Contact  OUTREACH    Referral Information:  Referral Source: IP Handoff    Primary Diagnosis: COPD    Chief Complaint   Patient presents with     Clinic Care Coordination - Post Hospital     Care Team        [employer] Utilization:   Clinic Utilization  Difficulty keeping appointments:: No  Compliance Concerns: No  No-Show Concerns: No  No PCP office visit in Past Year: No  Utilization    Last refreshed: 12/21/2018  7:31 AM:  Hospital Admissions 1           Last refreshed: 12/21/2018  7:31 AM:  ED Visits 0           Last refreshed: 12/21/2018  7:31 AM:  No Show Count (past year) 0              Current as of: 12/21/2018  7:31 AM              Clinical Concerns:  Current Medical Concerns:  Patient's wife reporting Patient is doing well at home. O2 resumed without complication. Patient was able to rest last night and is now getting around the home without issue. No concerns reported.     Current Behavioral Concerns: none    Education Provided to patient: Encouraged follow up appointment with PCP.         Medication Management:  Patient is knowledgeable on medications and is adherent. No financial concerns reported at this time.        Functional Status:  Dependent ADLs:: Independent  Dependent IADLs:: Independent  Bed or wheelchair confined:: No  Mobility Status: Independent  Fallen 2 or more times in the past year?: No  Any fall with injury in the past year?: No    Living Situation:  Current living arrangement:: I live in a private home with spouse  Type of residence:: Private home - no stairs    Diet/Exercise/Sleep:  Diet:: Regular  Inadequate nutrition (GOAL):: No  Food Insecurity: No  Tube Feeding: No  Exercise:: Currently not exercising    Transportation:  Transportation concerns (GOAL):: No  Transportation means:: Regular car, Family     Psychosocial:  Episcopalian or spiritual beliefs that impact treatment:: No  Mental health DX:: No  Mental health  management concern (GOAL):: No  Informal Support system:: Family, Friends, Spouse     Financial/Insurance:   Financial/Insurance concerns (GOAL):: No       Resources and Interventions:  Current Resources:    ;   Community Resources: None  Supplies used at home:: None  Equipment Currently Used at Home: none         Referrals Placed: None        Future Appointments              In 6 days Post, MARKOS Pierre MD [facility]          Plan: 1) Patient will continue to follow treatment plan as directed and follow up with PCP with concerns ongoing.   2) Care Coordination to remain available for future needs. Follow up planned for 2 weeks unless otherwise notified.     Juve Gordon RN  Clinic Care Coordinator  [phone] or [phone]

## 2018-12-21 NOTE — LETTER
Health Care Home - Access Care Plan    About Me  Patient Name:  Chadwick Matthew    YOB: 1945  Age:                             73 year old   Vonda MRN:            [id number] Telephone Information:   Home Phone [phone]   Mobile Not on file.       Address:    [address] [place] [address] Email address:  No e-mail address on record      Emergency Contact(s)  Name Relationship Lgl Grd Work Phone Home Phone Mobile Phone   1. LUIS E MATTHEW* Spouse No  [phone]    2. ZAIDA GROSSMAN Grandchild No   [phone]   3. JASE HENNESSY*  No   [phone]   4. BARTOLO VERDUZCO Daughter No   [phone]             Health Maintenance:   Health Maintenance Due   Topic Date Due     ZOSTER IMMUNIZATION (1 of 2) 03/11/1993     AORTIC ANEURYSM SCREENING (SYSTEM ASSIGNED)  03/11/2008     PHQ-2 Q1 YR  11/29/2018     FALL RISK ASSESSMENT  11/29/2018     COPD ACTION PLAN Q1 YR  12/06/2018       My Access Plan  Medical Emergency 917   Questions or concerns during clinic hours Primary Clinic Line, I will call the clinic directly: [facility] - [phone]   24 Hour Appointment Line [phone] or  [phone]) (toll free)   24 Hour Nurse Line [phone] (toll free)   Questions or concerns outside clinic hours 24 Hour Appointment Line, I will call the after-hours on-call line:   [facility] [phone] or [phone]) (toll-free)   Preferred Urgent Care [facility], [phone]   Preferred Hospital [place], [place]  [phone]   Preferred Pharmacy [facility] [place], [place] - [address]     Behavioral Health Crisis Line The National Suicide Prevention Lifeline at 1-914.986.7221 or 911     My Care Team Members  Patient Care Team       Relationship Specialty Notifications Start End    Jessi Hills MD PCP - General Family Practice  1/27/17     Phone:  [phone] Fax: [phone] [address] [facility] [address]    Jessi Hills MD PCP - Assigned PCP   2/19/17     Phone: [phone] Fax: [phone] [address] [facility] [address]    Boo Gordon, RN Clinic Care Coordinator Primary Care -   12/21/18     Phone: [phone] Fax: [phone]         [address] [place] [address]           My Medical and Care Information  Problem List   Patient Active Problem List   Diagnosis     PVD (peripheral vascular disease) (H)     Chronic obstructive pulmonary disease, unspecified COPD type (H)     Dementia without behavioral disturbance, unspecified dementia type     Claudication (H)     Arteriosclerotic vascular disease     Essential hypertension, benign     Hyperlipidemia LDL goal <70     Advanced directives, counseling/discussion     COPD exacerbation (H)     Paroxysmal atrial fibrillation (H)     Tobacco abuse     Acute bronchitis due to respiratory syncytial virus (RSV)      Current Medications and Allergies:  See printed Medication Report

## 2018-12-21 NOTE — ED AVS SNAPSHOT
[facility] Emergency Department  [address] [facility] [facility] [address]  Phone:  [phone]  Fax:  [phone]                                    Chadwick Aguilar   MRN: [id number]    Department:  [facility] Emergency Department   Date of Visit:  12/21/2018           After Visit Summary Signature Page    I have received my discharge instructions, and my questions have been answered. I have discussed any challenges I see with this plan with the nurse or doctor.    ..........................................................................................................................................  Patient/Patient Representative Signature      ..........................................................................................................................................  Patient Representative Print Name and Relationship to Patient    ..................................................               ................................................  Date                                   Time    ..........................................................................................................................................  Reviewed by Signature/Title    ...................................................              ..............................................  Date                                               Time          22EPIC Rev 08/18

## 2018-12-21 NOTE — LETTER
[place] CARE COORDINATION  [facility]  [address]  [facility] [address]  Phone: [phone]    December 21, 2018    Chadwick Aguilar  [address] [place] [address]      Dear Chadwick,    I am a clinic care coordinator who works with Jessi Hills MD at [facility]. I wanted to thank you for spending the time to talk with me.  I wanted to introduce myself and provide you with my contact information so that you can call me with questions or concerns about your health care. Below is a description of clinic care coordination and how I can further assist you.     The clinic care coordinator is a registered nurse and/or  who understand the health care system. The goal of clinic care coordination is to help you manage your health and improve access to the [facility] system in the most efficient manner. The registered nurse can assist you in meeting your health care goals by providing education, coordinating services, and strengthening the communication among your providers. The  can assist you with financial, behavioral, psychosocial, chemical dependency, counseling, and/or psychiatric resources.    Please feel free to contact me at [phone], [phone], with any questions or concerns. We at [facility] are focused on providing you with the highest-quality healthcare experience possible and that all starts with you.     Sincerely,     Juve Gordon RN  Clinic Care Coordinator    Enclosed: I have enclosed a copy of a 24 Hour Access Plan. This has helpful phone numbers for you to call when needed. Please keep this in an easy to access place to use as needed.

## 2018-12-22 NOTE — ED PROVIDER NOTES
History     Chief Complaint   Patient presents with     Shortness of Breath     discharged yesterday from hospital     HPI    Chadwick Aguilar is a 73 year old male who was brought in by EMS because of shortness of breath.  History taking is compromised because he has a history of dementia although he will answer questions and seems to understand some of his current condition.  He is currently alone.  Paramedics took his wife back to the house to retrieve equipment she is not available nor are they interview.  He was discharged from the hospital yesterday for respiratory failure due to COPD exacerbation from RSV infection.  He was on BiPAP in the hospital and transition to his normal home O2 which he uses at 2 L.  He was treated with steroids and bronchodilators.  He was discharged on prednisone and nebs.  He had short course of antibiotic therapy until viral testing came back showing RSV.  He tells me that this evening he became short of breath when he developed cough and he coughed so hard it made his chest hurt.  He said he was unable to bring up any phlegm.  That episode of coughing resolved and so has his chest pain.  He does not think he has had any further fevers at home.  After arrival here and a DuoNeb and on his home O2 of 2 L by nasal cannula he no longer feels dyspneic and states that he feels better.  He denies abdominal pain, nausea, vomiting, bowel or bladder dysfunction.  During his previous hospitalization he was diagnosed with atrial fibrillation which was apparently new.  He is unaware of this.  He was started on rivaroxaban.    21:00: His wife arrived and I was able to interview her.  She states that he seemed to become suddenly short of breath just prior to her calling paramedics.  She said he did well after discharge and slept until 10:00 this morning uneventfully.  She said that he ate a good breakfast and had been doing well.  She had just given him a neb treatment when he started  coughing and said he could not breathe.  She called paramedics who transported him here.  Now he looks back to his baseline according to her.  He also reports that he feels back to baseline with no pain and no dyspnea.  She reports she is also had an upper respiratory infection similar to his recent RSV infection.    Problem List:    Patient Active Problem List    Diagnosis Date Noted     Acute bronchitis due to respiratory syncytial virus (RSV) 12/20/2018     Priority: Medium     Paroxysmal atrial fibrillation (H) 12/17/2018     Priority: Medium     Tobacco abuse 12/17/2018     Priority: Medium     COPD exacerbation (H) 12/16/2018     Priority: Medium     Advanced directives, counseling/discussion 03/17/2017     Priority: Medium     Patient does not have an Advance/Health Care Directive (HCD), declines information/referral.    Antonia Neely  March 17, 2017         Claudication (H) 02/01/2017     Priority: Medium     Arteriosclerotic vascular disease 02/01/2017     Priority: Medium     Essential hypertension, benign 02/01/2017     Priority: Medium     Hyperlipidemia LDL goal <70 02/01/2017     Priority: Medium     PVD (peripheral vascular disease) (H) 01/20/2017     Priority: Medium     Has stent in left leg.       Dementia without behavioral disturbance, unspecified dementia type 01/20/2017     Priority: Medium     Chronic obstructive pulmonary disease, unspecified COPD type (H) 01/01/2014     Priority: Medium        Past Medical History:    Past Medical History:   Diagnosis Date     COPD (chronic obstructive pulmonary disease) (H)      Peripheral vascular disease (H)        Past Surgical History:    Past Surgical History:   Procedure Laterality Date     AS REVISE THUMB TENDON Right      C BALLN ANGIOPLASTY OPEN,ILIAC  2016     COLONOSCOPY  2012     CV PERCUTANEOUS TRANSLUMINAL CORONARY ANGIOPLASTY  12/2016    No stent needed     TONSILLECTOMY & ADENOIDECTOMY         Family History:    Family History   Problem  "Relation Age of Onset     No Known Problems Mother      No Known Problems Father        Social History:  Marital Status:   [2]  Social History     Tobacco Use     Smoking status: Current Some Day Smoker     Years: 50.00     Types: Cigarettes     Last attempt to quit: 2016     Years since quittin.9     Smokeless tobacco: Never Used     Tobacco comment: uses patches PRN ? 1 a day occassionally   Substance Use Topics     Alcohol use: Yes     Comment: occassionally     Drug use: No        Medications:      albuterol (2.5 MG/3ML) 0.083% neb solution   albuterol (PROVENTIL) (2.5 MG/3ML) 0.083% neb solution   aspirin 81 MG chewable tablet   citalopram (CELEXA) 20 MG tablet   fluticasone-salmeterol (ADVAIR DISKUS) 250-50 MCG/DOSE diskus inhaler   lisinopril (PRINIVIL/ZESTRIL) 40 MG tablet   memantine (NAMENDA) 10 MG tablet   metoprolol succinate ER (TOPROL-XL) 100 MG 24 hr tablet   Multiple Vitamins-Minerals (MULTIVITAMIN ADULT PO)   nitroGLYcerin (NITROSTAT) 0.4 MG sublingual tablet   order for DME   order for DME   predniSONE (DELTASONE) 20 MG tablet   rivaroxaban ANTICOAGULANT (XARELTO) 20 MG TABS tablet   simvastatin (ZOCOR) 10 MG tablet   Thiamine HCl (VITAMIN B-1 PO)   tiotropium (SPIRIVA HANDIHALER) 18 MCG capsule         Review of Systems  All other systems are reviewed and are negative    Physical Exam   BP: (!) 177/95  Pulse: 80  Heart Rate: 76  Temp: 98.3  F (36.8  C)  Resp: 24  Height: 167.6 cm (5' 6\")  SpO2: 92 %      Physical Exam  Nursing note and vitals were reviewed.  Constitutional: Awake and alert, adequately nourished and developed appearing 73-year-old in no apparent discomfort, who appears chronically but not acutely ill, and who answers questions and cooperates with examination.  HEENT: EOMI.  PERRLA.  EACs clear.  TMs normal.  Oropharynx unremarkable.    Neck: Freely mobile.  Cardiovascular: Cardiac examination reveals normal heart rate and regular rhythm without " murmur.  Pulmonary/Chest: Breathing is unlabored.  O2 sats 98% on room air.  Wheezes are present throughout the lung fields with moderate prolongation of the expiratory phase without rales or rhonchi.  No retractions or tachypnea.  Abdomen: Soft, nontender, no HSM or masses rebound or guarding.  Musculoskeletal: Extremities are warm and well-perfused and without edema  Neurological: Alert, oriented to self and place but not date.  Skin: Warm, dry, no rashes.  Psychiatric: Affect is calm.    ED Course        Procedures               EKG Interpretation:      Interpreted by Antelmo Kidd  Time reviewed: 20:29  Symptoms at time of EKG: dyspnea   Rhythm: normal sinus   Rate: normal  Axis: normal  Ectopy: none  Conduction: abn P waves, but atrial rhythm. QRS normal  ST Segments/ T Waves: No ST-T wave changes  Q Waves: none  Comparison to prior: Compared to the EKG done December 16, 2018, atrial fibrillation has resolved.    Clinical Impression: Abnormal P wave morphology but otherwise normal EKG      Critical Care time:  none               Results for orders placed or performed during the hospital encounter of 12/21/18 (from the past 24 hour(s))   CBC with platelets differential   Result Value Ref Range    WBC 9.5 4.0 - 11.0 10e9/L    RBC Count 4.31 (L) 4.4 - 5.9 10e12/L    Hemoglobin 13.2 (L) 13.3 - 17.7 g/dL    Hematocrit 40.5 40.0 - 53.0 %    MCV 94 78 - 100 fl    MCH 30.6 26.5 - 33.0 pg    MCHC 32.6 31.5 - 36.5 g/dL    RDW 12.8 10.0 - 15.0 %    Platelet Count 252 150 - 450 10e9/L    Diff Method Automated Method     % Neutrophils 69.1 %    % Lymphocytes 18.1 %    % Monocytes 10.2 %    % Eosinophils 0.4 %    % Basophils 0.4 %    % Immature Granulocytes 1.8 %    Nucleated RBCs 0 0 /100    Absolute Neutrophil 6.5 1.6 - 8.3 10e9/L    Absolute Lymphocytes 1.7 0.8 - 5.3 10e9/L    Absolute Monocytes 1.0 0.0 - 1.3 10e9/L    Absolute Eosinophils 0.0 0.0 - 0.7 10e9/L    Absolute Basophils 0.0 0.0 - 0.2 10e9/L    Abs Immature  Granulocytes 0.2 0 - 0.4 10e9/L    Absolute Nucleated RBC 0.0    Comprehensive metabolic panel   Result Value Ref Range    Sodium 139 133 - 144 mmol/L    Potassium 4.4 3.4 - 5.3 mmol/L    Chloride 101 94 - 109 mmol/L    Carbon Dioxide 31 20 - 32 mmol/L    Anion Gap 7 3 - 14 mmol/L    Glucose 97 70 - 99 mg/dL    Urea Nitrogen 19 7 - 30 mg/dL    Creatinine 0.77 0.66 - 1.25 mg/dL    GFR Estimate 88 >60 mL/min/[1.73_m2]    GFR Estimate If Black >90 >60 mL/min/[1.73_m2]    Calcium 8.4 (L) 8.5 - 10.1 mg/dL    Bilirubin Total 0.3 0.2 - 1.3 mg/dL    Albumin 3.3 (L) 3.4 - 5.0 g/dL    Protein Total 6.3 (L) 6.8 - 8.8 g/dL    Alkaline Phosphatase 32 (L) 40 - 150 U/L    ALT 33 0 - 70 U/L    AST 22 0 - 45 U/L   Nt probnp inpatient (BNP)   Result Value Ref Range    N-Terminal Pro BNP Inpatient 2,141 (H) 0 - 1,800 pg/mL   Troponin I   Result Value Ref Range    Troponin I ES 0.022 0.000 - 0.045 ug/L   XR Chest 2 Views    Narrative    CHEST TWO VIEWS 12/21/2018 10:28 PM     HISTORY: Dyspnea.    COMPARISON: December 17, 2018     FINDINGS: There are no acute infiltrates. The cardiac silhouette is  not enlarged. Pulmonary vasculature is unremarkable.      Impression    IMPRESSION: No acute disease.    LISA MALAVE MD       Medications   ipratropium - albuterol 0.5 mg/2.5 mg/3 mL (DUONEB) neb solution 3 mL (3 mLs Nebulization Given 12/21/18 2022)       Assessments & Plan (with Medical Decision Making)     73-year-old male discharged yesterday from hospitalization for acute exacerbation of COPD due to RSV infection presents by ambulance after an episode of coughing and unable to catch his breath.  Since arrival he has been feeling fine.  He does not have any chest pain in his breathing is comfortable with his normal supplemental 2 L of O2 by nasal cannula.  His workup in the emergency department shows no evidence of a new acute process with an unremarkable chest x-ray and reassuring troponin, BNP, CBC, chemistries which appear to be at  baseline.  I do not see any evidence of any acute decompensation and feel comfortable with discharge home.  He and his wife are also comfortable with discharge home.  They will return if he develops any new concerning symptoms.    I have reviewed the nursing notes.    I have reviewed the findings, diagnosis, plan and need for follow up with the patient.          Medication List      There are no discharge medications for this visit.         Final diagnoses:   Chronic obstructive pulmonary disease with acute lower respiratory infection (H)       12/21/2018   [facility] EMERGENCY DEPARTMENT     Antelmo Kidd MD  12/22/18 [id number]

## 2018-12-22 NOTE — ED TRIAGE NOTES
Pt here for worsening shortness of breath, left sided chest pain. Discharged from hospital yesterday. Arrives to ED via EMS, trial of bipap and 2 nebs per ems, pt reports no relief from either.

## 2018-12-22 NOTE — DISCHARGE INSTRUCTIONS
Return if fevers greater than 100.4, increased difficulty breathing, chest pain, or other new concerning symptoms.

## 2018-12-31 ENCOUNTER — OFFICE VISIT (OUTPATIENT)
Dept: FAMILY MEDICINE | Facility: CLINIC | Age: 73
End: 2018-12-31
Payer: COMMERCIAL

## 2018-12-31 VITALS
SYSTOLIC BLOOD PRESSURE: 146 MMHG | OXYGEN SATURATION: 94 % | RESPIRATION RATE: 20 BRPM | HEART RATE: 73 BPM | BODY MASS INDEX: 24.58 KG/M2 | DIASTOLIC BLOOD PRESSURE: 80 MMHG | TEMPERATURE: 97.8 F | HEIGHT: 67 IN | WEIGHT: 156.6 LBS

## 2018-12-31 DIAGNOSIS — J44.1 COPD EXACERBATION (H): Primary | ICD-10-CM

## 2018-12-31 DIAGNOSIS — I10 ESSENTIAL HYPERTENSION, BENIGN: ICD-10-CM

## 2018-12-31 DIAGNOSIS — I48.0 PAROXYSMAL ATRIAL FIBRILLATION (H): ICD-10-CM

## 2018-12-31 DIAGNOSIS — F03.90 DEMENTIA WITHOUT BEHAVIORAL DISTURBANCE, UNSPECIFIED DEMENTIA TYPE: ICD-10-CM

## 2018-12-31 DIAGNOSIS — I73.9 PVD (PERIPHERAL VASCULAR DISEASE) (H): ICD-10-CM

## 2018-12-31 DIAGNOSIS — J44.9 CHRONIC OBSTRUCTIVE PULMONARY DISEASE, UNSPECIFIED COPD TYPE (H): ICD-10-CM

## 2018-12-31 PROCEDURE — 99214 OFFICE O/P EST MOD 30 MIN: CPT | Performed by: FAMILY MEDICINE

## 2018-12-31 ASSESSMENT — MIFFLIN-ST. JEOR: SCORE: 1399.99

## 2018-12-31 NOTE — PROGRESS NOTES
"  SUBJECTIVE:   Chadwick Aguilar is a 73 year old male who presents to clinic today for the following health issues:          Hospital Follow-up Visit:    Hospital/Nursing Home/IP Rehab Facility: [facility]  Date of Admission: 12/16/2018  Date of Discharge: 12/20/2018  Reason(s) for Admission: COPD            Problems taking medications regularly:  None       Medication changes since discharge: He was started on Xarelto in the hospital because of the atrial fibrillation, and wife was not clear whether he should be taken off of the clopridogel (this was started 2 years ago when he had a stent placed in his leg for peripheral vascular disease)       Problems adhering to non-medication therapy:  None    Summary of hospitalization:  [facility] discharge summary reviewed  Diagnostic Tests/Treatments reviewed.  Follow up needed: none  Other Healthcare Providers Involved in Patient s Care:         None  Update since discharge: improved.     Post Discharge Medication Reconciliation: discharge medications reconciled and changed, per note/orders (see AVS).  Plan of care communicated with patient and family (wife)     Coding guidelines for this visit:  Type of Medical   Decision Making Face-to-Face Visit       within 7 Days of discharge Face-to-Face Visit        within 14 days of discharge   Moderate Complexity [id number]   High Complexity [id number]                  Problem list and histories reviewed & adjusted, as indicated.  Additional history: none        Reviewed and updated as needed this visit by clinical staff  Allergies  Meds       Reviewed and updated as needed this visit by Provider               ROS:  Constitutional, HEENT, cardiovascular, pulmonary, gi and gu systems are negative, except as otherwise noted.        OBJECTIVE:                                                    /80   Pulse 73   Temp 97.8  F (36.6  C)   Resp 20   Ht 1.695 m (5' 6.75\")   Wt 71 kg (156 lb 9.6 oz)   " SpO2 94%   BMI 24.71 kg/m      GENERAL: alert and pleasantly confused elderly male with oxygen by nasal prongs  EYES: Eyes grossly normal to inspection, extraocular movements - intact, and PERRL  NECK: no tenderness, no adenopathy, no asymmetry, no masses, no stiffness; thyroid- normal to palpation  RESP: lungs clear to auscultation - no rales, no rhonchi, no wheezes  CV: irregularly irregular rhythm, rate is controlled now  MS: extremities- no gross deformities noted, no edema         ASSESSMENT/PLAN:                                                    ASSESSMENT:  1. COPD exacerbation (H)    2. Paroxysmal atrial fibrillation (H)    3. Chronic obstructive pulmonary disease, unspecified COPD type (H)    4. Essential hypertension, benign    5. PVD (peripheral vascular disease) (H)    6. Dementia without behavioral disturbance, unspecified dementia type        PLAN:  Orders Placed This Encounter     rivaroxaban ANTICOAGULANT (XARELTO) 20 MG TABS tablet   Below was a recommendation by cardiology while he was in the hospital    Patient Instructions   To clarify your medications, the clopridogel has been discontinued. You have been put on Xarelto to reduce the risk of stroke. Also, continue the aspirin 81 mg daily.    All your other medications are the same.     Recheck with Dr Hills in one month.      MARKOS Pierre Post  [facility]

## 2018-12-31 NOTE — PATIENT INSTRUCTIONS
To clarify your medications, the clopridogel has been discontinued. You have been put on Xarelto to reduce the risk of stroke. Also, continue the aspirin 81 mg daily.    All your other medications are the same.     Recheck with Dr Hills in one month.

## 2019-01-01 ENCOUNTER — MEDICAL CORRESPONDENCE (OUTPATIENT)
Dept: HEALTH INFORMATION MANAGEMENT | Facility: CLINIC | Age: 74
End: 2019-01-01

## 2019-01-01 ENCOUNTER — TELEPHONE (OUTPATIENT)
Dept: FAMILY MEDICINE | Facility: CLINIC | Age: 74
End: 2019-01-01

## 2019-01-04 ENCOUNTER — TELEPHONE (OUTPATIENT)
Dept: FAMILY MEDICINE | Facility: CLINIC | Age: 74
End: 2019-01-04

## 2019-01-04 NOTE — TELEPHONE ENCOUNTER
Reason for Call:  Cold sweats    Detailed comments: patients wife is calling stating that her  was hospitalized before Christmas with RSV. He also has Dementia, COPD and an irregular heart beat. While in the hospital they changed his Plavix to Xaralto. He is sitting on the couch right now in a cold sweat, able to speak, just doesn't know what hurts or is wrong. Please call to advise. Could his new medication be doing this?    Phone Number Patient can be reached at: Home number on file [phone] (home)    Best Time: any    Can we leave a detailed message on this number? YES   Marisol Starkey  [facility]      Call taken on 1/4/2019 at 8:54 AM by Marisol Starkey

## 2019-01-04 NOTE — TELEPHONE ENCOUNTER
"S-(situation): Excessive Sweating    B-(background): Was hospitalized in the end of December for RSV and COPD    A-(assessment): Wife calling reporting symptoms. She states patient is in a cold sweat and \"he is really sweating but feels cold, his hair is even wet\". She reports his heart is racing and he is not really responding. She states when she asks him a question all she gets in return is an \"I don't know\". She asks him where he hurts, or if its his heart and all he says is \"I don't know\". Wife reports he is on oxygen 24/7 and he does not appear to be having trouble breathing.    R-(recommendations): As advised by Telephone Triage Protocols for Nurses (Jeff, 2016) seek emergency cares now, wife agrees with plan.  Miriam BIGGS RN      "

## 2019-01-07 NOTE — MR AVS SNAPSHOT
After Visit Summary   9/7/2017    Chadwick Aguilar    MRN: [id number]           Patient Information     Date Of Birth          1945        Visit Information        Provider Department      9/7/2017 10:20 AM Jessi Hills MD [facility]        Today's Diagnoses     Screening for colon cancer    -  1      Care Instructions          Thank you for choosing [facility].  You may be receiving a survey in the mail from [facility] regarding your visit today.  Please take a few minutes to complete and return the survey to let us know how we are doing.      Our Clinic hours are:  Mondays    7:20 am - 7 pm  Tues -  Fri  7:20 am - 5 pm    Clinic Phone: [phone]    The clinic lab opens at 7:30 am Mon - Fri and appointments are required.    [facility]  Ph. [phone]  Monday-Thursday 8 am - 7pm  Tues/Wed/Fri 8 am - 5:30 pm                 Follow-ups after your visit        Future tests that were ordered for you today     Open Future Orders        Priority Expected Expires Ordered    Fecal colorectal cancer screen (FIT) Routine 9/28/2017 11/30/2017 9/7/2017            Who to contact     If you have questions or need follow up information about today's clinic visit or your schedule please contact [facility] directly at [phone].  Normal or non-critical lab and imaging results will be communicated to you by MyChart, letter or phone within 4 business days after the clinic has received the results. If you do not hear from us within 7 days, please contact the clinic through MyChart or phone. If you have a critical or abnormal lab result, we will notify you by phone as soon as possible.  Submit refill requests through [employer] or call your pharmacy and they will forward the refill request to us. Please allow 3 business days for your refill to be completed.          Additional Information About Your Visit        MyChart Information   Full report given to Sandee Graham about an hour ago. Belongings and skin assessed with both RNs. No adverse reactions. Transported to [facility], rm [address] with 2 RNs and significant other. "   [employer] lets you send messages to your doctor, view your test results, renew your prescriptions, schedule appointments and more. To sign up, go to [email or web address] . Click on \"Log in\" on the left side of the screen, which will take you to the Welcome page. Then click on \"Sign up Now\" on the right side of the page.     You will be asked to enter the access code listed below, as well as some personal information. Please follow the directions to create your username and password.     Your access code is: [email or web address]  Expires: 2017 11:27 AM     Your access code will  in 90 days. If you need help or a new code, please call your [place] clinic or [phone].        Care EveryWhere ID     This is your Care EveryWhere ID. This could be used by other organizations to access your [place] medical records  [id number]        Your Vitals Were     Pulse Temperature Height Pulse Oximetry BMI (Body Mass Index)       60 97.5  F (36.4  C) (Tympanic) 5' 6.5\" (1.689 m) 94% 23.31 kg/m2        Blood Pressure from Last 3 Encounters:   17 148/79   17 132/77   08/15/17 (!) 173/92    Weight from Last 3 Encounters:   17 146 lb 9.6 oz (66.5 kg)   17 145 lb 12.8 oz (66.1 kg)   17 140 lb (63.5 kg)               Primary Care Provider Office Phone # Fax #    Jessi Danni Hills [phone] [address] [facility] [address]        Equal Access to Services     [facility] AH: Hadii lisa Prater, waflaquitada luqadaha, qaybta kaalmada diego, chloe martínez. So [facility] [phone].    ATENCIÓN: Si habla español, tiene a tamez disposición servicios gratuitos de asistencia lingüística. Llame al [phone].    We comply with applicable federal civil rights laws and [place] laws. We do not discriminate on the basis of race, color, national origin, age, disability sex, sexual orientation or gender identity.            Thank you!     " Thank you for choosing [facility]  for your care. Our goal is always to provide you with excellent care. Hearing back from our patients is one way we can continue to improve our services. Please take a few minutes to complete the written survey that you may receive in the mail after your visit with us. Thank you!             Your Updated Medication List - Protect others around you: Learn how to safely use, store and throw away your medicines at www.disposemymeds.org.          This list is accurate as of: 9/7/17 11:02 AM.  Always use your most recent med list.                   Brand Name Dispense Instructions for use Diagnosis    aspirin 81 MG tablet      Take 81 mg by mouth daily        budesonide 0.5 MG/2ML neb solution    PULMICORT    120 mL    Take 2 mLs (0.5 mg) by nebulization 2 times daily    Chronic obstructive pulmonary disease, unspecified COPD type (H)       citalopram 20 MG tablet    celeXA    90 tablet    Take 1 tablet (20 mg) by mouth daily    Dementia without behavioral disturbance, unspecified dementia type       clopidogrel 75 MG tablet    PLAVIX    90 tablet    Take 1 tablet (75 mg) by mouth daily        lisinopril 40 MG tablet    PRINIVIL/ZESTRIL    90 tablet    Take 1 tablet (40 mg) by mouth daily    Essential hypertension, benign       memantine 10 MG tablet    NAMENDA    180 tablet    Take 1 tablet (10 mg) by mouth 2 times daily    Dementia without behavioral disturbance, unspecified dementia type       metoprolol 100 MG 24 hr tablet    TOPROL-XL    90 tablet    Take 1 tablet (100 mg) by mouth daily    Essential hypertension, benign       simvastatin 5 MG tablet    ZOCOR    90 tablet    Take 1 tablet (5 mg) by mouth At Bedtime    Arteriosclerotic vascular disease       VITAMIN B-1 PO      Take 250 mg by mouth daily

## 2019-01-15 ENCOUNTER — PATIENT OUTREACH (OUTPATIENT)
Dept: CARE COORDINATION | Facility: CLINIC | Age: 74
End: 2019-01-15

## 2019-01-15 ASSESSMENT — ACTIVITIES OF DAILY LIVING (ADL): DEPENDENT_IADLS:: INDEPENDENT

## 2019-01-15 NOTE — PROGRESS NOTES
Clinic Care Coordination Contact  [facility]/Voicemail    Referral Source: IP Handoff  Clinical Data: Care Coordinator Outreach  Outreach attempted.  Left message on voicemail with call back information and requested return call.  Plan: Care Coordinator mailed out care coordination introduction letter on 12-21. Care Coordinator will try to reach patient again in 3-5 business days.  Juve Gordon RN  Clinic Care Coordinator  [phone] or [phone]

## 2019-01-25 ENCOUNTER — OFFICE VISIT (OUTPATIENT)
Dept: FAMILY MEDICINE | Facility: CLINIC | Age: 74
End: 2019-01-25
Payer: COMMERCIAL

## 2019-01-25 ENCOUNTER — MEDICAL CORRESPONDENCE (OUTPATIENT)
Dept: HEALTH INFORMATION MANAGEMENT | Facility: CLINIC | Age: 74
End: 2019-01-25

## 2019-01-25 VITALS
OXYGEN SATURATION: 98 % | DIASTOLIC BLOOD PRESSURE: 70 MMHG | RESPIRATION RATE: 24 BRPM | BODY MASS INDEX: 24.48 KG/M2 | HEART RATE: 63 BPM | HEIGHT: 67 IN | TEMPERATURE: 98.1 F | WEIGHT: 156 LBS | SYSTOLIC BLOOD PRESSURE: 117 MMHG

## 2019-01-25 DIAGNOSIS — I73.9 PVD (PERIPHERAL VASCULAR DISEASE) (H): ICD-10-CM

## 2019-01-25 DIAGNOSIS — I48.0 PAROXYSMAL ATRIAL FIBRILLATION (H): ICD-10-CM

## 2019-01-25 DIAGNOSIS — J44.9 CHRONIC OBSTRUCTIVE PULMONARY DISEASE, UNSPECIFIED COPD TYPE (H): ICD-10-CM

## 2019-01-25 PROCEDURE — 99214 OFFICE O/P EST MOD 30 MIN: CPT | Performed by: FAMILY MEDICINE

## 2019-01-25 ASSESSMENT — MIFFLIN-ST. JEOR: SCORE: 1397.27

## 2019-01-25 ASSESSMENT — PAIN SCALES - GENERAL: PAINLEVEL: NO PAIN (0)

## 2019-01-25 NOTE — PROGRESS NOTES
SUBJECTIVE:   Chadwick Aguilar is a 73 year old male who presents to clinic today for the following health issues:    Chief Complaint   Patient presents with     Memory Loss     Was told to follow up on memory     COPD     Post hosp check. Discharged 12/20/18 and was seen in ED 12/21/19 . Using albuterol nebs every night.            Hospital Follow-up Visit:    Hospital/Nursing Home/IP Rehab Facility: [facility]  Date of Admission: 12/16/18  Date of Discharge: 12/20/18  Reason(s) for Admission: COPD            Problems taking medications regularly:  None       Medication changes since discharge: None       Problems adhering to non-medication therapy:  None    Summary of hospitalization:  [facility] discharge summary reviewed  Diagnostic Tests/Treatments reviewed.  Follow up needed: none  Other Healthcare Providers Involved in Patient s Care:         None  Update since discharge: improved. Reports breathing much improved and close to baseline.    Post Discharge Medication Reconciliation: discharge medications reconciled, continue medications without change.  Plan of care communicated with patient and family     Coding guidelines for this visit:  Type of Medical   Decision Making Face-to-Face Visit       within 7 Days of discharge Face-to-Face Visit        within 14 days of discharge   Moderate Complexity [id number]   High Complexity [id number]          ADDITIONAL HPI: 73 year old male here for above issue.      Per ER visit:    Chadwick Aguilar is a 73 year old male who was brought in by EMS because of shortness of breath.  History taking is compromised because he has a history of dementia although he will answer questions and seems to understand some of his current condition.  He is currently alone.  Paramedics took his wife back to the house to retrieve equipment she is not available nor are they interview.  He was discharged from the hospital yesterday for respiratory failure due to  COPD exacerbation from RSV infection.  He was on BiPAP in the hospital and transition to his normal home O2 which he uses at 2 L.  He was treated with steroids and bronchodilators.  He was discharged on prednisone and nebs.  He had short course of antibiotic therapy until viral testing came back showing RSV.      ROS: 10 point review of systems negative except as per HPI.    PAST MEDICAL HISTORY:  Past Medical History:   Diagnosis Date     COPD (chronic obstructive pulmonary disease) (H)      Peripheral vascular disease (H)         ACTIVE MEDICAL PROBLEMS:  Patient Active Problem List   Diagnosis     PVD (peripheral vascular disease) (H)     Chronic obstructive pulmonary disease, unspecified COPD type (H)     Dementia without behavioral disturbance, unspecified dementia type     Claudication (H)     Arteriosclerotic vascular disease     Essential hypertension, benign     Hyperlipidemia LDL goal <70     Advanced directives, counseling/discussion     COPD exacerbation (H)     Paroxysmal atrial fibrillation (H)     Tobacco abuse     Acute bronchitis due to respiratory syncytial virus (RSV)        FAMILY HISTORY:  Family History   Problem Relation Age of Onset     No Known Problems Mother      No Known Problems Father        SOCIAL HISTORY:  Social History     Socioeconomic History     Marital status:      Spouse name: Not on file     Number of children: Not on file     Years of education: Not on file     Highest education level: Not on file   Social Needs     Financial resource strain: Not on file     Food insecurity - worry: Not on file     Food insecurity - inability: Not on file     Transportation needs - medical: Not on file     Transportation needs - non-medical: Not on file   Occupational History     Not on file   Tobacco Use     Smoking status: Current Some Day Smoker     Years: 50.00     Types: Cigarettes     Last attempt to quit: 2016     Years since quittin.1     Smokeless tobacco: Never Used      Tobacco comment: uses patches PRN ? 1 a day occassionally   Substance and Sexual Activity     Alcohol use: Yes     Comment: occassionally     Drug use: No     Sexual activity: Not on file   Other Topics Concern     Parent/sibling w/ CABG, MI or angioplasty before 65F 55M? Not Asked   Social History Narrative     Not on file       MEDICATIONS:  Current Outpatient Medications   Medication Sig Dispense Refill     albuterol (2.5 MG/3ML) 0.083% neb solution Take 1 vial (2.5 mg) by nebulization every 6 hours as needed for shortness of breath / dyspnea or wheezing 25 vial 11     albuterol (PROVENTIL) (2.5 MG/3ML) 0.083% neb solution Take 1 vial (2.5 mg) by nebulization every 6 hours as needed for shortness of breath / dyspnea or wheezing 120 vial 3     aspirin 81 MG chewable tablet Take 1 tablet (81 mg) by mouth daily (Patient taking differently: Take 81 mg by mouth every evening ) 108 tablet 3     citalopram (CELEXA) 20 MG tablet Take 1 tablet (20 mg) by mouth daily (Patient taking differently: Take 20 mg by mouth every evening ) 90 tablet 3     fluticasone-salmeterol (ADVAIR DISKUS) 250-50 MCG/DOSE diskus inhaler Inhale 1 puff into the lungs 2 times daily 60 Inhaler 11     lisinopril (PRINIVIL/ZESTRIL) 40 MG tablet Take 1 tablet (40 mg) by mouth daily (Patient taking differently: Take 40 mg by mouth every evening ) 90 tablet 3     memantine (NAMENDA) 10 MG tablet Take 1 tablet (10 mg) by mouth 2 times daily 180 tablet 3     metoprolol succinate ER (TOPROL-XL) 100 MG 24 hr tablet Take 1 tablet (100 mg) by mouth daily 60 tablet 0     Multiple Vitamins-Minerals (MULTIVITAMIN ADULT PO) Take 1 tablet by mouth every evening        nitroGLYcerin (NITROSTAT) 0.4 MG sublingual tablet For chest pain place 1 tablet under the tongue every 5 minutes for 3 doses. If symptoms persist 5 minutes after 1st dose call 911. 25 tablet 0     order for DME Equipment being ordered: Portable oxygen concentrator with O2 @ 2L NC PRN shortnes of  "breath. 1 each 0     order for DME Equipment being ordered: shower chair 1 each 0     rivaroxaban ANTICOAGULANT (XARELTO) 20 MG TABS tablet Take 1 tablet (20 mg) by mouth daily (with dinner) 90 tablet 3     simvastatin (ZOCOR) 10 MG tablet Take 1 tablet (10 mg) by mouth At Bedtime 93 tablet 3     Thiamine HCl (VITAMIN B-1 PO) Take 250 mg by mouth 2 times daily        tiotropium (SPIRIVA HANDIHALER) 18 MCG capsule Inhale 1 capsule (18 mcg) into the lungs daily Inhale contents of one capsule daily. 90 capsule 3       ALLERGIES:   No Known Allergies    Problem list, Medication list, Allergies, and Medical/Social/Surgical histories reviewed in [facility] and updated as appropriate.    OBJECTIVE:                                                    VITALS: /70 (BP Location: Right arm, Cuff Size: Adult Regular)   Pulse 63   Temp 98.1  F (36.7  C) (Tympanic)   Resp 24   Ht 1.695 m (5' 6.75\")   Wt 70.8 kg (156 lb)   SpO2 98%   BMI 24.62 kg/m   Body mass index is 24.62 kg/m .  GENERAL: Pleasant, well appearing male.  HEENT: PERRL, EOMI, oropharynx clear.  NECK: supple, no thyromegaly or thyroid masses, no lymphadenopathy.  CV: RRR, no murmurs, rubs or gallops.  LUNGS: scattered rhonchi, no rales, normal effort.  ABDOMEN: Soft, non-distended, non-tender.  No hepatosplenomegaly or palpable masses.      ASSESSMENT/PLAN:                                                    1. Chronic obstructive pulmonary disease, unspecified COPD type (H)  Breathing improved. Continue to monitor.    2. Paroxysmal atrial fibrillation (H)  New diagnosis at previous hospitalization.  On Xarelto. Stable.    3. PVD (peripheral vascular disease) (H)  Stable. Was on plavix but this was discontinued when Xarelto was started.       "

## 2019-01-25 NOTE — PATIENT INSTRUCTIONS
Our Clinic hours are:  Mondays    7:20 am - 7 pm  Tues -  Fri  7:20 am - 5 pm    Clinic Phone: [phone]    The clinic lab opens at 7:30 am Mon - Fri and appointments are required.    [facility]. [phone]  Monday  8 am - 7pm  Tues - Fri 8 am - 5:30 pm

## 2019-02-13 ENCOUNTER — PATIENT OUTREACH (OUTPATIENT)
Dept: CARE COORDINATION | Facility: CLINIC | Age: 74
End: 2019-02-13

## 2019-02-13 ASSESSMENT — ACTIVITIES OF DAILY LIVING (ADL): DEPENDENT_IADLS:: INDEPENDENT

## 2019-02-13 NOTE — PROGRESS NOTES
Clinic Care Coordination Contact  [facility]/Voicemail    Referral Source: IP Handoff  Clinical Data: Care Coordinator Outreach  Outreach attempted x 2.  Left message on voicemail with call back information and requested return call.  Plan: Care Coordinator mailed out care coordination introduction letter on 12-21. Care Coordinator will do no further outreaches at this time.  Juve Gordon RN  Clinic Care Coordinator  [phone] or [phone]

## 2019-03-12 ENCOUNTER — PATIENT OUTREACH (OUTPATIENT)
Dept: CARE COORDINATION | Facility: CLINIC | Age: 74
End: 2019-03-12

## 2019-03-12 ENCOUNTER — TELEPHONE (OUTPATIENT)
Dept: FAMILY MEDICINE | Facility: CLINIC | Age: 74
End: 2019-03-12

## 2019-03-12 DIAGNOSIS — I48.0 PAROXYSMAL ATRIAL FIBRILLATION (H): Primary | ICD-10-CM

## 2019-03-12 DIAGNOSIS — J44.9 CHRONIC OBSTRUCTIVE PULMONARY DISEASE, UNSPECIFIED COPD TYPE (H): ICD-10-CM

## 2019-03-12 DIAGNOSIS — J44.1 COPD EXACERBATION (H): ICD-10-CM

## 2019-03-12 DIAGNOSIS — F03.90 DEMENTIA WITHOUT BEHAVIORAL DISTURBANCE, UNSPECIFIED DEMENTIA TYPE: ICD-10-CM

## 2019-03-12 DIAGNOSIS — J44.9 CHRONIC OBSTRUCTIVE PULMONARY DISEASE, UNSPECIFIED COPD TYPE (H): Primary | ICD-10-CM

## 2019-03-12 ASSESSMENT — ACTIVITIES OF DAILY LIVING (ADL): DEPENDENT_IADLS:: INDEPENDENT

## 2019-03-12 NOTE — TELEPHONE ENCOUNTER
S-(situation): Spouse called and is concerned about the patient.  He is not wanting to get up, sleeping all the time and has not got up.    B-(background): Spouse believes he is dying.     A-(assessment): spouse reports the patient has dementia and a fib that they are not treating. Spouse reports she would like home care orders for an RN to come and help take care of him.  Patient has been extreme fatigue. She has trouble waking him up.  Patient has not urinated in 12 hours.  Spouse has no way of getting him to the ER.     R-(recommendations): Advised patient will contact the covering provider for home care orders to help with cares.  Spouse was advised she call call 911 for assistance.  Please review and advise.  Home care orders are pended.  Patient was seen by PCP on 1/25/19.    Thank you    Lavonne BUSBY RN

## 2019-03-12 NOTE — TELEPHONE ENCOUNTER
Reason for Call:  Other     Detailed comments: Pt 's Wife is looking for a home care assessment for Chadwick.  She feels she needs help with care.  He has COPD, Dementia, sleeping too much has a history of Heart Disease etc.  Sleeping most the weekend, and would like help for his care.    Phone Number Patient can be reached at: Home number on file [phone] (home)    Best Time: any    Can we leave a detailed message on this number? YES    Call taken on 3/12/2019 at 8:12 AM by Joselyn Vigil

## 2019-03-12 NOTE — PROGRESS NOTES
Clinic Care Coordination Contact    Called and spoke with Zuri salazar a nurse is coming out tomorrow at 9am.  pt is mad but will hopefully come around.      Plan:  RN CC will close to Care Coordination.     Itzel TATUM, RN, PHN  Care Coordination    [facility]  [address] [place] [address]  Office: [phone]  Fax [phone]   [facility]  [address] [place] [address]  Office: [phone] Fax [phone]  [email or web address]   [email or web address]   Connect with [facility] on social media.

## 2019-03-12 NOTE — TELEPHONE ENCOUNTER
Spouse was notified of the order placed for home care.  Writer discussed with the CC and she will contact Home care to see if they can go there today.    Lavonne BUSBY RN

## 2019-03-12 NOTE — PROGRESS NOTES
Home care is available for a few days,  Can we do hospice order?  Please see note from CC below.  Order pended.  Thank you    Lavonne BUSBY RN

## 2019-03-12 NOTE — PROGRESS NOTES
Clinic Care Coordination Contact  Care Team Conversations    Called and spoke with Becky Kaur power of .  Discussed Hospice and she agreered this was a good idea.  She would like to have the hospice nurse call and talk to her after the appt. Message sent to home care to contact Zuri wife for time and then to contact Lavonne after admission.     Itzel TATUM, RN, PHN  Care Coordination    [facility]  [address] [place] [address]  Office: [phone]  Fax [phone]   [facility]  [address] [place] [address]  Office: [phone] Fax [phone]  [email or web address]   [email or web address]   Connect with [facility] on social media.

## 2019-03-13 ENCOUNTER — AMBULATORY - HEALTHEAST (OUTPATIENT)
Dept: OTHER | Facility: CLINIC | Age: 74
End: 2019-03-13

## 2019-03-13 ENCOUNTER — DOCUMENTATION ONLY (OUTPATIENT)
Dept: OTHER | Facility: CLINIC | Age: 74
End: 2019-03-13

## 2019-03-13 PROBLEM — Z71.89 ADVANCED DIRECTIVES, COUNSELING/DISCUSSION: Status: RESOLVED | Noted: 2017-03-17 | Resolved: 2019-03-13

## 2019-03-14 ENCOUNTER — TELEPHONE (OUTPATIENT)
Dept: FAMILY MEDICINE | Facility: CLINIC | Age: 74
End: 2019-03-14

## 2019-03-14 NOTE — TELEPHONE ENCOUNTER
Reason for Call:  Other Notification     Detailed comments: Pt started Hospice care 3/13, no need to return call unless there are questions    Phone Number Patient can be reached at: Other phone number:  [phone] Amelia Ferrari*    Best Time: any    Can we leave a detailed message on this number? YES    Call taken on 3/14/2019 at 10:00 AM by Gypsy Mo

## 2019-03-20 ENCOUNTER — MEDICAL CORRESPONDENCE (OUTPATIENT)
Dept: HEALTH INFORMATION MANAGEMENT | Facility: CLINIC | Age: 74
End: 2019-03-20

## 2019-03-27 ENCOUNTER — MEDICAL CORRESPONDENCE (OUTPATIENT)
Dept: HEALTH INFORMATION MANAGEMENT | Facility: CLINIC | Age: 74
End: 2019-03-27

## 2019-03-27 ENCOUNTER — TELEPHONE (OUTPATIENT)
Dept: FAMILY MEDICINE | Facility: CLINIC | Age: 74
End: 2019-03-27

## 2019-03-27 NOTE — TELEPHONE ENCOUNTER
Wife is reporting that hospice staff are going to switch him over to neb with face mask and discontinue inhalers. She is concerned they are taking him off of heart meds and namenda. She says hospice nurse is there now. I asked if they explained what hospice means , and it is typical that they start backing off off meds. She has had the kip out and will get once a week nurse visit and once a week nursing assistant visit. I think she is just looking for a verbal ok from you that it is ok to follow orders from hospice staff.She understands this means end of life ,yet she stated she'd like to keep him around as long as possible. Please respond if you feel strongly about continuing meds other than celexa. Susan Grossman RN

## 2019-03-27 NOTE — TELEPHONE ENCOUNTER
Reason for call:  Patient reporting a symptom    Symptom or request: Pt's spouse Zuri calling.  She states that pt just signed in to hospice and she has questions for Dr. Hills regarding med changes.  Please call Zuri and advise.      Duration (how long have symptoms been present): ongoing    Have you been treated for this before? No    Additional comments:     Phone Number patient's spouse can be reached at:  Home number on file [phone] (home)    Best Time:  any    Can we leave a detailed message on this number:  YES    Call taken on 3/27/2019 at 11:09 AM by Lucy Moreira

## 2019-03-27 NOTE — TELEPHONE ENCOUNTER
I spoke with wife Taya and she was very confused about his medications. I called Homecare and spoke with NAZIA Taylor for Chadwick. She will call Taya and their granddaughters who are helping out and get it all straightened out.    Johanna Salazar RN

## 2019-03-27 NOTE — TELEPHONE ENCOUNTER
Hospice plan sounds like a very reasonable plan. I am in agreement with the changes in medication that they are making.  Ultimately the decision to continue with hospice rests with the patient and family but focus becomes comfort cares rather than disease treatment focused.  We do typically stop all non-comfort medications when people enrol in hospice.

## 2019-04-10 ENCOUNTER — MEDICAL CORRESPONDENCE (OUTPATIENT)
Dept: HEALTH INFORMATION MANAGEMENT | Facility: CLINIC | Age: 74
End: 2019-04-10

## 2019-04-24 ENCOUNTER — MEDICAL CORRESPONDENCE (OUTPATIENT)
Dept: HEALTH INFORMATION MANAGEMENT | Facility: CLINIC | Age: 74
End: 2019-04-24

## 2019-04-24 ENCOUNTER — TELEPHONE (OUTPATIENT)
Dept: FAMILY MEDICINE | Facility: CLINIC | Age: 74
End: 2019-04-24

## 2019-04-24 NOTE — TELEPHONE ENCOUNTER
Reason for Call: Request for an order or referral:    Order or referral being requested: Order for Álvaroare signed and faxed back - Copy to scanning     Call taken on 4/24/2019 at 7:57 AM by Lucy Moreira

## 2019-06-06 NOTE — TELEPHONE ENCOUNTER
"S-(situation): Received call from Dr. Smith at [facility] (Office [phone]).    B-(background): Pt is at there today [facility], and to also be co-managed by Dr. Jessi Hills.    A-(assessment):   Per Dr. Smith, metoprolol succinate may continue to be prescribed if there is a dx of heart failure. There is no dx of this on this pt's Problem list. Has paroxysmal A-Fib and benign essential hypertension on Problem List. The associated dx with metoprolol succinate ER is paroxysmal atrial fibrillation. This was last ordered by Dr. Noah Frey.    Since there is no dx of heart failure he will need to be changed to metoprolol tartrate; she requests that Dr. Hills consider this.    Dr. Smith states the wife reports pt was placed on Xarelto for a \"shunt that he has in the groin.\" Dr. Peters ordered Xarelto for 'Paroxysmal atrial fibrillation'  Dr. Peters's Office Visit note from 12/31/18:  'Medication changes since discharge: He was started on Xarelto in the hospital because of the atrial fibrillation, and wife was not clear whether he should be taken off of the clopridogel (this was started 2 years ago when he had a stent placed in his leg for peripheral vascular disease)'    'To clarify your medications, the clopridogel has been discontinued. You have been put on Xarelto to reduce the risk of stroke. Also, continue the aspirin 81 mg daily.'      Per Dr. Smith's request, writer [facility]ed [facility] @ (Fax #): [phone]    Adjustments to the Xarelto can only be managed by the [facility]; cannot be changed by anyone other than the [facility] if the pt chooses to go this route.    Dr. Smith will be faxing information over to Dr. Hills today from her visit with pt.    Amelia CORTEZ RN      "

## 2019-06-06 NOTE — TELEPHONE ENCOUNTER
I think if the [facility] is requesting specific medications to be prescribed then he is probably best served by having these medications managed through the [facility].

## 2020-01-01 ENCOUNTER — MEDICAL CORRESPONDENCE (OUTPATIENT)
Dept: HEALTH INFORMATION MANAGEMENT | Facility: CLINIC | Age: 75
End: 2020-01-01

## 2020-01-01 ENCOUNTER — NURSING HOME VISIT (OUTPATIENT)
Dept: GERIATRICS | Facility: CLINIC | Age: 75
End: 2020-01-01
Payer: MEDICARE

## 2020-01-01 ENCOUNTER — VIRTUAL VISIT (OUTPATIENT)
Dept: GERIATRICS | Facility: CLINIC | Age: 75
End: 2020-01-01
Payer: COMMERCIAL

## 2020-01-01 VITALS
DIASTOLIC BLOOD PRESSURE: 71 MMHG | SYSTOLIC BLOOD PRESSURE: 152 MMHG | RESPIRATION RATE: 18 BRPM | OXYGEN SATURATION: 93 % | HEART RATE: 98 BPM | TEMPERATURE: 97.2 F

## 2020-01-01 VITALS
OXYGEN SATURATION: 96 % | TEMPERATURE: 97.4 F | RESPIRATION RATE: 20 BRPM | HEIGHT: 66 IN | WEIGHT: 129.7 LBS | DIASTOLIC BLOOD PRESSURE: 75 MMHG | SYSTOLIC BLOOD PRESSURE: 125 MMHG | HEART RATE: 78 BPM | BODY MASS INDEX: 20.84 KG/M2

## 2020-01-01 VITALS
SYSTOLIC BLOOD PRESSURE: 156 MMHG | WEIGHT: 132.6 LBS | DIASTOLIC BLOOD PRESSURE: 75 MMHG | HEART RATE: 80 BPM | BODY MASS INDEX: 21.31 KG/M2 | OXYGEN SATURATION: 93 % | HEIGHT: 66 IN | TEMPERATURE: 98.4 F | RESPIRATION RATE: 18 BRPM

## 2020-01-01 VITALS
DIASTOLIC BLOOD PRESSURE: 75 MMHG | WEIGHT: 129.7 LBS | BODY MASS INDEX: 20.84 KG/M2 | TEMPERATURE: 97.4 F | RESPIRATION RATE: 20 BRPM | SYSTOLIC BLOOD PRESSURE: 125 MMHG | HEIGHT: 66 IN | OXYGEN SATURATION: 90 % | HEART RATE: 78 BPM

## 2020-01-01 VITALS
DIASTOLIC BLOOD PRESSURE: 52 MMHG | TEMPERATURE: 99 F | WEIGHT: 126.6 LBS | HEIGHT: 66 IN | SYSTOLIC BLOOD PRESSURE: 102 MMHG | BODY MASS INDEX: 20.34 KG/M2 | RESPIRATION RATE: 18 BRPM | OXYGEN SATURATION: 88 % | HEART RATE: 78 BPM

## 2020-01-01 VITALS
WEIGHT: 125.7 LBS | OXYGEN SATURATION: 91 % | SYSTOLIC BLOOD PRESSURE: 110 MMHG | TEMPERATURE: 98.1 F | HEART RATE: 97 BPM | RESPIRATION RATE: 16 BRPM | DIASTOLIC BLOOD PRESSURE: 67 MMHG | BODY MASS INDEX: 20.29 KG/M2

## 2020-01-01 DIAGNOSIS — I48.0 PAROXYSMAL ATRIAL FIBRILLATION (H): ICD-10-CM

## 2020-01-01 DIAGNOSIS — J44.9 CHRONIC OBSTRUCTIVE PULMONARY DISEASE, UNSPECIFIED COPD TYPE (H): Primary | ICD-10-CM

## 2020-01-01 DIAGNOSIS — F02.818 LATE ONSET ALZHEIMER'S DISEASE WITH BEHAVIORAL DISTURBANCE (H): Primary | ICD-10-CM

## 2020-01-01 DIAGNOSIS — I73.9 PVD (PERIPHERAL VASCULAR DISEASE) (H): ICD-10-CM

## 2020-01-01 DIAGNOSIS — G30.1 LATE ONSET ALZHEIMER'S DISEASE WITHOUT BEHAVIORAL DISTURBANCE (H): Primary | ICD-10-CM

## 2020-01-01 DIAGNOSIS — I10 ESSENTIAL HYPERTENSION, BENIGN: ICD-10-CM

## 2020-01-01 DIAGNOSIS — F03.90 DEMENTIA WITHOUT BEHAVIORAL DISTURBANCE, UNSPECIFIED DEMENTIA TYPE: ICD-10-CM

## 2020-01-01 DIAGNOSIS — G30.1 LATE ONSET ALZHEIMER'S DISEASE WITH BEHAVIORAL DISTURBANCE (H): Primary | ICD-10-CM

## 2020-01-01 DIAGNOSIS — Z51.5 HOSPICE CARE PATIENT: ICD-10-CM

## 2020-01-01 DIAGNOSIS — G30.1 LATE ONSET ALZHEIMER'S DISEASE WITH BEHAVIORAL DISTURBANCE (H): ICD-10-CM

## 2020-01-01 DIAGNOSIS — F02.818 LATE ONSET ALZHEIMER'S DISEASE WITH BEHAVIORAL DISTURBANCE (H): ICD-10-CM

## 2020-01-01 DIAGNOSIS — R46.89 AGGRESSIVE BEHAVIOR: ICD-10-CM

## 2020-01-01 DIAGNOSIS — J44.9 CHRONIC OBSTRUCTIVE PULMONARY DISEASE, UNSPECIFIED COPD TYPE (H): ICD-10-CM

## 2020-01-01 DIAGNOSIS — Z72.89 INAPPROPRIATE SEXUAL BEHAVIOR: ICD-10-CM

## 2020-01-01 DIAGNOSIS — F02.80 LATE ONSET ALZHEIMER'S DISEASE WITHOUT BEHAVIORAL DISTURBANCE (H): ICD-10-CM

## 2020-01-01 DIAGNOSIS — I70.90 ARTERIOSCLEROTIC VASCULAR DISEASE: ICD-10-CM

## 2020-01-01 DIAGNOSIS — G30.1 LATE ONSET ALZHEIMER'S DISEASE WITHOUT BEHAVIORAL DISTURBANCE (H): ICD-10-CM

## 2020-01-01 DIAGNOSIS — Z72.0 TOBACCO ABUSE: ICD-10-CM

## 2020-01-01 DIAGNOSIS — I73.9 CLAUDICATION (H): ICD-10-CM

## 2020-01-01 DIAGNOSIS — J96.11 CHRONIC RESPIRATORY FAILURE WITH HYPOXIA (H): ICD-10-CM

## 2020-01-01 DIAGNOSIS — E78.5 HYPERLIPIDEMIA LDL GOAL <70: ICD-10-CM

## 2020-01-01 DIAGNOSIS — E78.5 HYPERLIPIDEMIA, UNSPECIFIED HYPERLIPIDEMIA TYPE: ICD-10-CM

## 2020-01-01 DIAGNOSIS — F02.80 LATE ONSET ALZHEIMER'S DISEASE WITHOUT BEHAVIORAL DISTURBANCE (H): Primary | ICD-10-CM

## 2020-01-01 DIAGNOSIS — J44.1 COPD EXACERBATION (H): ICD-10-CM

## 2020-01-01 PROCEDURE — 99309 SBSQ NF CARE MODERATE MDM 30: CPT | Mod: 95 | Performed by: FAMILY MEDICINE

## 2020-01-01 PROCEDURE — 99309 SBSQ NF CARE MODERATE MDM 30: CPT | Mod: GW | Performed by: NURSE PRACTITIONER

## 2020-01-01 PROCEDURE — 99308 SBSQ NF CARE LOW MDM 20: CPT | Mod: GV | Performed by: NURSE PRACTITIONER

## 2020-01-01 PROCEDURE — 99305 1ST NF CARE MODERATE MDM 35: CPT | Mod: GV | Performed by: FAMILY MEDICINE

## 2020-01-01 RX ORDER — DIVALPROEX SODIUM 125 MG/1
125 TABLET, DELAYED RELEASE ORAL 2 TIMES DAILY
COMMUNITY

## 2020-01-01 RX ORDER — HALOPERIDOL 2 MG/ML
1 SOLUTION ORAL 2 TIMES DAILY
COMMUNITY
Start: 2020-01-01

## 2020-01-01 RX ORDER — IPRATROPIUM BROMIDE AND ALBUTEROL SULFATE 2.5; .5 MG/3ML; MG/3ML
1 SOLUTION RESPIRATORY (INHALATION) EVERY 4 HOURS PRN
COMMUNITY

## 2020-01-01 RX ORDER — BISACODYL 10 MG
10 SUPPOSITORY, RECTAL RECTAL DAILY PRN
COMMUNITY

## 2020-01-01 RX ORDER — GUAIFENESIN 600 MG/1
600 TABLET, EXTENDED RELEASE ORAL 2 TIMES DAILY
COMMUNITY

## 2020-01-01 RX ORDER — SENNOSIDES A AND B 8.6 MG/1
1 TABLET, FILM COATED ORAL DAILY
COMMUNITY

## 2020-01-01 RX ORDER — MORPHINE SULFATE 20 MG/ML
2.5 SOLUTION ORAL 3 TIMES DAILY
COMMUNITY

## 2020-01-01 RX ORDER — ACETAMINOPHEN 120 MG/1
15 SUPPOSITORY RECTAL EVERY 4 HOURS PRN
COMMUNITY

## 2020-01-01 ASSESSMENT — MIFFLIN-ST. JEOR
SCORE: 1261.07
SCORE: 1242
SCORE: 1261.07
SCORE: 1274.22

## 2020-01-13 NOTE — PROGRESS NOTES
[facility]  PRIMARY CARE PROVIDER AND CLINIC:  Jessi Hills MD, [address] [facility] [id number]  Chief Complaint   Patient presents with     Establish Care     [facility] Medical Record Number:  [id number]  Place of Service where encounter took place:  [facility] [[id number]]    Chadwick Aguilar  is a 74 year old patient with a history of COPD, tobacco abuse, peripheral vascular disease status post iliac and femoral stents, hypertension, hyperlipidemia and dementia.  Patient was recently hospitalized at [facility] for respiratory failure January 2018.  Patient was admitted to hospice March 2019 with a COPD diagnosis.    Patient admitted to long-term care facility due to increasing care needs.  No nursing concerns reported since admission.  Met with patient in his room today.  He appears in no distress.  His breathing is nonlabored.  He is receiving oxygen via nasal cannula.  He denies pain today.  He is alert to self only.    CODE STATUS/ADVANCE DIRECTIVES DISCUSSION:   DNR / DNI  Patient's living condition: lives with spouse  ALLERGIES: Patient has no known allergies.  PAST MEDICAL HISTORY:  has a past medical history of COPD (chronic obstructive pulmonary disease) (H) and Peripheral vascular disease (H).  PAST SURGICAL HISTORY:   has a past surgical history that includes tonsillectomy & adenoidectomy; colonoscopy (2012); REVISE THUMB TENDON (Right); BALLN ANGIOPLASTY OPEN,ILIAC (2016); and Percutaneous Coronary Intervention Angioplasty (12/2016).  FAMILY HISTORY: family history includes No Known Problems in his father and mother.  SOCIAL HISTORY:   reports that he has been smoking cigarettes. He has smoked for the past 50.00 years. He has never used smokeless tobacco. He reports current alcohol use. He reports that he does not use drugs.    Post Discharge Medication Reconciliation Status: discharge medications reconciled, continue medications without  change    Current Outpatient Medications   Medication Sig Dispense Refill     acetaminophen (TYLENOL) 120 MG suppository Place 15 mg/kg rectally every 4 hours as needed for fever       aspirin 81 MG chewable tablet Take 1 tablet (81 mg) by mouth daily 108 tablet 3     atropine 1 % SOLN Place 2 drops under the tongue every 2 hours as needed for secretions       bisacodyl (DULCOLAX) 10 MG suppository Place 10 mg rectally daily as needed for constipation       citalopram (CELEXA) 20 MG tablet Take 1 tablet (20 mg) by mouth daily 90 tablet 3     guaiFENesin (MUCINEX) 600 MG 12 hr tablet Take 1,200 mg by mouth 2 times daily       haloperidol (HALDOL) 2 MG/ML (HIGH CONC) solution Take 1 mg by mouth 2 times daily Also take 2 mg by mouth 1 time daily and also take 1 mg by mouth every 6 hours as needed       ipratropium - albuterol 0.5 mg/2.5 mg/3 mL (DUONEB) 0.5-2.5 (3) MG/3ML neb solution Take 1 vial by nebulization every 4 hours as needed for shortness of breath / dyspnea or wheezing       lisinopril (PRINIVIL/ZESTRIL) 40 MG tablet Take 1 tablet (40 mg) by mouth daily 90 tablet 3     morphine sulfate HIGH CONCENTRATE (ROXANOL) 20 mg/mL (HIGH CONC) solution Take 2.5 mg by mouth 3 times daily And also take 2.5 mg by mouth every 2 hours as needed       nitroGLYcerin (NITROSTAT) 0.4 MG sublingual tablet For chest pain place 1 tablet under the tongue every 5 minutes for 3 doses. If symptoms persist 5 minutes after 1st dose call 911. 25 tablet 0     order for DME Equipment being ordered: Portable oxygen concentrator with O2 @ 2L NC PRN shortnes of breath. 1 each 0     order for DME Equipment being ordered: shower chair 1 each 0     senna (SENOKOT) 8.6 MG tablet Take 1 tablet by mouth daily And also 1 tablet by mouth as needed       albuterol (2.5 MG/3ML) 0.083% neb solution Take 1 vial (2.5 mg) by nebulization every 6 hours as needed for shortness of breath / dyspnea or wheezing 25 vial 11     albuterol (PROVENTIL) (2.5 MG/3ML)  0.083% neb solution Take 1 vial (2.5 mg) by nebulization every 6 hours as needed for shortness of breath / dyspnea or wheezing 120 vial 3     fluticasone-salmeterol (ADVAIR DISKUS) 250-50 MCG/DOSE diskus inhaler Inhale 1 puff into the lungs 2 times daily 60 Inhaler 11     memantine (NAMENDA) 10 MG tablet Take 1 tablet (10 mg) by mouth 2 times daily 180 tablet 3     metoprolol succinate ER (TOPROL-XL) 100 MG 24 hr tablet Take 1 tablet (100 mg) by mouth daily 60 tablet 0     Multiple Vitamins-Minerals (MULTIVITAMIN ADULT PO) Take 1 tablet by mouth every evening        rivaroxaban ANTICOAGULANT (XARELTO) 20 MG TABS tablet Take 1 tablet (20 mg) by mouth daily (with dinner) 90 tablet 3     simvastatin (ZOCOR) 10 MG tablet Take 1 tablet (10 mg) by mouth At Bedtime 93 tablet 3     Thiamine HCl (VITAMIN B-1 PO) Take 250 mg by mouth 2 times daily        tiotropium (SPIRIVA HANDIHALER) 18 MCG capsule Inhale 1 capsule (18 mcg) into the lungs daily Inhale contents of one capsule daily. 90 capsule 3       ROS:  Limited secondary to cognitive impairment but today pt reports denies pain    Vitals:  BP (!) 152/71   Pulse 98   Temp 97.2  F (36.2  C)   Resp 18   SpO2 93%   Exam:  GENERAL APPEARANCE:  in no distress, thin, well groomed  RESP:  no respiratory distress, diminished breath sounds t/o  CV:  no edema, irregular rhythm reg rate  ABDOMEN:  no guarding or rebound, bowel sounds normal  SKIN:  pale, ecchymotic areas on arms  PSYCH:  insight and judgement impaired, memory impaired , affect and mood normal    Lab/Diagnostic data:  not follwoing routine labs, pt is on hospice    ASSESSMENT/PLAN:  COPD (chronic obstructive pulmonary disease) (H)  -History of tobacco abuse, no longer smoking  -Oxygen dependent with history of frequent exacerbations  -Started on hospice March 2019, goal is comfort  -Continue PRN duo nebs and morphine if respiratory distress      Essential hypertension, benign  Paroxysmal atrial fibrillation  (H)  Hyperlipidemia LDL goal <70  -Vitals reviewed: -150, heart rate .  No longer on oral anticoagulants.  Continue lisinopril, consider dose reduction if hypotensive  -Not following routine labs, goal is comfort    PVD (peripheral vascular disease) (H)  Claudication (H)  -Status post femoral and iliac stenting  -Plavix and statin have been stopped as goal is comfort    Dementia without behavioral disturbance, unspecified dementia type (H)  -No mood or behavioral concerns reported patient is on scheduled and PRN Haldol, will need 14-day follow-up face-to-face of PRN Haldol continues    Hospice care patient- copd- [place]  -Not following routine labs, goal is comfort  -Staff to update if any concerns       transcribed by : Chary Geller  Orders:  1. Stop date for RN On[place] 1/27/2020. Update NP if continued US, pt will need a F2F visit      Electronically signed by:  BERNADINE Mcdonald CNP

## 2020-01-14 NOTE — LETTER
1/14/2020        RE: Chadwick Aguilar  [address] [place] [address]        [facility]  PRIMARY CARE PROVIDER AND CLINIC:  Jessi Hills MD, [address] [facility] [address]  Chief Complaint   Patient presents with     Establish Care     [facility] Medical Record Number:  [id number]  Place of Service where encounter took place:  [facility] [[id number]]    Chadwick Aguilar  is a 74 year old patient with a history of COPD, tobacco abuse, peripheral vascular disease status post iliac and femoral stents, hypertension, hyperlipidemia and dementia.  Patient was recently hospitalized at [facility] for respiratory failure January 2018.  Patient was admitted to hospice March 2019 with a COPD diagnosis.    Patient admitted to long-term care facility due to increasing care needs.  No nursing concerns reported since admission.  Met with patient in his room today.  He appears in no distress.  His breathing is nonlabored.  He is receiving oxygen via nasal cannula.  He denies pain today.  He is alert to self only.    CODE STATUS/ADVANCE DIRECTIVES DISCUSSION:   DNR / DNI  Patient's living condition: lives with spouse  ALLERGIES: Patient has no known allergies.  PAST MEDICAL HISTORY:  has a past medical history of COPD (chronic obstructive pulmonary disease) (H) and Peripheral vascular disease (H).  PAST SURGICAL HISTORY:   has a past surgical history that includes tonsillectomy & adenoidectomy; colonoscopy (2012); REVISE THUMB TENDON (Right); BALLN ANGIOPLASTY OPEN,ILIAC (2016); and Percutaneous Coronary Intervention Angioplasty (12/2016).  FAMILY HISTORY: family history includes No Known Problems in his father and mother.  SOCIAL HISTORY:   reports that he has been smoking cigarettes. He has smoked for the past 50.00 years. He has never used smokeless tobacco. He reports current alcohol use. He reports that he does not use drugs.    Post Discharge  Medication Reconciliation Status: discharge medications reconciled, continue medications without change    Current Outpatient Medications   Medication Sig Dispense Refill     acetaminophen (TYLENOL) 120 MG suppository Place 15 mg/kg rectally every 4 hours as needed for fever       aspirin 81 MG chewable tablet Take 1 tablet (81 mg) by mouth daily 108 tablet 3     atropine 1 % SOLN Place 2 drops under the tongue every 2 hours as needed for secretions       bisacodyl (DULCOLAX) 10 MG suppository Place 10 mg rectally daily as needed for constipation       citalopram (CELEXA) 20 MG tablet Take 1 tablet (20 mg) by mouth daily 90 tablet 3     guaiFENesin (MUCINEX) 600 MG 12 hr tablet Take 1,200 mg by mouth 2 times daily       haloperidol (HALDOL) 2 MG/ML (HIGH CONC) solution Take 1 mg by mouth 2 times daily Also take 2 mg by mouth 1 time daily and also take 1 mg by mouth every 6 hours as needed       ipratropium - albuterol 0.5 mg/2.5 mg/3 mL (DUONEB) 0.5-2.5 (3) MG/3ML neb solution Take 1 vial by nebulization every 4 hours as needed for shortness of breath / dyspnea or wheezing       lisinopril (PRINIVIL/ZESTRIL) 40 MG tablet Take 1 tablet (40 mg) by mouth daily 90 tablet 3     morphine sulfate HIGH CONCENTRATE (ROXANOL) 20 mg/mL (HIGH CONC) solution Take 2.5 mg by mouth 3 times daily And also take 2.5 mg by mouth every 2 hours as needed       nitroGLYcerin (NITROSTAT) 0.4 MG sublingual tablet For chest pain place 1 tablet under the tongue every 5 minutes for 3 doses. If symptoms persist 5 minutes after 1st dose call 911. 25 tablet 0     order for DME Equipment being ordered: Portable oxygen concentrator with O2 @ 2L NC PRN shortnes of breath. 1 each 0     order for DME Equipment being ordered: shower chair 1 each 0     senna (SENOKOT) 8.6 MG tablet Take 1 tablet by mouth daily And also 1 tablet by mouth as needed       albuterol (2.5 MG/3ML) 0.083% neb solution Take 1 vial (2.5 mg) by nebulization every 6 hours as  needed for shortness of breath / dyspnea or wheezing 25 vial 11     albuterol (PROVENTIL) (2.5 MG/3ML) 0.083% neb solution Take 1 vial (2.5 mg) by nebulization every 6 hours as needed for shortness of breath / dyspnea or wheezing 120 vial 3     fluticasone-salmeterol (ADVAIR DISKUS) 250-50 MCG/DOSE diskus inhaler Inhale 1 puff into the lungs 2 times daily 60 Inhaler 11     memantine (NAMENDA) 10 MG tablet Take 1 tablet (10 mg) by mouth 2 times daily 180 tablet 3     metoprolol succinate ER (TOPROL-XL) 100 MG 24 hr tablet Take 1 tablet (100 mg) by mouth daily 60 tablet 0     Multiple Vitamins-Minerals (MULTIVITAMIN ADULT PO) Take 1 tablet by mouth every evening        rivaroxaban ANTICOAGULANT (XARELTO) 20 MG TABS tablet Take 1 tablet (20 mg) by mouth daily (with dinner) 90 tablet 3     simvastatin (ZOCOR) 10 MG tablet Take 1 tablet (10 mg) by mouth At Bedtime 93 tablet 3     Thiamine HCl (VITAMIN B-1 PO) Take 250 mg by mouth 2 times daily        tiotropium (SPIRIVA HANDIHALER) 18 MCG capsule Inhale 1 capsule (18 mcg) into the lungs daily Inhale contents of one capsule daily. 90 capsule 3       ROS:  Limited secondary to cognitive impairment but today pt reports denies pain    Vitals:  BP (!) 152/71   Pulse 98   Temp 97.2  F (36.2  C)   Resp 18   SpO2 93%   Exam:  GENERAL APPEARANCE:  in no distress, thin, well groomed  RESP:  no respiratory distress, diminished breath sounds t/o  CV:  no edema, irregular rhythm reg rate  ABDOMEN:  no guarding or rebound, bowel sounds normal  SKIN:  pale, ecchymotic areas on arms  PSYCH:  insight and judgement impaired, memory impaired , affect and mood normal    Lab/Diagnostic data:  not follwoing routine labs, pt is on hospice    ASSESSMENT/PLAN:  COPD (chronic obstructive pulmonary disease) (H)  -History of tobacco abuse, no longer smoking  -Oxygen dependent with history of frequent exacerbations  -Started on hospice March 2019, goal is comfort  -Continue PRN duo nebs and  morphine if respiratory distress      Essential hypertension, benign  Paroxysmal atrial fibrillation (H)  Hyperlipidemia LDL goal <70  -Vitals reviewed: -150, heart rate .  No longer on oral anticoagulants.  Continue lisinopril, consider dose reduction if hypotensive  -Not following routine labs, goal is comfort    PVD (peripheral vascular disease) (H)  Claudication (H)  -Status post femoral and iliac stenting  -Plavix and statin have been stopped as goal is comfort    Dementia without behavioral disturbance, unspecified dementia type (H)  -No mood or behavioral concerns reported patient is on scheduled and PRN Haldol, will need 14-day follow-up face-to-face of PRN Haldol continues    Hospice care patient- copd- [place]  -Not following routine labs, goal is comfort  -Staff to update if any concerns       transcribed by : Chary Geller  Orders:  1. Stop date for RN [place] 1/27/2020. Update NP if continued US, pt will need a F2F visit      Electronically signed by:  BERNADINE Mcdonald CNP                       Sincerely,        BERNADINE Mcdonald CNP

## 2020-01-30 PROBLEM — J96.11 CHRONIC RESPIRATORY FAILURE WITH HYPOXIA (H): Status: ACTIVE | Noted: 2020-01-01

## 2020-01-30 NOTE — LETTER
1/30/2020        RE: Chadwick Aguilar  [address]  [facility] [address]        [facility]  [place] [facility] Medical Record Number:  [id number]  Place of Service where encounter took place:  [facility] [[id number]]  Chief Complaint   Patient presents with     RECHECK       HPI:    Chadwick Aguilar  is a 74 year old (1945), who is being seen today for an episodic care visit.  HPI information obtained from: facility chart records, facility staff, patient report and [facility] chart review.    Nsg reports ongoing aggressive behaviors such as hair pulling, refusal of cares and lowering himself to the floor. He is difficult to redirect at times.    Patient needing a f2f today for prn haldol.     Past Medical and Surgical History reviewed in Epic today.    MEDICATIONS:  Current Outpatient Medications   Medication Sig Dispense Refill     acetaminophen (TYLENOL) 120 MG suppository Place 15 mg/kg rectally every 4 hours as needed for fever       atropine 1 % SOLN Place 2 drops under the tongue every 2 hours as needed for secretions       bisacodyl (DULCOLAX) 10 MG suppository Place 10 mg rectally daily as needed for constipation       citalopram (CELEXA) 20 MG tablet Take 1 tablet (20 mg) by mouth daily 90 tablet 3     guaiFENesin (MUCINEX) 600 MG 12 hr tablet Take 1,200 mg by mouth 2 times daily       haloperidol (HALDOL) 2 MG/ML (HIGH CONC) solution Take 1 mg by mouth 2 times daily Also take 2 mg by mouth 1 time daily and also take 1 mg by mouth every 6 hours as needed       ipratropium - albuterol 0.5 mg/2.5 mg/3 mL (DUONEB) 0.5-2.5 (3) MG/3ML neb solution Take 1 vial by nebulization every 4 hours as needed for shortness of breath / dyspnea or wheezing       lisinopril (PRINIVIL/ZESTRIL) 40 MG tablet Take 1 tablet (40 mg) by mouth daily 90 tablet 3     morphine sulfate HIGH CONCENTRATE (ROXANOL) 20 mg/mL (HIGH CONC) solution Take 2.5 mg by mouth 3 times daily And also  "take 2.5 mg by mouth every 2 hours as needed       order for DME Equipment being ordered: Portable oxygen concentrator with O2 @ 2L NC PRN shortnes of breath. 1 each 0     order for DME Equipment being ordered: shower chair 1 each 0     senna (SENOKOT) 8.6 MG tablet Take 1 tablet by mouth daily And also 1 tablet by mouth as needed       TRAZODONE HCL PO Take 25 mg by mouth daily       aspirin 81 MG chewable tablet Take 1 tablet (81 mg) by mouth daily 108 tablet 3     nitroGLYcerin (NITROSTAT) 0.4 MG sublingual tablet For chest pain place 1 tablet under the tongue every 5 minutes for 3 doses. If symptoms persist 5 minutes after 1st dose call 911. 25 tablet 0         REVIEW OF SYSTEMS:  Unobtainable secondary to cognitive impairment.     Objective:  BP (!) 156/75   Pulse 80   Temp 98.4  F (36.9  C)   Resp 18   Ht 1.676 m (5' 6\")   Wt 60.1 kg (132 lb 9.6 oz)   SpO2 93%   BMI 21.40 kg/m     Exam:  GENERAL APPEARANCE:  in no distress, asleep, lying in bed    Labs:   not follwoing labs, pt is on hospice    ASSESSMENT/PLAN:     Chronic respiratory failure with hypoxia (H)  Late onset Alzheimer's disease without behavioral disturbance (H)  Hospice care patient   - discussed use of haldol with hospice RN and facility staff.   - update NP if increased behavioral concerns.     transcribed by : Chary Geller  Orders:  1. Ok to continue PRN Haldol. End date 2/13/2020. Pt will need f2f visit if PRN needing renewal in 14 days      Electronically signed by:  BERNADINE Mcdonald CNP             Sincerely,        BERNADINE Mcdonald CNP    "

## 2020-01-30 NOTE — PROGRESS NOTES
[facility]  [place] Medical Record Number:  [id number]  Place of Service where encounter took place:  [facility] [[id number]]  Chief Complaint   Patient presents with     RECHECK       HPI:    Chadwick Aguilar  is a 74 year old (1945), who is being seen today for an episodic care visit.  HPI information obtained from: facility chart records, facility staff, patient report and [facility] chart review.    Nsg reports ongoing aggressive behaviors such as hair pulling, refusal of cares and lowering himself to the floor. He is difficult to redirect at times.    Patient needing a f2f today for prn haldol.     Past Medical and Surgical History reviewed in Epic today.    MEDICATIONS:  Current Outpatient Medications   Medication Sig Dispense Refill     acetaminophen (TYLENOL) 120 MG suppository Place 15 mg/kg rectally every 4 hours as needed for fever       atropine 1 % SOLN Place 2 drops under the tongue every 2 hours as needed for secretions       bisacodyl (DULCOLAX) 10 MG suppository Place 10 mg rectally daily as needed for constipation       citalopram (CELEXA) 20 MG tablet Take 1 tablet (20 mg) by mouth daily 90 tablet 3     guaiFENesin (MUCINEX) 600 MG 12 hr tablet Take 1,200 mg by mouth 2 times daily       haloperidol (HALDOL) 2 MG/ML (HIGH CONC) solution Take 1 mg by mouth 2 times daily Also take 2 mg by mouth 1 time daily and also take 1 mg by mouth every 6 hours as needed       ipratropium - albuterol 0.5 mg/2.5 mg/3 mL (DUONEB) 0.5-2.5 (3) MG/3ML neb solution Take 1 vial by nebulization every 4 hours as needed for shortness of breath / dyspnea or wheezing       lisinopril (PRINIVIL/ZESTRIL) 40 MG tablet Take 1 tablet (40 mg) by mouth daily 90 tablet 3     morphine sulfate HIGH CONCENTRATE (ROXANOL) 20 mg/mL (HIGH CONC) solution Take 2.5 mg by mouth 3 times daily And also take 2.5 mg by mouth every 2 hours as needed       order for DME Equipment being ordered:  "Portable oxygen concentrator with O2 @ 2L NC PRN shortnes of breath. 1 each 0     order for DME Equipment being ordered: shower chair 1 each 0     senna (SENOKOT) 8.6 MG tablet Take 1 tablet by mouth daily And also 1 tablet by mouth as needed       TRAZODONE HCL PO Take 25 mg by mouth daily       aspirin 81 MG chewable tablet Take 1 tablet (81 mg) by mouth daily 108 tablet 3     nitroGLYcerin (NITROSTAT) 0.4 MG sublingual tablet For chest pain place 1 tablet under the tongue every 5 minutes for 3 doses. If symptoms persist 5 minutes after 1st dose call 911. 25 tablet 0         REVIEW OF SYSTEMS:  Unobtainable secondary to cognitive impairment.     Objective:  BP (!) 156/75   Pulse 80   Temp 98.4  F (36.9  C)   Resp 18   Ht 1.676 m (5' 6\")   Wt 60.1 kg (132 lb 9.6 oz)   SpO2 93%   BMI 21.40 kg/m    Exam:  GENERAL APPEARANCE:  in no distress, asleep, lying in bed    Labs:   not follwoing labs, pt is on hospice    ASSESSMENT/PLAN:     Chronic respiratory failure with hypoxia (H)  Late onset Alzheimer's disease without behavioral disturbance (H)  Hospice care patient   - discussed use of haldol with hospice RN and facility staff.   - update NP if increased behavioral concerns.     transcribed by : Chary Geller  Orders:  1. Ok to continue PRN Haldol. End date 2/13/2020. Pt will need f2f visit if PRN needing renewal in 14 days      Electronically signed by:  BERNADINE Mcdonald CNP         "

## 2020-02-07 NOTE — PROGRESS NOTES
[facility]  PRIMARY CARE PROVIDER AND CLINIC:  Jessi Hills MD, [address] [facility] [address]  Chief Complaint   Patient presents with     Establish Care     [facility] Medical Record Number:  [id number]  Place of Service where encounter took place:  [facility] [[id number]]    Chadwick Aguilar  is a 74 year old  (1945), admitted to the above facility from home. for  rehab, medical management, nursing care and hospice.    HPI:    HPI information obtained from: facility staff, patient report and [facility] chart review.   Brief Summary:  -  Resident with PMH notable for COPDE, chronic respiratory failure, PVD s/p iliac and femoral stenting, HTN/Hyperlipidemia, dementia, on hospice at home, transferred to LTC of this facility for increased care needs.     Today,   - resident seen and examined today. Reports doing fine.   - reports slight SOB which is at baseline, denies CP, SOB, wheezing, cough  - reports use 4WW for ambulation.   - denies pain.   - reports appetites, sleep and BM are fine.   - GNP and RN have no concern.     CODE STATUS/ADVANCE DIRECTIVES DISCUSSION:   DNR only  Patient's living condition: lives alone  ALLERGIES: Patient has no known allergies.  PAST MEDICAL HISTORY:  has a past medical history of COPD (chronic obstructive pulmonary disease) (H) and Peripheral vascular disease (H).  PAST SURGICAL HISTORY:   has a past surgical history that includes tonsillectomy & adenoidectomy; colonoscopy (2012); REVISE THUMB TENDON (Right); BALLN ANGIOPLASTY OPEN,ILIAC (2016); and Percutaneous Coronary Intervention Angioplasty (12/2016).  FAMILY HISTORY: reviewed and non contributory  SOCIAL HISTORY:   reports that he has been smoking cigarettes. He has smoked for the past 50.00 years. He has never used smokeless tobacco. He reports current alcohol use. He reports that he does not use drugs.    Post Discharge Medication Reconciliation Status:  "discharge medications reconciled and changed, per note/orders (see AVS)  Current Outpatient Medications   Medication Sig Dispense Refill     acetaminophen (TYLENOL) 120 MG suppository Place 15 mg/kg rectally every 4 hours as needed for fever       atropine 1 % SOLN Place 2 drops under the tongue every 2 hours as needed for secretions       bisacodyl (DULCOLAX) 10 MG suppository Place 10 mg rectally daily as needed for constipation       citalopram (CELEXA) 20 MG tablet Take 1 tablet (20 mg) by mouth daily 90 tablet 3     guaiFENesin (MUCINEX) 600 MG 12 hr tablet Take 1,200 mg by mouth 2 times daily       haloperidol (HALDOL) 2 MG/ML (HIGH CONC) solution Take 1 mg by mouth 2 times daily Also take 2 mg by mouth 1 time daily and also take 1 mg by mouth every 6 hours as needed       ipratropium - albuterol 0.5 mg/2.5 mg/3 mL (DUONEB) 0.5-2.5 (3) MG/3ML neb solution Take 1 vial by nebulization every 4 hours as needed for shortness of breath / dyspnea or wheezing       lisinopril (PRINIVIL/ZESTRIL) 40 MG tablet Take 1 tablet (40 mg) by mouth daily 90 tablet 3     morphine sulfate HIGH CONCENTRATE (ROXANOL) 20 mg/mL (HIGH CONC) solution Take 2.5 mg by mouth 3 times daily And also take 2.5 mg by mouth every 2 hours as needed       order for DME Equipment being ordered: Portable oxygen concentrator with O2 @ 2L NC PRN shortnes of breath. 1 each 0     order for DME Equipment being ordered: shower chair 1 each 0     senna (SENOKOT) 8.6 MG tablet Take 1 tablet by mouth daily And also 1 tablet by mouth as needed       TRAZODONE HCL PO Take 25 mg by mouth daily       divalproex sodium delayed-release (DEPAKOTE) 125 MG DR tablet Take 125 mg by mouth 2 times daily       ROS: .limited due to some cognitive concern.    Vitals:  /75   Pulse 78   Temp 97.4  F (36.3  C)   Resp 20   Ht 1.676 m (5' 6\")   Wt 58.8 kg (129 lb 11.2 oz)   SpO2 90%   BMI 20.93 kg/m    Exam:  GENERAL APPEARANCE:  in no distress, cooperative  ENT:  " Mouth and posterior oropharynx normal, moist mucous membranes, oral mucosa moist, no lesion noted.   EYES:  EOMI, Pupil rounded and equal.  RESP:  Diminished BS at the bases, no adding sounds.   CV:  S1S2 audible, regular HR, no murmur appreciated.   ABDOMEN:  soft, NT/ND, BS audible. no mass appreciated on palpation.   M/S:   no joint deformity noted on observation. 1+ pitting edema over feel (lateral surface)  SKIN:  No rash.   NEURO:   No NFD appreciated on observation. Hand  5/5 b/l  PSYCH:  AAO x Person and place only. Fair  insight, judgement and memory, affect and mood normal    Lab/Diagnostic data: no new data to review.     ASSESSMENT/PLAN:  ------------------------------------  COPD (chronic obstructive pulmonary disease) (H)  Hospice care patient  - Appreciate collaboration with  hospice team for symptom management in collaboration with cares for maximum comfort at end-of-life    Essential hypertension, benign  Chronic atrial fibrillation  Hyperlipidemia  PVD (peripheral vascular disease) (H): s/p stenting  - compensated.     Dementia without behavioral disturbance, unspecified dementia type (H)  - Continue to anticipate needs. Chronic condition, ongoing decline expected.   -  Continue to provide redirection and reassurance as needed. Maintain safe living situation with goals focused on comfort.    Order: See above, otherwise, continue the rest of the current POC.     Electronically signed by:  Adarsh Perea MD

## 2020-02-10 NOTE — LETTER
2/10/2020        RE: Chadwick Aguilar  [address]  [facility] [address]        [place] [facility]  PRIMARY CARE PROVIDER AND CLINIC:  Jessi Hills MD, [address] / [facility] [address]  Chief Complaint   Patient presents with     Establish Care     [place] Medical Record Number:  [id number]  Place of Service where encounter took place:  [facility] [[id number]]    Chadwick Aguilar  is a 74 year old  (1945), admitted to the above facility from home. for  rehab, medical management, nursing care and hospice.    HPI:    HPI information obtained from: facility staff, patient report and [facility] chart review.   Brief Summary:  -  Resident with PMH notable for COPDE, chronic respiratory failure, PVD s/p iliac and femoral stenting, HTN/Hyperlipidemia, dementia, on hospice at home, transferred to LTC of this facility for increased care needs.     Today,   - resident seen and examined today. Reports doing fine.   - reports slight SOB which is at baseline, denies CP, SOB, wheezing, cough  - reports use 4WW for ambulation.   - denies pain.   - reports appetites, sleep and BM are fine.   - GNP and RN have no concern.     CODE STATUS/ADVANCE DIRECTIVES DISCUSSION:   DNR only  Patient's living condition: lives alone  ALLERGIES: Patient has no known allergies.  PAST MEDICAL HISTORY:  has a past medical history of COPD (chronic obstructive pulmonary disease) (H) and Peripheral vascular disease (H).  PAST SURGICAL HISTORY:   has a past surgical history that includes tonsillectomy & adenoidectomy; colonoscopy (2012); REVISE THUMB TENDON (Right); BALLN ANGIOPLASTY OPEN,ILIAC (2016); and Percutaneous Coronary Intervention Angioplasty (12/2016).  FAMILY HISTORY: reviewed and non contributory  SOCIAL HISTORY:   reports that he has been smoking cigarettes. He has smoked for the past 50.00 years. He has never used smokeless tobacco. He reports current alcohol use.  "He reports that he does not use drugs.    Post Discharge Medication Reconciliation Status: discharge medications reconciled and changed, per note/orders (see AVS)  Current Outpatient Medications   Medication Sig Dispense Refill     acetaminophen (TYLENOL) 120 MG suppository Place 15 mg/kg rectally every 4 hours as needed for fever       atropine 1 % SOLN Place 2 drops under the tongue every 2 hours as needed for secretions       bisacodyl (DULCOLAX) 10 MG suppository Place 10 mg rectally daily as needed for constipation       citalopram (CELEXA) 20 MG tablet Take 1 tablet (20 mg) by mouth daily 90 tablet 3     guaiFENesin (MUCINEX) 600 MG 12 hr tablet Take 1,200 mg by mouth 2 times daily       haloperidol (HALDOL) 2 MG/ML (HIGH CONC) solution Take 1 mg by mouth 2 times daily Also take 2 mg by mouth 1 time daily and also take 1 mg by mouth every 6 hours as needed       ipratropium - albuterol 0.5 mg/2.5 mg/3 mL (DUONEB) 0.5-2.5 (3) MG/3ML neb solution Take 1 vial by nebulization every 4 hours as needed for shortness of breath / dyspnea or wheezing       lisinopril (PRINIVIL/ZESTRIL) 40 MG tablet Take 1 tablet (40 mg) by mouth daily 90 tablet 3     morphine sulfate HIGH CONCENTRATE (ROXANOL) 20 mg/mL (HIGH CONC) solution Take 2.5 mg by mouth 3 times daily And also take 2.5 mg by mouth every 2 hours as needed       order for DME Equipment being ordered: Portable oxygen concentrator with O2 @ 2L NC PRN shortnes of breath. 1 each 0     order for DME Equipment being ordered: shower chair 1 each 0     senna (SENOKOT) 8.6 MG tablet Take 1 tablet by mouth daily And also 1 tablet by mouth as needed       TRAZODONE HCL PO Take 25 mg by mouth daily       divalproex sodium delayed-release (DEPAKOTE) 125 MG DR tablet Take 125 mg by mouth 2 times daily       ROS: .limited due to some cognitive concern.    Vitals:  /75   Pulse 78   Temp 97.4  F (36.3  C)   Resp 20   Ht 1.676 m (5' 6\")   Wt 58.8 kg (129 lb 11.2 oz)   " SpO2 90%   BMI 20.93 kg/m     Exam:  GENERAL APPEARANCE:  in no distress, cooperative  ENT:  Mouth and posterior oropharynx normal, moist mucous membranes, oral mucosa moist, no lesion noted.   EYES:  EOMI, Pupil rounded and equal.  RESP:  Diminished BS at the bases, no adding sounds.   CV:  S1S2 audible, regular HR, no murmur appreciated.   ABDOMEN:  soft, NT/ND, BS audible. no mass appreciated on palpation.   M/S:   no joint deformity noted on observation. 1+ pitting edema over feel (lateral surface)  SKIN:  No rash.   NEURO:   No NFD appreciated on observation. Hand  5/5 b/l  PSYCH:  AAO x Person and place only. Fair  insight, judgement and memory, affect and mood normal    Lab/Diagnostic data: no new data to review.     ASSESSMENT/PLAN:  ------------------------------------  COPD (chronic obstructive pulmonary disease) (H)  Hospice care patient  - Appreciate collaboration with  hospice team for symptom management in collaboration with cares for maximum comfort at end-of-life    Essential hypertension, benign  Chronic atrial fibrillation  Hyperlipidemia  PVD (peripheral vascular disease) (H): s/p stenting  - compensated.     Dementia without behavioral disturbance, unspecified dementia type (H)  - Continue to anticipate needs. Chronic condition, ongoing decline expected.   -  Continue to provide redirection and reassurance as needed. Maintain safe living situation with goals focused on comfort.    Order: See above, otherwise, continue the rest of the current POC.     Electronically signed by:  Adarsh Perea MD                       Sincerely,        Adarsh Perea MD

## 2020-02-13 NOTE — PROGRESS NOTES
[place] [facility]  [place] Medical Record Number:  [id number]  Place of Service where encounter took place:  [facility] ([facility] [[id number]]  Chief Complaint   Patient presents with     RECHECK       HPI:    Chadwick Aguilar  is a 74 year old (1945), who is being seen today for an episodic care visit.  HPI information obtained from: facility chart records, facility staff, patient report and [facility] chart review.     Seeing patient for follow-up visit today.  Per facility staff and hospice RN patient continues to have aggressive behaviors that include pulling staff's care, grabbing at staff, and inappropriate sexual behaviors.  Staff are utilizing PRN Haldol, this is been intermittently effective for extreme agitation.    Past Medical and Surgical History reviewed in Epic today.    MEDICATIONS:  Current Outpatient Medications   Medication Sig Dispense Refill     acetaminophen (TYLENOL) 120 MG suppository Place 15 mg/kg rectally every 4 hours as needed for fever       atropine 1 % SOLN Place 2 drops under the tongue every 2 hours as needed for secretions       bisacodyl (DULCOLAX) 10 MG suppository Place 10 mg rectally daily as needed for constipation       citalopram (CELEXA) 20 MG tablet Take 1 tablet (20 mg) by mouth daily 90 tablet 3     divalproex sodium delayed-release (DEPAKOTE) 125 MG DR tablet Take 125 mg by mouth 2 times daily       guaiFENesin (MUCINEX) 600 MG 12 hr tablet Take 1,200 mg by mouth 2 times daily       haloperidol (HALDOL) 2 MG/ML (HIGH CONC) solution Take 1 mg by mouth 2 times daily Also take 2 mg by mouth 1 time daily and also take 1 mg by mouth every 6 hours as needed       ipratropium - albuterol 0.5 mg/2.5 mg/3 mL (DUONEB) 0.5-2.5 (3) MG/3ML neb solution Take 1 vial by nebulization every 4 hours as needed for shortness of breath / dyspnea or wheezing       lisinopril (PRINIVIL/ZESTRIL) 40 MG tablet Take 1 tablet (40 mg) by mouth daily 90 tablet 3      "morphine sulfate HIGH CONCENTRATE (ROXANOL) 20 mg/mL (HIGH CONC) solution Take 2.5 mg by mouth 3 times daily And also take 2.5 mg by mouth every 2 hours as needed       order for DME Equipment being ordered: Portable oxygen concentrator with O2 @ 2L NC PRN shortnes of breath. 1 each 0     order for DME Equipment being ordered: shower chair 1 each 0     senna (SENOKOT) 8.6 MG tablet Take 1 tablet by mouth daily And also 1 tablet by mouth as needed       TRAZODONE HCL PO Take 25 mg by mouth daily         REVIEW OF SYSTEMS:  Limited secondary to cognitive impairment but today pt reports no pain    Objective:  /75   Pulse 78   Temp 97.4  F (36.3  C)   Resp 20   Ht 1.676 m (5' 6\")   Wt 58.8 kg (129 lb 11.2 oz)   SpO2 96%   BMI 20.93 kg/m    Exam:  GENERAL APPEARANCE:  Alert, in no distress  ABDOMEN:  normal bowel sounds, soft, nontender, no hepatosplenomegaly or other masses  SKIN:  Inspection of skin and subcutaneous tissue baseline  PSYCH:  memory impaired , thought content , mood at baseline    Labs:   not following routine labs, pt is on hospice    ASSESSMENT/PLAN:     Late onset Alzheimer's disease with behavioral disturbance (H)  Inappropriate sexual behavior  Aggressive behavior  Hospice care patient   -Continue scheduled Haldol,celexa and trazodone. Continue prn haldol as this is effective for agitation and psychosis, will need f2f in 14 days.   - Will add Depakote to help with impulsive behavior such as hair pulling and grabbing staff and sexual inappropriate comments  -Staff to update if ongoing concerns or if behaviors do not improve    transcribed by : Chary Geller  Orders:  1. F2F today for PM haldol. End date 2/27/2020. Update NP if med to be re-newed in 14 days  2. Depakote 125 mg BID - dx: sexual behaviors    Electronically signed by:  BERNADINE Mcdonald CNP         "

## 2020-02-13 NOTE — LETTER
2/13/2020        RE: Chadwick Aguilar  [address] [facility] [address]        [place] [facility]  [place] Medical Record Number:  [id number]  [facility] of Service where encounter took place[facility] [[id number]]  Chief Complaint   Patient presents with     RECHECK       HPI:    Chadwick Aguilar  is a 74 year old (1945), who is being seen today for an episodic care visit.  HPI information obtained from: facility chart records, facility staff, patient report and [facility] chart review.     Seeing patient for follow-up visit today.  Per facility staff and hospice RN patient continues to have aggressive behaviors that include pulling staff's care, grabbing at staff, and inappropriate sexual behaviors.  Staff are utilizing PRN Haldol, this is been intermittently effective for extreme agitation.    Past Medical and Surgical History reviewed in Epic today.    MEDICATIONS:  Current Outpatient Medications   Medication Sig Dispense Refill     acetaminophen (TYLENOL) 120 MG suppository Place 15 mg/kg rectally every 4 hours as needed for fever       atropine 1 % SOLN Place 2 drops under the tongue every 2 hours as needed for secretions       bisacodyl (DULCOLAX) 10 MG suppository Place 10 mg rectally daily as needed for constipation       citalopram (CELEXA) 20 MG tablet Take 1 tablet (20 mg) by mouth daily 90 tablet 3     divalproex sodium delayed-release (DEPAKOTE) 125 MG DR tablet Take 125 mg by mouth 2 times daily       guaiFENesin (MUCINEX) 600 MG 12 hr tablet Take 1,200 mg by mouth 2 times daily       haloperidol (HALDOL) 2 MG/ML (HIGH CONC) solution Take 1 mg by mouth 2 times daily Also take 2 mg by mouth 1 time daily and also take 1 mg by mouth every 6 hours as needed       ipratropium - albuterol 0.5 mg/2.5 mg/3 mL (DUONEB) 0.5-2.5 (3) MG/3ML neb solution Take 1 vial by nebulization every 4 hours as needed for shortness of breath / dyspnea or wheezing        "lisinopril (PRINIVIL/ZESTRIL) 40 MG tablet Take 1 tablet (40 mg) by mouth daily 90 tablet 3     morphine sulfate HIGH CONCENTRATE (ROXANOL) 20 mg/mL (HIGH CONC) solution Take 2.5 mg by mouth 3 times daily And also take 2.5 mg by mouth every 2 hours as needed       order for DME Equipment being ordered: Portable oxygen concentrator with O2 @ 2L NC PRN shortnes of breath. 1 each 0     order for DME Equipment being ordered: shower chair 1 each 0     senna (SENOKOT) 8.6 MG tablet Take 1 tablet by mouth daily And also 1 tablet by mouth as needed       TRAZODONE HCL PO Take 25 mg by mouth daily         REVIEW OF SYSTEMS:  Limited secondary to cognitive impairment but today pt reports no pain    Objective:  /75   Pulse 78   Temp 97.4  F (36.3  C)   Resp 20   Ht 1.676 m (5' 6\")   Wt 58.8 kg (129 lb 11.2 oz)   SpO2 96%   BMI 20.93 kg/m     Exam:  GENERAL APPEARANCE:  Alert, in no distress  ABDOMEN:  normal bowel sounds, soft, nontender, no hepatosplenomegaly or other masses  SKIN:  Inspection of skin and subcutaneous tissue baseline  PSYCH:  memory impaired , thought content , mood at baseline    Labs:   not following routine labs, pt is on hospice    ASSESSMENT/PLAN:     Late onset Alzheimer's disease with behavioral disturbance (H)  Inappropriate sexual behavior  Aggressive behavior  Hospice care patient   -Continue scheduled Haldol,celexa and trazodone. Continue prn haldol as this is effective for agitation and psychosis, will need f2f in 14 days.   - Will add Depakote to help with impulsive behavior such as hair pulling and grabbing staff and sexual inappropriate comments  -Staff to update if ongoing concerns or if behaviors do not improve    transcribed by : Chary Geller  Orders:  1. F2F today for PM haldol. End date 2/27/2020. Update NP if med to be re-newed in 14 days  2. Depakote 125 mg BID - dx: sexual behaviors    Electronically signed by:  BERNADINE Mcdonald CNP "             Sincerely,        BERNADINE Mcdonald CNP

## 2020-03-10 NOTE — PROGRESS NOTES
[place] [facility]  Chief Complaint   Patient presents with     half-way Regulatory     [place] Medical Record Number:  [id number]  Place of Service where encounter took place[facility] ([facility] [[id number]]    HPI:    Chadwick Aguilar  is 74 year old (1945), who is being seen today for a federally mandated E/M visit.  HPI information obtained from: facility chart records, facility staff, patient report and [facility] chart review.     Seeing patient today for a reg visit.   No nsg or hospice concerns reported.   Met with patient in his room today. He appears comfortable and in no distress. Breathing non labored.     ALLERGIES:Patient has no known allergies.  PAST MEDICAL HISTORY:   has a past medical history of COPD (chronic obstructive pulmonary disease) (H) and Peripheral vascular disease (H).  PAST SURGICAL HISTORY:   has a past surgical history that includes tonsillectomy & adenoidectomy; colonoscopy (2012); REVISE THUMB TENDON (Right); BALLN ANGIOPLASTY OPEN,ILIAC (2016); and Percutaneous Coronary Intervention Angioplasty (12/2016).  FAMILY HISTORY: family history includes No Known Problems in his father and mother.  SOCIAL HISTORY:  reports that he has been smoking cigarettes. He has smoked for the past 50.00 years. He has never used smokeless tobacco. He reports current alcohol use. He reports that he does not use drugs.    MEDICATIONS:  Current Outpatient Medications   Medication Sig Dispense Refill     acetaminophen (TYLENOL) 120 MG suppository Place 15 mg/kg rectally every 4 hours as needed for fever       atropine 1 % SOLN Place 2 drops under the tongue every 2 hours as needed for secretions       bisacodyl (DULCOLAX) 10 MG suppository Place 10 mg rectally daily as needed for constipation       citalopram (CELEXA) 20 MG tablet Take 1 tablet (20 mg) by mouth daily 90 tablet 3     divalproex sodium delayed-release (DEPAKOTE) 125 MG DR tablet Take 125 mg by mouth 2  times daily       guaiFENesin (MUCINEX) 600 MG 12 hr tablet Take 600 mg by mouth 2 times daily        haloperidol (HALDOL) 2 MG/ML (HIGH CONC) solution Take 1 mg by mouth 2 times daily Also take 2 mg by mouth 1 time daily and also take 1 mg by mouth every 6 hours as needed       ipratropium - albuterol 0.5 mg/2.5 mg/3 mL (DUONEB) 0.5-2.5 (3) MG/3ML neb solution Take 1 vial by nebulization every 4 hours as needed for shortness of breath / dyspnea or wheezing       lisinopril (PRINIVIL/ZESTRIL) 40 MG tablet Take 1 tablet (40 mg) by mouth daily 90 tablet 3     magnesium hydroxide (MILK OF MAGNESIA) 400 MG/5ML suspension Take 30 mLs by mouth daily as needed for constipation or heartburn       morphine sulfate HIGH CONCENTRATE (ROXANOL) 20 mg/mL (HIGH CONC) solution Take 2.5 mg by mouth 3 times daily And also take 2.5 mg by mouth every 2 hours as needed       senna (SENOKOT) 8.6 MG tablet Take 1 tablet by mouth daily And also 1 tablet by mouth as needed       TRAZODONE HCL PO Take 25 mg by mouth daily       order for DME Equipment being ordered: Portable oxygen concentrator with O2 @ 2L NC PRN shortnes of breath. 1 each 0     order for DME Equipment being ordered: shower chair 1 each 0       Case Management:  I have reviewed the care plan and MDS and do agree with the plan. Patient's desire to return to the community is not assessible due to cognitive impairment. Information reviewed:  Medications, vital signs, orders, and nursing notes.    ROS:  Unobtainable secondary to cognitive impairment.     Vitals:  /67   Pulse 97   Temp 98.1  F (36.7  C)   Resp 16   Wt 57 kg (125 lb 11.2 oz)   SpO2 91%   BMI 20.29 kg/m    Body mass index is 20.29 kg/m .  Exam:  GENERAL APPEARANCE:  in no distress, somnolent  RESP:  no respiratory distress, diminished breath sounds t/o  CV:  irregular rhythm , reg rate, no edema  ABDOMEN:  no guarding or rebound, bowel sounds normal  SKIN:  Inspection of skin and subcutaneous tissue  baseline  PSYCH:  insight and judgement impaired, memory impaired , flat affect    Lab/Diagnostic data:   not following routine labs, pt on hospice    ASSESSMENT/PLAN  Late onset Alzheimer's disease with behavioral disturbance (H)  Aggressive behavior  Inappropriate sexual behavior  Hospice care patient- copd- [place]  - behaviors have improved, continue depakote, haldol, trazadon and celexa  - needs assistance with all ADL's, staff to ancipate patient needs    Chronic respiratory failure with hypoxia (H)  Chronic obstructive pulmonary disease, unspecified COPD type (H)  - breathing stable, continue prn oxygen and nebs    Paroxysmal atrial fibrillation (H)  Htn  - -125, will decrease lisinopril from 40 to 30, nsg to update is SBP >160  - No longer on AOC  - no longer following routine labs, goal is comfort      transcribed by : Juancho Spence  1. Decrease Lisinopril to 30 mg every day, Update NP if SBP > 160.       Electronically signed by:  BERNADINE Mcdonald CNP

## 2020-03-10 NOTE — LETTER
3/10/2020        RE: Chadwick Aguilar  [address] [facility] [address]        [place] [facility]  Chief Complaint   Patient presents with     retirement Regulatory     [place] Medical Record Number:  [id number]  Place of Service where encounter took place:  [facility] ([facility] [[id number]]    HPI:    Chadwick Aguilar  is 74 year old (1945), who is being seen today for a federally mandated E/M visit.  HPI information obtained from: facility chart records, facility staff, patient report and [facility] chart review.     Seeing patient today for a reg visit.   No nsg or hospice concerns reported.   Met with patient in his room today. He appears comfortable and in no distress. Breathing non labored.     ALLERGIES:Patient has no known allergies.  PAST MEDICAL HISTORY:   has a past medical history of COPD (chronic obstructive pulmonary disease) (H) and Peripheral vascular disease (H).  PAST SURGICAL HISTORY:   has a past surgical history that includes tonsillectomy & adenoidectomy; colonoscopy (2012); REVISE THUMB TENDON (Right); BALLN ANGIOPLASTY OPEN,ILIAC (2016); and Percutaneous Coronary Intervention Angioplasty (12/2016).  FAMILY HISTORY: family history includes No Known Problems in his father and mother.  SOCIAL HISTORY:  reports that he has been smoking cigarettes. He has smoked for the past 50.00 years. He has never used smokeless tobacco. He reports current alcohol use. He reports that he does not use drugs.    MEDICATIONS:  Current Outpatient Medications   Medication Sig Dispense Refill     acetaminophen (TYLENOL) 120 MG suppository Place 15 mg/kg rectally every 4 hours as needed for fever       atropine 1 % SOLN Place 2 drops under the tongue every 2 hours as needed for secretions       bisacodyl (DULCOLAX) 10 MG suppository Place 10 mg rectally daily as needed for constipation       citalopram (CELEXA) 20 MG tablet Take 1 tablet (20 mg) by mouth daily 90 tablet  3     divalproex sodium delayed-release (DEPAKOTE) 125 MG DR tablet Take 125 mg by mouth 2 times daily       guaiFENesin (MUCINEX) 600 MG 12 hr tablet Take 600 mg by mouth 2 times daily        haloperidol (HALDOL) 2 MG/ML (HIGH CONC) solution Take 1 mg by mouth 2 times daily Also take 2 mg by mouth 1 time daily and also take 1 mg by mouth every 6 hours as needed       ipratropium - albuterol 0.5 mg/2.5 mg/3 mL (DUONEB) 0.5-2.5 (3) MG/3ML neb solution Take 1 vial by nebulization every 4 hours as needed for shortness of breath / dyspnea or wheezing       lisinopril (PRINIVIL/ZESTRIL) 40 MG tablet Take 1 tablet (40 mg) by mouth daily 90 tablet 3     magnesium hydroxide (MILK OF MAGNESIA) 400 MG/5ML suspension Take 30 mLs by mouth daily as needed for constipation or heartburn       morphine sulfate HIGH CONCENTRATE (ROXANOL) 20 mg/mL (HIGH CONC) solution Take 2.5 mg by mouth 3 times daily And also take 2.5 mg by mouth every 2 hours as needed       senna (SENOKOT) 8.6 MG tablet Take 1 tablet by mouth daily And also 1 tablet by mouth as needed       TRAZODONE HCL PO Take 25 mg by mouth daily       order for DME Equipment being ordered: Portable oxygen concentrator with O2 @ 2L NC PRN shortnes of breath. 1 each 0     order for DME Equipment being ordered: shower chair 1 each 0       Case Management:  I have reviewed the care plan and MDS and do agree with the plan. Patient's desire to return to the community is not assessible due to cognitive impairment. Information reviewed:  Medications, vital signs, orders, and nursing notes.    ROS:  Unobtainable secondary to cognitive impairment.     Vitals:  /67   Pulse 97   Temp 98.1  F (36.7  C)   Resp 16   Wt 57 kg (125 lb 11.2 oz)   SpO2 91%   BMI 20.29 kg/m    Body mass index is 20.29 kg/m .  Exam:  GENERAL APPEARANCE:  in no distress, somnolent  RESP:  no respiratory distress, diminished breath sounds t/o  CV:  irregular rhythm , reg rate, no edema  ABDOMEN:  no  guarding or rebound, bowel sounds normal  SKIN:  Inspection of skin and subcutaneous tissue baseline  PSYCH:  insight and judgement impaired, memory impaired , flat affect    Lab/Diagnostic data:   not following routine labs, pt on hospice    ASSESSMENT/PLAN  Late onset Alzheimer's disease with behavioral disturbance (H)  Aggressive behavior  Inappropriate sexual behavior  Hospice care patient- copd- [place]  - behaviors have improved, continue depakote, haldol, trazadon and celexa  - needs assistance with all ADL's, staff to ancipate patient needs    Chronic respiratory failure with hypoxia (H)  Chronic obstructive pulmonary disease, unspecified COPD type (H)  - breathing stable, continue prn oxygen and nebs    Paroxysmal atrial fibrillation (H)  Htn  - -125, will decrease lisinopril from 40 to 30, nsg to update is SBP >160  - No longer on AOC  - no longer following routine labs, goal is comfort      transcribed by : Juancho Spence  1. Decrease Lisinopril to 30 mg every day, Update NP if SBP > 160.       Electronically signed by:  BERNADINE Mcdonald CNP              Sincerely,        BERNADINE Mcdonald CNP

## 2020-04-10 NOTE — PROGRESS NOTES
"[place] [facility]   Chadwick Aguilar is being evaluated via a billable video visit due to the restrictions of the Covid-19 pandemic.   The patient has been notified of following:  \"This video visit will be conducted via a call between you and your provider. We have found that certain health care needs can be provided without the need for an in-person physical exam.  This service lets us provide the care you need with a video conversation. If during the course of the call the provider feels a video visit is not appropriate, you will not be charged for this service.\"   The provider has received verbal consent for a Video Visit from the patient or first contact? Yes  Patient or facility staff would like the video invitation sent by: N/A   Video Start Time: 13:30  [place] Medical Record Number:  [id number]  Place of Location at the time of visit: [facility]  Chief Complaint   Patient presents with     Video Visit     Nursing Home Regulatory     HPI: HPI information obtained from: facility staff, patient report and [facility] chart review.     Today's concern is:  -  Rn reports Resident at times gets agitated, walks with assistance. Otherwise at baseline.   ----------------------------------------------------------------------------  - Past Medical, social, family histories, medications, and allergies reviewed and updated  - Medications reviewed: in the chart and EHR.   - Case Management:   I have reviewed the care plan and MDS and do agree with the plan. Patient's desire to return to the community is not present.  Information reviewed:  Medications, vital signs, orders, and nursing notes.    MEDICATIONS:  Current Outpatient Medications   Medication Sig Dispense Refill     acetaminophen (TYLENOL) 120 MG suppository Place 15 mg/kg rectally every 4 hours as needed for fever       atropine 1 % SOLN Place 2 drops under the tongue every 2 hours as needed for secretions       bisacodyl " "(DULCOLAX) 10 MG suppository Place 10 mg rectally daily as needed for constipation       citalopram (CELEXA) 20 MG tablet Take 1 tablet (20 mg) by mouth daily 90 tablet 3     divalproex sodium delayed-release (DEPAKOTE) 125 MG DR tablet Take 125 mg by mouth 2 times daily       guaiFENesin (MUCINEX) 600 MG 12 hr tablet Take 600 mg by mouth 2 times daily        haloperidol (HALDOL) 2 MG/ML (HIGH CONC) solution Take 1 mg by mouth 2 times daily Also take 2 mg by mouth 1 time daily and also take 1 mg by mouth every 6 hours as needed       ipratropium - albuterol 0.5 mg/2.5 mg/3 mL (DUONEB) 0.5-2.5 (3) MG/3ML neb solution Take 1 vial by nebulization every 4 hours as needed for shortness of breath / dyspnea or wheezing       lisinopril (PRINIVIL/ZESTRIL) 40 MG tablet Take 1 tablet (40 mg) by mouth daily 90 tablet 3     magnesium hydroxide (MILK OF MAGNESIA) 400 MG/5ML suspension Take 30 mLs by mouth daily as needed for constipation or heartburn       morphine sulfate HIGH CONCENTRATE (ROXANOL) 20 mg/mL (HIGH CONC) solution Take 2.5 mg by mouth 3 times daily And also take 2.5 mg by mouth every 2 hours as needed       order for DME Equipment being ordered: Portable oxygen concentrator with O2 @ 2L NC PRN shortnes of breath. 1 each 0     order for DME Equipment being ordered: shower chair 1 each 0     senna (SENOKOT) 8.6 MG tablet Take 1 tablet by mouth daily And also 1 tablet by mouth as needed       TRAZODONE HCL PO Take 25 mg by mouth daily       REVIEW OF SYSTEMS: unobtainable 2/2 cognitive impairment    Objective: /52   Pulse 78   Temp 99  F (37.2  C)   Resp 18   Ht 1.676 m (5' 6\")   Wt 57.4 kg (126 lb 9.6 oz)   SpO2 (!) 88%   BMI 20.43 kg/m    Limited visit exam done given COVID-19 precautions.   GENERAL APPEARANCE:  in no distress.  RESP:  unlabored breathing.   M/S:   no joint deformity noted on observation.   SKIN:  No rash noted  NEURO:   No NFD appreciated on observation.   PSYCH:  poor  insight, " judgement and memory    Labs: Reviewed in the chart and EHR.      ASSESSMENT/PLAN:  ---------------------------  COPD (chronic obstructive pulmonary disease) (H)  Hospice care patient  - Appreciate collaboration with  hospice team for symptom management in collaboration with cares for maximum comfort at end-of-life    Essential hypertension, benign  Chronic atrial fibrillation  Hyperlipidemia  PVD (peripheral vascular disease) (H): s/p stenting  - compensated.     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 Chronic condition, ongoing decline expected.   -  Continue to provide redirection and reassurance as needed. Maintain safe living situation with goals focused on comfort.    Order: See above, otherwise, continue the rest of the current POC.     Electronically signed by:  Adarsh Perea MD     Video-Visit Details  Type of service:  Video Visit  Video End Time (time video stopped): 13:02  Distant Location (provider location):  [facility]

## 2020-04-13 NOTE — LETTER
"    4/13/2020        RE: Chadwick Aguilar  [id number] [facility] [address]        [place] [facility]   Chadwick Aguilar is being evaluated via a billable video visit due to the restrictions of the Covid-19 pandemic.   The patient has been notified of following:  \"This video visit will be conducted via a call between you and your provider. We have found that certain health care needs can be provided without the need for an in-person physical exam.  This service lets us provide the care you need with a video conversation. If during the course of the call the provider feels a video visit is not appropriate, you will not be charged for this service.\"   The provider has received verbal consent for a Video Visit from the patient or first contact? Yes  Patient or facility staff would like the video invitation sent by: N/A   Video Start Time: 13:30  [place] Medical Record Number:  [id number]  Place of Location at the time of visit: [facility]  Chief Complaint   Patient presents with     Video Visit     Nursing Home Regulatory     HPI: HPI information obtained from: facility staff, patient report and [facility] chart review.     Today's concern is:  -  Rn reports Resident at times gets agitated, walks with assistance. Otherwise at baseline.   ----------------------------------------------------------------------------  - Past Medical, social, family histories, medications, and allergies reviewed and updated  - Medications reviewed: in the chart and EHR.   - Case Management:   I have reviewed the care plan and MDS and do agree with the plan. Patient's desire to return to the community is not present.  Information reviewed:  Medications, vital signs, orders, and nursing notes.    MEDICATIONS:  Current Outpatient Medications   Medication Sig Dispense Refill     acetaminophen (TYLENOL) 120 MG suppository Place 15 mg/kg rectally every 4 hours as needed for fever       atropine 1 % " "SOLN Place 2 drops under the tongue every 2 hours as needed for secretions       bisacodyl (DULCOLAX) 10 MG suppository Place 10 mg rectally daily as needed for constipation       citalopram (CELEXA) 20 MG tablet Take 1 tablet (20 mg) by mouth daily 90 tablet 3     divalproex sodium delayed-release (DEPAKOTE) 125 MG DR tablet Take 125 mg by mouth 2 times daily       guaiFENesin (MUCINEX) 600 MG 12 hr tablet Take 600 mg by mouth 2 times daily        haloperidol (HALDOL) 2 MG/ML (HIGH CONC) solution Take 1 mg by mouth 2 times daily Also take 2 mg by mouth 1 time daily and also take 1 mg by mouth every 6 hours as needed       ipratropium - albuterol 0.5 mg/2.5 mg/3 mL (DUONEB) 0.5-2.5 (3) MG/3ML neb solution Take 1 vial by nebulization every 4 hours as needed for shortness of breath / dyspnea or wheezing       lisinopril (PRINIVIL/ZESTRIL) 40 MG tablet Take 1 tablet (40 mg) by mouth daily 90 tablet 3     magnesium hydroxide (MILK OF MAGNESIA) 400 MG/5ML suspension Take 30 mLs by mouth daily as needed for constipation or heartburn       morphine sulfate HIGH CONCENTRATE (ROXANOL) 20 mg/mL (HIGH CONC) solution Take 2.5 mg by mouth 3 times daily And also take 2.5 mg by mouth every 2 hours as needed       order for DME Equipment being ordered: Portable oxygen concentrator with O2 @ 2L NC PRN shortnes of breath. 1 each 0     order for DME Equipment being ordered: shower chair 1 each 0     senna (SENOKOT) 8.6 MG tablet Take 1 tablet by mouth daily And also 1 tablet by mouth as needed       TRAZODONE HCL PO Take 25 mg by mouth daily       REVIEW OF SYSTEMS: unobtainable 2/2 cognitive impairment    Objective: /52   Pulse 78   Temp 99  F (37.2  C)   Resp 18   Ht 1.676 m (5' 6\")   Wt 57.4 kg (126 lb 9.6 oz)   SpO2 (!) 88%   BMI 20.43 kg/m    Limited visit exam done given COVID-19 precautions.   GENERAL APPEARANCE:  in no distress.  RESP:  unlabored breathing.   M/S:   no joint deformity noted on observation.   SKIN: "  No rash noted  NEURO:   No NFD appreciated on observation.   PSYCH:  poor  insight, judgement and memory    Labs: Reviewed in the chart and EHR.      ASSESSMENT/PLAN:  ---------------------------  COPD (chronic obstructive pulmonary disease) (H)  Hospice care patient  - Appreciate collaboration with  hospice team for symptom management in collaboration with cares for maximum comfort at end-of-life    Essential hypertension, benign  Chronic atrial fibrillation  Hyperlipidemia  PVD (peripheral vascular disease) (H): s/p stenting  - compensated.     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 Chronic condition, ongoing decline expected.   -  Continue to provide redirection and reassurance as needed. Maintain safe living situation with goals focused on comfort.    Order: See above, otherwise, continue the rest of the current POC.     Electronically signed by:  Adarsh Perea MD     Video-Visit Details  Type of service:  Video Visit  Video End Time (time video stopped): 13:02  Distant Location (provider location):  [place] [facility]             Sincerely,        Adarsh Perea MD

## 2022-08-23 NOTE — PROGRESS NOTES
Please mail letter: This looks stable. Correa, please advise.
